# Patient Record
Sex: MALE | Race: WHITE | NOT HISPANIC OR LATINO | Employment: OTHER | URBAN - METROPOLITAN AREA
[De-identification: names, ages, dates, MRNs, and addresses within clinical notes are randomized per-mention and may not be internally consistent; named-entity substitution may affect disease eponyms.]

---

## 2017-08-22 ENCOUNTER — OFFICE VISIT (OUTPATIENT)
Dept: FAMILY MEDICINE | Facility: CLINIC | Age: 82
End: 2017-08-22
Payer: MEDICARE

## 2017-08-22 VITALS
WEIGHT: 154.31 LBS | BODY MASS INDEX: 22.85 KG/M2 | DIASTOLIC BLOOD PRESSURE: 96 MMHG | SYSTOLIC BLOOD PRESSURE: 120 MMHG | RESPIRATION RATE: 18 BRPM | HEART RATE: 72 BPM | HEIGHT: 69 IN

## 2017-08-22 DIAGNOSIS — I10 ESSENTIAL HYPERTENSION: ICD-10-CM

## 2017-08-22 DIAGNOSIS — R94.6 BORDERLINE ABNORMAL THYROID FUNCTION TEST: ICD-10-CM

## 2017-08-22 DIAGNOSIS — G20.A1 PARKINSON DISEASE: ICD-10-CM

## 2017-08-22 DIAGNOSIS — M79.601 RIGHT ARM PAIN: ICD-10-CM

## 2017-08-22 DIAGNOSIS — R29.898 WEAKNESS OF RIGHT HAND: Primary | ICD-10-CM

## 2017-08-22 DIAGNOSIS — Z95.2 S/P AVR (AORTIC VALVE REPLACEMENT): ICD-10-CM

## 2017-08-22 DIAGNOSIS — I48.91 ATRIAL FIBRILLATION, CONTROLLED: ICD-10-CM

## 2017-08-22 PROCEDURE — 99214 OFFICE O/P EST MOD 30 MIN: CPT | Mod: S$PBB | Performed by: FAMILY MEDICINE

## 2017-08-22 PROCEDURE — 99999 PR STA SHADOW: CPT | Mod: PBBFAC,,, | Performed by: FAMILY MEDICINE

## 2017-08-22 PROCEDURE — 99212 OFFICE O/P EST SF 10 MIN: CPT | Mod: PBBFAC | Performed by: FAMILY MEDICINE

## 2017-08-22 PROCEDURE — 99999 PR PBB SHADOW E&M-EST. PATIENT-LVL II: CPT | Mod: PBBFAC,,, | Performed by: FAMILY MEDICINE

## 2017-08-22 RX ORDER — LISINOPRIL 40 MG/1
40 TABLET ORAL DAILY
Status: ON HOLD | COMMUNITY
End: 2019-02-25 | Stop reason: HOSPADM

## 2017-08-22 RX ORDER — FUROSEMIDE 20 MG/1
20 TABLET ORAL DAILY
Status: ON HOLD | COMMUNITY
End: 2023-01-01

## 2017-08-22 RX ORDER — CARVEDILOL 12.5 MG/1
6.25 TABLET ORAL 2 TIMES DAILY WITH MEALS
COMMUNITY
End: 2023-01-01 | Stop reason: DRUGHIGH

## 2017-08-22 NOTE — PROGRESS NOTES
"Subjective:       Patient ID: Vipul Green is a 83 y.o. male.    Chief Complaint: Hand Pain (R hand )    Pt is a 83 y.o. male who presents for evaluation and management of   Encounter Diagnoses   Name Primary?    Weakness of right hand Yes    Right arm pain     Essential hypertension     S/P AVR (aortic valve replacement)     Atrial fibrillation, controlled     Parkinson disease    .I have not seen him in almost 2 years, but he is seeing Dr. Mra regularly   He is here today because of weakness and pain in right hand and forearm. "it feels like the nerve," he says.     Doing well on current meds. Denies any side effects. Prevention is up to date.  Review of Systems   Constitutional: Negative for activity change, appetite change, chills, diaphoresis, fatigue, fever and unexpected weight change.   HENT: Negative for congestion, dental problem, drooling, ear discharge, ear pain, facial swelling, mouth sores, nosebleeds, postnasal drip, rhinorrhea, sinus pressure, sneezing, sore throat, trouble swallowing and voice change.    Eyes: Negative for photophobia, pain, discharge, redness, itching and visual disturbance.   Respiratory: Negative for apnea, cough, chest tightness, shortness of breath and wheezing.    Cardiovascular: Negative for chest pain, palpitations and leg swelling.   Gastrointestinal: Negative for abdominal distention, abdominal pain, blood in stool, constipation, diarrhea, nausea and vomiting.   Genitourinary: Negative for difficulty urinating, dysuria, enuresis, flank pain, frequency, hematuria and urgency.   Musculoskeletal: Positive for gait problem. Negative for arthralgias, back pain, joint swelling, myalgias, neck pain and neck stiffness.        Ambulates with walker, left AFO for foot drop related to old CVA   Skin: Negative for color change, rash and wound.   Neurological: Positive for tremors and weakness. Negative for dizziness, seizures, syncope, facial asymmetry, speech difficulty, " light-headedness, numbness and headaches.   Hematological: Negative for adenopathy. Does not bruise/bleed easily.   Psychiatric/Behavioral: Negative for agitation, behavioral problems, confusion, decreased concentration, dysphoric mood, sleep disturbance and suicidal ideas. The patient is not nervous/anxious.        Objective:      Physical Exam   Constitutional: He is oriented to person, place, and time. He appears well-developed and well-nourished.   HENT:   Head: Normocephalic and atraumatic.   Right Ear: External ear normal.   Left Ear: External ear normal.   Nose: Nose normal.   Mouth/Throat: Oropharynx is clear and moist.   Eyes: Conjunctivae and EOM are normal. Pupils are equal, round, and reactive to light. Right eye exhibits no discharge. Left eye exhibits no discharge. No scleral icterus.   Neck: Normal range of motion. Neck supple. No JVD present. No tracheal deviation present. No thyromegaly present.   Cardiovascular: Normal rate, regular rhythm, normal heart sounds and intact distal pulses.    No murmur heard.  Pulmonary/Chest: Effort normal and breath sounds normal. No respiratory distress. He has no wheezes. He has no rales. He exhibits no tenderness.   Abdominal: Soft. Bowel sounds are normal. He exhibits no distension and no mass. There is no tenderness. There is no rebound and no guarding.   Musculoskeletal: Normal range of motion.   Inconclusive tinel's  Unable to test Phalen's due to old wrist injury      Lymphadenopathy:     He has no cervical adenopathy.   Neurological: He is alert and oriented to person, place, and time. He has normal reflexes. No cranial nerve deficit.   Ambulates with walker   Resting tremor of right hand    Skin: Skin is warm and dry.   Psychiatric: He has a normal mood and affect. His behavior is normal. Judgment and thought content normal.       Assessment:       1. Weakness of right hand    2. Right arm pain    3. Essential hypertension    4. S/P AVR (aortic valve  replacement)    5. Atrial fibrillation, controlled    6. Parkinson disease    7. Borderline abnormal thyroid function test        Plan:   Vipul was seen today for hand pain.    Diagnoses and all orders for this visit:    Weakness of right hand  -     Nerve conduction test; Future    Right arm pain  -     Nerve conduction test; Future    Essential hypertension    S/P AVR (aortic valve replacement)    Atrial fibrillation, controlled    Parkinson disease    Borderline abnormal thyroid function test  -     TSH; Future    continue same see orders     LAtest labs with [facility] reveiwed---3/2017. His Thyroid function was borderline. Will repeat   Decide f/u based on nerve conduction studies, but I will see in 6 months regardless.  Get flu shot in October     No Follow-up on file.

## 2017-08-24 ENCOUNTER — LAB VISIT (OUTPATIENT)
Dept: LAB | Facility: HOSPITAL | Age: 82
End: 2017-08-24
Attending: FAMILY MEDICINE
Payer: MEDICARE

## 2017-08-24 DIAGNOSIS — R94.6 BORDERLINE ABNORMAL THYROID FUNCTION TEST: ICD-10-CM

## 2017-08-24 LAB — TSH SERPL DL<=0.005 MIU/L-ACNC: 2.93 UIU/ML

## 2017-08-24 PROCEDURE — 36415 COLL VENOUS BLD VENIPUNCTURE: CPT

## 2017-08-24 PROCEDURE — 84443 ASSAY THYROID STIM HORMONE: CPT

## 2018-01-05 ENCOUNTER — OUTSIDE PLACE OF SERVICE (OUTPATIENT)
Dept: ADMINISTRATIVE | Facility: OTHER | Age: 83
End: 2018-01-05
Payer: MEDICARE

## 2018-01-05 PROCEDURE — 99305 1ST NF CARE MODERATE MDM 35: CPT | Mod: ,,, | Performed by: FAMILY MEDICINE

## 2018-02-07 ENCOUNTER — OUTSIDE PLACE OF SERVICE (OUTPATIENT)
Dept: ADMINISTRATIVE | Facility: OTHER | Age: 83
End: 2018-02-07
Payer: MEDICARE

## 2018-02-07 PROCEDURE — 99307 SBSQ NF CARE SF MDM 10: CPT | Mod: ,,, | Performed by: FAMILY MEDICINE

## 2018-03-02 ENCOUNTER — OUTSIDE PLACE OF SERVICE (OUTPATIENT)
Dept: ADMINISTRATIVE | Facility: OTHER | Age: 83
End: 2018-03-02
Payer: MEDICARE

## 2018-03-02 PROCEDURE — 99309 SBSQ NF CARE MODERATE MDM 30: CPT | Mod: ,,, | Performed by: FAMILY MEDICINE

## 2018-03-15 ENCOUNTER — OFFICE VISIT (OUTPATIENT)
Dept: CARDIOLOGY | Facility: CLINIC | Age: 83
End: 2018-03-15
Payer: MEDICARE

## 2018-03-15 VITALS
HEIGHT: 68 IN | HEART RATE: 70 BPM | DIASTOLIC BLOOD PRESSURE: 78 MMHG | WEIGHT: 154 LBS | SYSTOLIC BLOOD PRESSURE: 138 MMHG | BODY MASS INDEX: 23.34 KG/M2

## 2018-03-15 DIAGNOSIS — I50.20 SYSTOLIC CONGESTIVE HEART FAILURE, UNSPECIFIED CONGESTIVE HEART FAILURE CHRONICITY: ICD-10-CM

## 2018-03-15 DIAGNOSIS — I10 ESSENTIAL HYPERTENSION: ICD-10-CM

## 2018-03-15 DIAGNOSIS — Z86.79 HISTORY OF ATRIAL FIBRILLATION: ICD-10-CM

## 2018-03-15 DIAGNOSIS — G20.A1 PARKINSON DISEASE: ICD-10-CM

## 2018-03-15 DIAGNOSIS — E78.5 HYPERLIPIDEMIA, UNSPECIFIED HYPERLIPIDEMIA TYPE: ICD-10-CM

## 2018-03-15 DIAGNOSIS — I65.23 BILATERAL CAROTID ARTERY STENOSIS: ICD-10-CM

## 2018-03-15 DIAGNOSIS — Z95.0 PACEMAKER: Primary | ICD-10-CM

## 2018-03-15 DIAGNOSIS — I26.99 OTHER ACUTE PULMONARY EMBOLISM WITHOUT ACUTE COR PULMONALE: ICD-10-CM

## 2018-03-15 DIAGNOSIS — Z95.2 S/P AVR (AORTIC VALVE REPLACEMENT): ICD-10-CM

## 2018-03-15 DIAGNOSIS — I63.9 CEREBRAL INFARCTION, UNSPECIFIED MECHANISM: ICD-10-CM

## 2018-03-15 PROCEDURE — 99204 OFFICE O/P NEW MOD 45 MIN: CPT | Mod: S$GLB,,, | Performed by: INTERNAL MEDICINE

## 2018-03-15 PROCEDURE — 93000 ELECTROCARDIOGRAM COMPLETE: CPT | Mod: S$GLB,,, | Performed by: INTERNAL MEDICINE

## 2018-03-15 PROCEDURE — 3075F SYST BP GE 130 - 139MM HG: CPT | Mod: S$GLB,,, | Performed by: INTERNAL MEDICINE

## 2018-03-15 PROCEDURE — 99999 PR PBB SHADOW E&M-EST. PATIENT-LVL III: CPT | Mod: PBBFAC,,, | Performed by: INTERNAL MEDICINE

## 2018-03-15 PROCEDURE — 3078F DIAST BP <80 MM HG: CPT | Mod: S$GLB,,, | Performed by: INTERNAL MEDICINE

## 2018-03-16 ENCOUNTER — TELEPHONE (OUTPATIENT)
Dept: CARDIOLOGY | Facility: CLINIC | Age: 83
End: 2018-03-16

## 2018-03-16 NOTE — PROGRESS NOTES
Subjective:    Patient ID:  Vipul Green is a 84 y.o. male who presents for evaluation of Valvular Heart Disease      HPI  85 y/o male [facility] resident with hx of PPM (symptomatic michelle?), bioprosthetic AVR, HFpEF, carotid artery stenosis, HTN, HLD, PD who presents to [facility] care. Formerly under the care of Dr Mar. 2DE and carotid artery reports from 2017 available and reviewed. Denies CP, SOB, orthopnea, PND, syncope, palps, LE edema. Has bilateral leg weakness with hx of falls and mostly in wchr. Meds given by NH, does not smoke.     Review of Systems   Constitution: Negative for weakness and malaise/fatigue.   HENT: Negative for congestion.    Eyes: Negative for blurred vision.   Cardiovascular: Negative for chest pain, claudication, cyanosis, dyspnea on exertion, irregular heartbeat, leg swelling, near-syncope, orthopnea, palpitations, paroxysmal nocturnal dyspnea and syncope.   Respiratory: Negative for shortness of breath.    Endocrine: Negative for polyuria.   Hematologic/Lymphatic: Negative for bleeding problem.   Skin: Negative for itching and rash.   Musculoskeletal: Negative for joint swelling, muscle cramps and muscle weakness.   Gastrointestinal: Negative for abdominal pain, hematemesis, hematochezia, melena, nausea and vomiting.   Genitourinary: Negative for dysuria and hematuria.   Neurological: Negative for dizziness, focal weakness, headaches, light-headedness and loss of balance.   Psychiatric/Behavioral: Negative for depression. The patient is not nervous/anxious.         Objective:    Physical Exam   Constitutional: He is oriented to person, place, and time. He appears well-developed and well-nourished.   HENT:   Head: Normocephalic and atraumatic.   Neck: Neck supple. No JVD present.   Cardiovascular: Normal rate and regular rhythm.    Murmur heard.   Systolic murmur is present with a grade of 2/6   Pulses:       Carotid pulses are 2+ on the right side, and 2+ on the left side.       Radial  pulses are 2+ on the right side, and 2+ on the left side.        Femoral pulses are 2+ on the right side, and 2+ on the left side.       Posterior tibial pulses are 1+ on the right side, and 1+ on the left side.   Pulmonary/Chest: Effort normal and breath sounds normal.   Abdominal: Soft. Bowel sounds are normal.   Musculoskeletal: He exhibits no edema.   Neurological: He is alert and oriented to person, place, and time.   Skin: Skin is warm and dry.   Psychiatric: He has a normal mood and affect. His behavior is normal. Thought content normal.         Assessment:       1. Pacemaker    2. S/P AVR (aortic valve replacement)    3. Hyperlipidemia, unspecified hyperlipidemia type    4. History of atrial fibrillation    5. Essential hypertension    6. Systolic congestive heart failure, unspecified congestive heart failure chronicity    7. Bilateral carotid artery stenosis    8. Other acute pulmonary embolism without acute cor pulmonale    9. Cerebral infarction, unspecified mechanism    10. Parkinson disease      83 y/o male with hx and presentation as above. States he had his PPM checked recently and will obtain records, if not, will schedule with pacemaker clinic. Asymptomatic from a cardiac perspective.       Plan:       -Continue current medical management  -f/u in 1 year with 2DE before

## 2018-03-16 NOTE — TELEPHONE ENCOUNTER
----- Message from Ne Pham sent at 3/16/2018 11:42 AM CDT -----  Contact: Ne Krause's information concerning his pacemaker device is scanned into [employer]. It was faxed over from [facility] [place]

## 2018-05-03 ENCOUNTER — OUTSIDE PLACE OF SERVICE (OUTPATIENT)
Dept: ADMINISTRATIVE | Facility: OTHER | Age: 83
End: 2018-05-03
Payer: MEDICARE

## 2018-05-03 PROCEDURE — 99309 SBSQ NF CARE MODERATE MDM 30: CPT | Mod: ,,, | Performed by: FAMILY MEDICINE

## 2018-06-27 ENCOUNTER — OFFICE VISIT (OUTPATIENT)
Dept: CARDIOLOGY | Facility: CLINIC | Age: 83
End: 2018-06-27
Payer: MEDICARE

## 2018-06-27 DIAGNOSIS — Z95.0 PACEMAKER: Primary | ICD-10-CM

## 2018-06-27 PROCEDURE — 93288 INTERROG EVL PM/LDLS PM IP: CPT | Mod: 26,,, | Performed by: INTERNAL MEDICINE

## 2018-06-27 PROCEDURE — 1101F PT FALLS ASSESS-DOCD LE1/YR: CPT | Mod: S$GLB,,, | Performed by: INTERNAL MEDICINE

## 2018-07-06 ENCOUNTER — OUTSIDE PLACE OF SERVICE (OUTPATIENT)
Dept: FAMILY MEDICINE | Facility: CLINIC | Age: 83
End: 2018-07-06
Payer: MEDICARE

## 2018-07-06 PROCEDURE — 99308 SBSQ NF CARE LOW MDM 20: CPT | Mod: ,,, | Performed by: FAMILY MEDICINE

## 2018-09-06 ENCOUNTER — OUTSIDE PLACE OF SERVICE (OUTPATIENT)
Dept: FAMILY MEDICINE | Facility: CLINIC | Age: 83
End: 2018-09-06
Payer: MEDICARE

## 2018-09-06 PROCEDURE — 99308 SBSQ NF CARE LOW MDM 20: CPT | Mod: ,,, | Performed by: FAMILY MEDICINE

## 2018-11-01 ENCOUNTER — OUTSIDE PLACE OF SERVICE (OUTPATIENT)
Dept: FAMILY MEDICINE | Facility: CLINIC | Age: 83
End: 2018-11-01
Payer: MEDICARE

## 2018-11-01 PROCEDURE — 99307 SBSQ NF CARE SF MDM 10: CPT | Mod: ,,, | Performed by: FAMILY MEDICINE

## 2018-11-20 ENCOUNTER — PES CALL (OUTPATIENT)
Dept: ADMINISTRATIVE | Facility: CLINIC | Age: 83
End: 2018-11-20

## 2018-11-29 NOTE — PROGRESS NOTES
Primary MD: Richie Floyd  Primary Cardiologist: Ollie Retana   Implanting MD:    and serial number: [employer]; Preeta [id number], [id number]  Implant date: 5/10/2011     Reason for evaluation:routine  Indication for pacemaker: CHB     Measurements  Atrial sensing - N/A  Ventricular sensing - R wave: N/A CHB  Ventricular capture: RV Maintained: 0.5 V @ 0.4 ms   Ventricular lead impedance: RV: 868 ohms  Underlying rhythm: V paced       Diagnostic Data  Atrial paced: N/A Ventricular paced: 99.7 %  Mode switch: V paced   Sensor response: not available  Battery status: satisfactory Magnet rate: 99.4 bpm   Estimated battery longevity:  3.5 years     Final Parameters  Mode: VVIR Lower rate: 60 bpm Upper rate: 130 bpm  Ventricular - Amplitude: 2.0 V Pulse width: 0.4 ms Sensitivity: 4.0 mV         Changes made: none        Follow up: 3 months

## 2018-12-06 ENCOUNTER — TELEPHONE (OUTPATIENT)
Dept: CARDIOLOGY | Facility: CLINIC | Age: 83
End: 2018-12-06

## 2018-12-06 NOTE — TELEPHONE ENCOUNTER
----- Message from Azalia Lazaro sent at 12/6/2018 11:44 AM CST -----  Contact: [phone]/Rupa pt's daughter   Pt's daughter states she got a letter stating the nurse its due for a pacemaker check . Please advise

## 2018-12-06 NOTE — TELEPHONE ENCOUNTER
Returned pt daughter phone call     Pt daughter was advised about pt device check date and time    [place] letter also mailed out to pt

## 2019-01-14 ENCOUNTER — TELEPHONE (OUTPATIENT)
Dept: CARDIOLOGY | Facility: CLINIC | Age: 84
End: 2019-01-14

## 2019-01-14 NOTE — TELEPHONE ENCOUNTER
----- Message from Shara Olvera sent at 1/14/2019 12:34 PM CST -----  Contact: daughter Hortencia [phone]  Patient's daughter is requesting to speak with you regarding scheduling a pacemaker check. Please advise.

## 2019-01-14 NOTE — TELEPHONE ENCOUNTER
Returned pt daughter phone call    Pt daughter was advised that pt is scheduled for device check in [place]

## 2019-01-30 ENCOUNTER — CLINICAL SUPPORT (OUTPATIENT)
Dept: CARDIOLOGY | Facility: CLINIC | Age: 84
End: 2019-01-30
Payer: MEDICARE

## 2019-01-30 PROCEDURE — 93288 INTERROG EVL PM/LDLS PM IP: CPT | Mod: 26,,, | Performed by: INTERNAL MEDICINE

## 2019-01-30 PROCEDURE — 93288 PR INTERROG EVAL, IN PERSON,PACEMAKER: ICD-10-PCS | Mod: 26,,, | Performed by: INTERNAL MEDICINE

## 2019-02-06 ENCOUNTER — OUTSIDE PLACE OF SERVICE (OUTPATIENT)
Dept: FAMILY MEDICINE | Facility: CLINIC | Age: 84
End: 2019-02-06
Payer: MEDICARE

## 2019-02-06 PROCEDURE — 99307 SBSQ NF CARE SF MDM 10: CPT | Mod: ,,, | Performed by: FAMILY MEDICINE

## 2019-02-06 PROCEDURE — 99307 PR NURSING FAC CARE, SUBSEQ, IMPROVING: ICD-10-PCS | Mod: ,,, | Performed by: FAMILY MEDICINE

## 2019-02-21 ENCOUNTER — OUTSIDE PLACE OF SERVICE (OUTPATIENT)
Dept: FAMILY MEDICINE | Facility: CLINIC | Age: 84
End: 2019-02-21

## 2019-02-21 ENCOUNTER — HOSPITAL ENCOUNTER (INPATIENT)
Facility: HOSPITAL | Age: 84
LOS: 4 days | Discharge: NURSING, REHAB OR LONG-TERM CARE | DRG: 871 | End: 2019-02-25
Attending: EMERGENCY MEDICINE | Admitting: FAMILY MEDICINE
Payer: MEDICARE

## 2019-02-21 ENCOUNTER — OUTSIDE PLACE OF SERVICE (OUTPATIENT)
Dept: FAMILY MEDICINE | Facility: CLINIC | Age: 84
End: 2019-02-21
Payer: MEDICARE

## 2019-02-21 DIAGNOSIS — G20.A1 PARKINSON DISEASE: ICD-10-CM

## 2019-02-21 DIAGNOSIS — A41.9 SEPSIS: ICD-10-CM

## 2019-02-21 DIAGNOSIS — Z45.2 ENCOUNTER FOR CENTRAL LINE PLACEMENT: ICD-10-CM

## 2019-02-21 DIAGNOSIS — R65.21 SEPTIC SHOCK: Primary | ICD-10-CM

## 2019-02-21 DIAGNOSIS — I48.91 ATRIAL FIBRILLATION, CONTROLLED: ICD-10-CM

## 2019-02-21 DIAGNOSIS — I50.9 CONGESTIVE HEART FAILURE, UNSPECIFIED HF CHRONICITY, UNSPECIFIED HEART FAILURE TYPE: ICD-10-CM

## 2019-02-21 DIAGNOSIS — A41.9 SEPTIC SHOCK: Primary | ICD-10-CM

## 2019-02-21 DIAGNOSIS — N30.01 ACUTE CYSTITIS WITH HEMATURIA: ICD-10-CM

## 2019-02-21 PROCEDURE — 99308 PR NURSING FAC CARE, SUBSEQ, MINOR COMPLIC: ICD-10-PCS | Mod: ,,, | Performed by: FAMILY MEDICINE

## 2019-02-21 PROCEDURE — 87086 URINE CULTURE/COLONY COUNT: CPT

## 2019-02-21 PROCEDURE — 96365 THER/PROPH/DIAG IV INF INIT: CPT

## 2019-02-21 PROCEDURE — 99308 SBSQ NF CARE LOW MDM 20: CPT | Mod: ,,, | Performed by: FAMILY MEDICINE

## 2019-02-21 PROCEDURE — 83690 ASSAY OF LIPASE: CPT

## 2019-02-21 PROCEDURE — 87502 INFLUENZA DNA AMP PROBE: CPT

## 2019-02-21 PROCEDURE — 96361 HYDRATE IV INFUSION ADD-ON: CPT

## 2019-02-21 PROCEDURE — 82550 ASSAY OF CK (CPK): CPT

## 2019-02-21 PROCEDURE — 87088 URINE BACTERIA CULTURE: CPT

## 2019-02-21 PROCEDURE — 87077 CULTURE AEROBIC IDENTIFY: CPT

## 2019-02-21 PROCEDURE — 25000003 PHARM REV CODE 250: Performed by: EMERGENCY MEDICINE

## 2019-02-21 PROCEDURE — 96367 TX/PROPH/DG ADDL SEQ IV INF: CPT

## 2019-02-21 PROCEDURE — S0073 INJECTION, AZTREONAM, 500 MG: HCPCS | Performed by: EMERGENCY MEDICINE

## 2019-02-21 PROCEDURE — 12000002 HC ACUTE/MED SURGE SEMI-PRIVATE ROOM

## 2019-02-21 PROCEDURE — 84439 ASSAY OF FREE THYROXINE: CPT

## 2019-02-21 PROCEDURE — 87186 SC STD MICRODIL/AGAR DIL: CPT | Mod: 59

## 2019-02-21 PROCEDURE — 80053 COMPREHEN METABOLIC PANEL: CPT

## 2019-02-21 PROCEDURE — 96366 THER/PROPH/DIAG IV INF ADDON: CPT

## 2019-02-21 PROCEDURE — 81000 URINALYSIS NONAUTO W/SCOPE: CPT

## 2019-02-21 PROCEDURE — P9612 CATHETERIZE FOR URINE SPEC: HCPCS | Mod: 59

## 2019-02-21 PROCEDURE — 83605 ASSAY OF LACTIC ACID: CPT

## 2019-02-21 PROCEDURE — 87040 BLOOD CULTURE FOR BACTERIA: CPT

## 2019-02-21 PROCEDURE — 36556 INSERT NON-TUNNEL CV CATH: CPT | Mod: 59

## 2019-02-21 PROCEDURE — 84443 ASSAY THYROID STIM HORMONE: CPT

## 2019-02-21 PROCEDURE — 99291 CRITICAL CARE FIRST HOUR: CPT | Mod: 25

## 2019-02-21 PROCEDURE — 85025 COMPLETE CBC W/AUTO DIFF WBC: CPT

## 2019-02-21 RX ORDER — METRONIDAZOLE 500 MG/100ML
500 INJECTION, SOLUTION INTRAVENOUS
Status: COMPLETED | OUTPATIENT
Start: 2019-02-21 | End: 2019-02-22

## 2019-02-21 RX ORDER — KETOROLAC TROMETHAMINE 30 MG/ML
15 INJECTION, SOLUTION INTRAMUSCULAR; INTRAVENOUS
Status: DISCONTINUED | OUTPATIENT
Start: 2019-02-21 | End: 2019-02-21

## 2019-02-21 RX ADMIN — AZTREONAM 2000 MG: 2 INJECTION, POWDER, LYOPHILIZED, FOR SOLUTION INTRAMUSCULAR; INTRAVENOUS at 11:02

## 2019-02-21 RX ADMIN — SODIUM CHLORIDE 2067 ML: 0.9 INJECTION, SOLUTION INTRAVENOUS at 10:02

## 2019-02-22 PROBLEM — R65.21 SEPTIC SHOCK: Status: ACTIVE | Noted: 2019-02-22

## 2019-02-22 PROBLEM — A41.9 SEPSIS: Status: RESOLVED | Noted: 2019-02-22 | Resolved: 2019-02-22

## 2019-02-22 PROBLEM — A41.9 SEPTIC SHOCK: Status: ACTIVE | Noted: 2019-02-22

## 2019-02-22 PROBLEM — N39.0 UTI (URINARY TRACT INFECTION): Status: ACTIVE | Noted: 2019-02-22

## 2019-02-22 PROBLEM — A41.9 SEPSIS: Status: ACTIVE | Noted: 2019-02-22

## 2019-02-22 LAB
ALBUMIN SERPL BCP-MCNC: 2.7 G/DL
ALBUMIN SERPL BCP-MCNC: 3.2 G/DL
ALP SERPL-CCNC: 102 U/L
ALP SERPL-CCNC: 202 U/L
ALT SERPL W/O P-5'-P-CCNC: 12 U/L
ALT SERPL W/O P-5'-P-CCNC: 23 U/L
ANION GAP SERPL CALC-SCNC: 12 MMOL/L
ANION GAP SERPL CALC-SCNC: 13 MMOL/L
ANION GAP SERPL CALC-SCNC: 14 MMOL/L
APTT BLDCRRT: 34.1 SEC
AST SERPL-CCNC: 26 U/L
AST SERPL-CCNC: 43 U/L
BACTERIA #/AREA URNS HPF: ABNORMAL /HPF
BASOPHILS # BLD AUTO: 0 K/UL
BASOPHILS # BLD AUTO: ABNORMAL K/UL
BASOPHILS NFR BLD: 0 %
BASOPHILS NFR BLD: 0 %
BILIRUB SERPL-MCNC: 1.6 MG/DL
BILIRUB SERPL-MCNC: 1.8 MG/DL
BILIRUB UR QL STRIP: ABNORMAL
BNP SERPL-MCNC: 224 PG/ML
BUN SERPL-MCNC: 45 MG/DL
BUN SERPL-MCNC: 46 MG/DL
BUN SERPL-MCNC: 53 MG/DL
CALCIUM SERPL-MCNC: 8.1 MG/DL
CALCIUM SERPL-MCNC: 8.4 MG/DL
CALCIUM SERPL-MCNC: 8.9 MG/DL
CHLORIDE SERPL-SCNC: 102 MMOL/L
CHLORIDE SERPL-SCNC: 104 MMOL/L
CHLORIDE SERPL-SCNC: 104 MMOL/L
CK SERPL-CCNC: 63 U/L
CLARITY UR: CLEAR
CO2 SERPL-SCNC: 17 MMOL/L
CO2 SERPL-SCNC: 19 MMOL/L
CO2 SERPL-SCNC: 20 MMOL/L
COLOR UR: ABNORMAL
CREAT SERPL-MCNC: 2.9 MG/DL
CREAT SERPL-MCNC: 3.1 MG/DL
CREAT SERPL-MCNC: 3.2 MG/DL
CREAT UR-MCNC: 101.9 MG/DL
CREAT UR-MCNC: 101.9 MG/DL
DIFFERENTIAL METHOD: ABNORMAL
DIFFERENTIAL METHOD: ABNORMAL
EOSINOPHIL # BLD AUTO: 0 K/UL
EOSINOPHIL # BLD AUTO: ABNORMAL K/UL
EOSINOPHIL NFR BLD: 0 %
EOSINOPHIL NFR BLD: 1 %
ERYTHROCYTE [DISTWIDTH] IN BLOOD BY AUTOMATED COUNT: 13.8 %
ERYTHROCYTE [DISTWIDTH] IN BLOOD BY AUTOMATED COUNT: 14.1 %
EST. GFR  (AFRICAN AMERICAN): 19 ML/MIN/1.73 M^2
EST. GFR  (AFRICAN AMERICAN): 20 ML/MIN/1.73 M^2
EST. GFR  (AFRICAN AMERICAN): 22 ML/MIN/1.73 M^2
EST. GFR  (NON AFRICAN AMERICAN): 17 ML/MIN/1.73 M^2
EST. GFR  (NON AFRICAN AMERICAN): 17 ML/MIN/1.73 M^2
EST. GFR  (NON AFRICAN AMERICAN): 19 ML/MIN/1.73 M^2
GENTAMICIN SERPL-MCNC: 7.4 UG/ML
GIANT PLATELETS BLD QL SMEAR: PRESENT
GLUCOSE SERPL-MCNC: 136 MG/DL
GLUCOSE SERPL-MCNC: 159 MG/DL
GLUCOSE SERPL-MCNC: 85 MG/DL
GLUCOSE UR QL STRIP: ABNORMAL
HCT VFR BLD AUTO: 35.9 %
HCT VFR BLD AUTO: 37.5 %
HGB BLD-MCNC: 11.4 G/DL
HGB BLD-MCNC: 12 G/DL
HGB UR QL STRIP: ABNORMAL
HYALINE CASTS #/AREA URNS LPF: 0 /LPF
INFLUENZA A, MOLECULAR: NEGATIVE
INFLUENZA B, MOLECULAR: NEGATIVE
INR PPP: 1.2
KETONES UR QL STRIP: ABNORMAL
LACTATE SERPL-SCNC: 1.9 MMOL/L
LACTATE SERPL-SCNC: 4.5 MMOL/L
LACTATE SERPL-SCNC: 5.2 MMOL/L
LACTATE SERPL-SCNC: 6 MMOL/L
LEUKOCYTE ESTERASE UR QL STRIP: ABNORMAL
LIPASE SERPL-CCNC: 27 U/L
LYMPHOCYTES # BLD AUTO: 0.2 K/UL
LYMPHOCYTES # BLD AUTO: ABNORMAL K/UL
LYMPHOCYTES NFR BLD: 1 %
LYMPHOCYTES NFR BLD: 1.7 %
MAGNESIUM SERPL-MCNC: 1.5 MG/DL
MCH RBC QN AUTO: 30.4 PG
MCH RBC QN AUTO: 30.5 PG
MCHC RBC AUTO-ENTMCNC: 31.8 G/DL
MCHC RBC AUTO-ENTMCNC: 32 G/DL
MCV RBC AUTO: 95 FL
MCV RBC AUTO: 96 FL
MICROSCOPIC COMMENT: ABNORMAL
MONOCYTES # BLD AUTO: 0 K/UL
MONOCYTES # BLD AUTO: ABNORMAL K/UL
MONOCYTES NFR BLD: 0 %
MONOCYTES NFR BLD: 0.1 %
NEUTROPHILS # BLD AUTO: 12.2 K/UL
NEUTROPHILS NFR BLD: 75 %
NEUTROPHILS NFR BLD: 98.2 %
NEUTS BAND NFR BLD MANUAL: 23 %
NITRITE UR QL STRIP: POSITIVE
PH UR STRIP: 7 [PH] (ref 5–8)
PHOSPHATE SERPL-MCNC: 1.3 MG/DL
PLATELET # BLD AUTO: 181 K/UL
PLATELET # BLD AUTO: 187 K/UL
PLATELET BLD QL SMEAR: ABNORMAL
PMV BLD AUTO: 10.1 FL
PMV BLD AUTO: 10.1 FL
POCT GLUCOSE: 127 MG/DL (ref 70–110)
POCT GLUCOSE: 143 MG/DL (ref 70–110)
POTASSIUM SERPL-SCNC: 3.9 MMOL/L
POTASSIUM SERPL-SCNC: 4.8 MMOL/L
POTASSIUM SERPL-SCNC: 5 MMOL/L
PROCALCITONIN SERPL IA-MCNC: 154.24 NG/ML
PROT SERPL-MCNC: 6.1 G/DL
PROT SERPL-MCNC: 7.1 G/DL
PROT UR QL STRIP: ABNORMAL
PROT UR-MCNC: 138 MG/DL
PROT/CREAT UR: 1.35 MG/G{CREAT}
PROTHROMBIN TIME: 12.9 SEC
RBC # BLD AUTO: 3.75 M/UL
RBC # BLD AUTO: 3.94 M/UL
RBC #/AREA URNS HPF: 80 /HPF (ref 0–4)
SODIUM SERPL-SCNC: 134 MMOL/L
SODIUM SERPL-SCNC: 135 MMOL/L
SODIUM SERPL-SCNC: 136 MMOL/L
SODIUM UR-SCNC: 32 MMOL/L
SP GR UR STRIP: 1.01 (ref 1–1.03)
SPECIMEN SOURCE: NORMAL
SQUAMOUS #/AREA URNS HPF: 0 /HPF
T4 FREE SERPL-MCNC: 0.97 NG/DL
TOXIC GRANULES BLD QL SMEAR: PRESENT
TROPONIN I SERPL DL<=0.01 NG/ML-MCNC: 0.09 NG/ML
TROPONIN I SERPL DL<=0.01 NG/ML-MCNC: 0.22 NG/ML
TSH SERPL DL<=0.005 MIU/L-ACNC: 5.31 UIU/ML
URN SPEC COLLECT METH UR: ABNORMAL
UROBILINOGEN UR STRIP-ACNC: >=8 EU/DL
WBC # BLD AUTO: 12.52 K/UL
WBC # BLD AUTO: 32.47 K/UL
WBC #/AREA URNS HPF: >100 /HPF (ref 0–5)

## 2019-02-22 PROCEDURE — 20000000 HC ICU ROOM

## 2019-02-22 PROCEDURE — 80048 BASIC METABOLIC PNL TOTAL CA: CPT

## 2019-02-22 PROCEDURE — 80053 COMPREHEN METABOLIC PANEL: CPT

## 2019-02-22 PROCEDURE — 84145 PROCALCITONIN (PCT): CPT

## 2019-02-22 PROCEDURE — 27000221 HC OXYGEN, UP TO 24 HOURS

## 2019-02-22 PROCEDURE — 25000003 PHARM REV CODE 250: Performed by: EMERGENCY MEDICINE

## 2019-02-22 PROCEDURE — S0030 INJECTION, METRONIDAZOLE: HCPCS | Performed by: EMERGENCY MEDICINE

## 2019-02-22 PROCEDURE — 63600175 PHARM REV CODE 636 W HCPCS: Performed by: STUDENT IN AN ORGANIZED HEALTH CARE EDUCATION/TRAINING PROGRAM

## 2019-02-22 PROCEDURE — 85730 THROMBOPLASTIN TIME PARTIAL: CPT

## 2019-02-22 PROCEDURE — 84100 ASSAY OF PHOSPHORUS: CPT

## 2019-02-22 PROCEDURE — 80170 ASSAY OF GENTAMICIN: CPT

## 2019-02-22 PROCEDURE — 83735 ASSAY OF MAGNESIUM: CPT

## 2019-02-22 PROCEDURE — 25000003 PHARM REV CODE 250: Performed by: STUDENT IN AN ORGANIZED HEALTH CARE EDUCATION/TRAINING PROGRAM

## 2019-02-22 PROCEDURE — S0073 INJECTION, AZTREONAM, 500 MG: HCPCS | Performed by: STUDENT IN AN ORGANIZED HEALTH CARE EDUCATION/TRAINING PROGRAM

## 2019-02-22 PROCEDURE — 83605 ASSAY OF LACTIC ACID: CPT

## 2019-02-22 PROCEDURE — 85027 COMPLETE CBC AUTOMATED: CPT

## 2019-02-22 PROCEDURE — 85610 PROTHROMBIN TIME: CPT

## 2019-02-22 PROCEDURE — 82570 ASSAY OF URINE CREATININE: CPT

## 2019-02-22 PROCEDURE — 83880 ASSAY OF NATRIURETIC PEPTIDE: CPT

## 2019-02-22 PROCEDURE — 36415 COLL VENOUS BLD VENIPUNCTURE: CPT

## 2019-02-22 PROCEDURE — 83605 ASSAY OF LACTIC ACID: CPT | Mod: 91

## 2019-02-22 PROCEDURE — 94761 N-INVAS EAR/PLS OXIMETRY MLT: CPT

## 2019-02-22 PROCEDURE — 85007 BL SMEAR W/DIFF WBC COUNT: CPT

## 2019-02-22 PROCEDURE — 84484 ASSAY OF TROPONIN QUANT: CPT | Mod: 91

## 2019-02-22 PROCEDURE — 84300 ASSAY OF URINE SODIUM: CPT

## 2019-02-22 PROCEDURE — 84484 ASSAY OF TROPONIN QUANT: CPT

## 2019-02-22 RX ORDER — NOREPINEPHRINE BITARTRATE/D5W 16MG/250ML
0.05 PLASTIC BAG, INJECTION (ML) INTRAVENOUS CONTINUOUS
Status: DISCONTINUED | OUTPATIENT
Start: 2019-02-22 | End: 2019-02-24

## 2019-02-22 RX ORDER — SODIUM CHLORIDE 9 MG/ML
INJECTION, SOLUTION INTRAVENOUS CONTINUOUS
Status: DISCONTINUED | OUTPATIENT
Start: 2019-02-22 | End: 2019-02-25 | Stop reason: HOSPADM

## 2019-02-22 RX ORDER — MAGNESIUM SULFATE HEPTAHYDRATE 40 MG/ML
2 INJECTION, SOLUTION INTRAVENOUS ONCE
Status: COMPLETED | OUTPATIENT
Start: 2019-02-22 | End: 2019-02-22

## 2019-02-22 RX ORDER — VANCOMYCIN HCL IN 5 % DEXTROSE 1G/250ML
15 PLASTIC BAG, INJECTION (ML) INTRAVENOUS
Status: DISCONTINUED | OUTPATIENT
Start: 2019-02-22 | End: 2019-02-22 | Stop reason: DRUGHIGH

## 2019-02-22 RX ORDER — VANCOMYCIN HCL IN 5 % DEXTROSE 1G/250ML
15 PLASTIC BAG, INJECTION (ML) INTRAVENOUS
Status: DISCONTINUED | OUTPATIENT
Start: 2019-02-22 | End: 2019-02-22

## 2019-02-22 RX ORDER — NOREPINEPHRINE BITARTRATE/D5W 16MG/250ML
0.05 PLASTIC BAG, INJECTION (ML) INTRAVENOUS CONTINUOUS
Status: DISCONTINUED | OUTPATIENT
Start: 2019-02-22 | End: 2019-02-22

## 2019-02-22 RX ORDER — DIGOXIN 125 MCG
125 TABLET ORAL DAILY
Status: DISCONTINUED | OUTPATIENT
Start: 2019-02-22 | End: 2019-02-25 | Stop reason: HOSPADM

## 2019-02-22 RX ORDER — CARBIDOPA AND LEVODOPA 25; 100 MG/1; MG/1
1 TABLET ORAL 3 TIMES DAILY
Status: DISCONTINUED | OUTPATIENT
Start: 2019-02-22 | End: 2019-02-25 | Stop reason: HOSPADM

## 2019-02-22 RX ORDER — ACETAMINOPHEN 325 MG/1
650 TABLET ORAL EVERY 6 HOURS PRN
Status: DISCONTINUED | OUTPATIENT
Start: 2019-02-22 | End: 2019-02-25 | Stop reason: HOSPADM

## 2019-02-22 RX ORDER — MIDODRINE HYDROCHLORIDE 5 MG/1
5 TABLET ORAL 2 TIMES DAILY WITH MEALS
Status: DISCONTINUED | OUTPATIENT
Start: 2019-02-22 | End: 2019-02-25

## 2019-02-22 RX ORDER — ASPIRIN 81 MG/1
81 TABLET ORAL DAILY
Status: DISCONTINUED | OUTPATIENT
Start: 2019-02-22 | End: 2019-02-25 | Stop reason: HOSPADM

## 2019-02-22 RX ADMIN — DIGOXIN 125 MCG: 125 TABLET ORAL at 09:02

## 2019-02-22 RX ADMIN — MIDODRINE HYDROCHLORIDE 5 MG: 5 TABLET ORAL at 06:02

## 2019-02-22 RX ADMIN — SODIUM CHLORIDE: 0.9 INJECTION, SOLUTION INTRAVENOUS at 09:02

## 2019-02-22 RX ADMIN — GENTAMICIN SULFATE 206.8 MG: 40 INJECTION, SOLUTION INTRAMUSCULAR; INTRAVENOUS at 03:02

## 2019-02-22 RX ADMIN — Medication 0.3 MCG/KG/MIN: at 08:02

## 2019-02-22 RX ADMIN — APIXABAN 2.5 MG: 2.5 TABLET, FILM COATED ORAL at 08:02

## 2019-02-22 RX ADMIN — METRONIDAZOLE 500 MG: 500 INJECTION, SOLUTION INTRAVENOUS at 12:02

## 2019-02-22 RX ADMIN — ACETAMINOPHEN 650 MG: 325 TABLET ORAL at 06:02

## 2019-02-22 RX ADMIN — MAGNESIUM SULFATE IN WATER 2 G: 40 INJECTION, SOLUTION INTRAVENOUS at 09:02

## 2019-02-22 RX ADMIN — ASPIRIN 81 MG: 81 TABLET, COATED ORAL at 09:02

## 2019-02-22 RX ADMIN — Medication 1 MCG/KG/MIN: at 07:02

## 2019-02-22 RX ADMIN — Medication 0.2 MCG/KG/MIN: at 01:02

## 2019-02-22 RX ADMIN — CARBIDOPA AND LEVODOPA 1 TABLET: 25; 100 TABLET ORAL at 09:02

## 2019-02-22 RX ADMIN — APIXABAN 2.5 MG: 2.5 TABLET, FILM COATED ORAL at 09:02

## 2019-02-22 RX ADMIN — AZTREONAM 1000 MG: 1 INJECTION, POWDER, LYOPHILIZED, FOR SOLUTION INTRAMUSCULAR; INTRAVENOUS at 08:02

## 2019-02-22 RX ADMIN — AZTREONAM 1000 MG: 1 INJECTION, POWDER, LYOPHILIZED, FOR SOLUTION INTRAMUSCULAR; INTRAVENOUS at 01:02

## 2019-02-22 RX ADMIN — CARBIDOPA AND LEVODOPA 1 TABLET: 25; 100 TABLET ORAL at 08:02

## 2019-02-22 NOTE — CHAPLAIN
[facility] urgently requesting Anointing sacrament for patient.  I placed a call to [facility] and Fr Sky responded.  I visited with the family until Fr Sky came and I participated in Anointing with patient and family.  Following that visit I brought patient a blue and a white rosary. (I did not have Red)  Patient lives at the [facility] in [place], and attends [employer], and many  friends there. He was functions pretty well prior to his infection.  Family is hopeful for recovery, and very supportive to patient.

## 2019-02-22 NOTE — ED NOTES
Admit team at bedside with family. Patient repositioned. Comfort and safety measures maintained. Will continue to monitor closely.

## 2019-02-22 NOTE — ASSESSMENT & PLAN NOTE
Per daughter patient has been treated for UTI for the past week   SIRS 2/4 on admission   Febrile 102.4   Tachypnea 29   Lactic acid 6  No leukocytosis   Likely source is urine   UA 3+ leukocytes, positive nitrites and many bacteria   Sepsis protocol initiated   30 mg/kg NS 2,067 total ml   Start Vanc and gentamicin  Blood cultures and urine cultures pending   Influenza negative   Patient hypotensive despite aggressive fluid resuscitation 75/48  Admit to ICU

## 2019-02-22 NOTE — HPI
Vipul Green is a 85 y.o. male with history of Parkinson disease, Pacemaker in place, Afib, HLD, CHF, s/p AVR who presents to the [facility] ED via  EMS for for evaluation of fever and chills. The daughters were also concerned that he might be a little altered.  The patient has been treated for a UTI with an unknown antibiotic for about a week. [facility] the [facility] to continue the abx. The daughter states today the patient called her and stated he did not feel well. He complained of fever, chills, shortness of breath and chest discomfort. The patient resides at the [facility]. The patient received a flu vaccine this year. Denies any travels or sick contacts.

## 2019-02-22 NOTE — ED NOTES
Patient repositioned. Patient gives a thumbs up after asking if now comfortable. Family at bedside. Will continue to monitor closely.

## 2019-02-22 NOTE — ED NOTES
Comfort and safety measures maintained. Family continues to be at bedside. Will continue to monitor closely.

## 2019-02-22 NOTE — ED NOTES
Patient repositioned in bed and placed on left side. Family continues to be at bedside. Patient appears to be resting at this time. Will continue to monitor closely.

## 2019-02-22 NOTE — ED NOTES
MD notified of patients blood pressure. Consent for central line at bedside along with central line cart and ultrasound. Dr Santiago speaking with family.

## 2019-02-22 NOTE — ASSESSMENT & PLAN NOTE
Patient with history of HTN   Hold BP medications in the setting of septic shock and hypotension   Will continue to monitor

## 2019-02-22 NOTE — H&P
[facility] Medicine  History & Physical    Patient Name: Vipul Green  MRN: [id number]  Admission Date: 2/21/2019  Attending Physician: Severyn Yaroshevsky, MD   Primary Care Provider: Anthony D Haase Iii, MD         Patient information was obtained from relative(s) and ER records.     Subjective:     Principal Problem:Septic shock    Chief Complaint:   Chief Complaint   Patient presents with    Altered Mental Status     To ER with c/o AMS and SOB per EMS from [facility] starting today.    Shortness of Breath        HPI: Vipul Green is a 85 y.o. male with history of Parkinson disease, Pacemaker in place, Afib, HLD, CHF, s/p AVR who presents to the [facility] ED via  EMS for for evaluation of fever and chills. The daughters were also concerned that he might be a little altered.  The patient has been treated for a UTI with an unknown antibiotic for about a week. Per the [facility] and was [facility] to continue the abx. The daughter states today the patient called her and stated he did not feel well. He complained of fever, chills, shortness of breath and chest discomfort. The patient resides at the [facility]. The patient received a flu vaccine this year. Denies any travels or sick contacts.     Past Medical History:   Diagnosis Date    Asbestosis     Coronary artery disease     Hyperlipidemia     Stroke        Past Surgical History:   Procedure Laterality Date    CARDIAC CATHETERIZATION      CARDIAC VALVE SURGERY      CORONARY ARTERY BYPASS GRAFT      INSERT / REPLACE / REMOVE PACEMAKER         Review of patient's allergies indicates:   Allergen Reactions    Amiodarone analogues     Keflex [cephalexin]        No current facility-administered medications on file prior to encounter.      Current Outpatient Medications on File Prior to Encounter   Medication Sig    apixaban (ELIQUIS) 2.5 mg Tab Take 1 tablet (2.5 mg  total) by mouth 2 (two) times daily.    aspirin (ECOTRIN) 81 MG EC tablet Take 81 mg by mouth once daily.    Ca-D3-mag#11-zinc-cupr-man-mike (CALCIUM 600+D3 PLUS) 600 mg calcium- 800 unit-50 mg Tab Take by mouth once daily.    carbidopa-levodopa  mg (SINEMET)  mg per tablet Take 1 tablet by mouth 3 (three) times daily.    carvedilol (COREG) 12.5 MG tablet Take 12.5 mg by mouth 2 (two) times daily with meals.    digoxin (LANOXIN) 125 mcg tablet Take 125 mcg by mouth once daily.      docusate sodium (COLACE) 100 MG capsule Take 100 mg by mouth once daily.    furosemide (LASIX) 40 MG tablet Take 40 mg by mouth 2 (two) times daily.    lisinopril (PRINIVIL,ZESTRIL) 40 MG tablet Take 40 mg by mouth once daily.    losartan (COZAAR) 50 MG tablet Take 1 tablet (50 mg total) by mouth once daily.    pantoprazole (PROTONIX) 40 MG tablet Take 40 mg by mouth once daily.    tamsulosin (FLOMAX) 0.4 mg Cp24 Take 0.4 mg by mouth once daily.     Family History     None        Tobacco Use    Smoking status: Never Smoker   Substance and Sexual Activity    Alcohol use: Not on file    Drug use: Not on file    Sexual activity: Not on file     Review of Systems   Constitutional: Positive for activity change, chills, diaphoresis, fatigue and fever.   HENT: Negative for congestion and ear pain.    Eyes: Negative for pain.   Respiratory: Positive for shortness of breath. Negative for apnea, chest tightness and wheezing.    Cardiovascular: Positive for chest pain. Negative for palpitations.   Gastrointestinal: Positive for nausea. Negative for abdominal distention, abdominal pain, constipation, diarrhea and rectal pain.   Endocrine: Negative for polydipsia.   Genitourinary: Negative for flank pain.   Musculoskeletal: Negative for arthralgias, back pain, neck pain and neck stiffness.   Skin: Negative for rash.   Neurological: Negative for dizziness, tremors, facial asymmetry, speech difficulty and light-headedness.    Psychiatric/Behavioral: Negative for agitation.     Objective:     Vital Signs (Most Recent):  Temp: (!) 101.7 °F (38.7 °C) (02/22/19 0127)  Pulse: 60 (02/22/19 0202)  Resp: (!) 29 (02/22/19 0157)  BP: (!) 97/56 (02/22/19 0202)  SpO2: 99 % (02/22/19 0202) Vital Signs (24h Range):  Temp:  [101.7 °F (38.7 °C)-102.4 °F (39.1 °C)] 101.7 °F (38.7 °C)  Pulse:  [60-77] 60  Resp:  [18-42] 29  SpO2:  [81 %-99 %] 99 %  BP: ()/(44-63) 97/56     Weight: 68.9 kg (152 lb)  Body mass index is 22.45 kg/m².    Physical Exam   Constitutional: He is oriented to person, place, and time. He appears well-developed and well-nourished. He is cooperative. He appears ill. He appears distressed. Nasal cannula in place.   HENT:   Head: Normocephalic and atraumatic.   Right Ear: External ear normal.   Left Ear: External ear normal.   Eyes: Conjunctivae and EOM are normal. Pupils are equal, round, and reactive to light.   Neck: Normal range of motion. Neck supple.       Cardiovascular: Normal rate, regular rhythm and intact distal pulses. Exam reveals no gallop and no friction rub.   No murmur heard.  Pulmonary/Chest: No stridor. No respiratory distress. He has decreased breath sounds.       Abdominal: Soft. Bowel sounds are normal. He exhibits no distension. There is no tenderness. There is no guarding.   Musculoskeletal: Normal range of motion. He exhibits no edema.   Neurological: He is alert and oriented to person, place, and time.   Skin: Skin is warm and dry.   Psychiatric: He has a normal mood and affect.   Nursing note and vitals reviewed.        CRANIAL NERVES     CN III, IV, VI   Pupils are equal, round, and reactive to light.  Extraocular motions are normal.        Significant Labs:   CBC:   Recent Labs   Lab 02/21/19  2319   WBC 12.52   HGB 12.0*   HCT 37.5*        CMP:   Recent Labs   Lab 02/21/19  1798      K 5.0      CO2 20*   GLU 85   BUN 45*   CREATININE 2.9*   CALCIUM 8.9   PROT 7.1   ALBUMIN 3.2*    BILITOT 1.8*   ALKPHOS 202*   AST 26   ALT 12   ANIONGAP 14   EGFRNONAA 19*     Cardiac Markers: No results for input(s): CKMB, MYOGLOBIN, BNP, TROPISTAT in the last 48 hours.  Coagulation: No results for input(s): PT, INR, APTT in the last 48 hours.  Lactic Acid:   Recent Labs   Lab 02/21/19  2319   LACTATE 6.0*     POCT Glucose: No results for input(s): POCTGLUCOSE in the last 48 hours.  TSH:   Recent Labs   Lab 02/21/19  2319   TSH 5.313*     Urine Studies:   Recent Labs   Lab 02/21/19  2353   COLORU [place]*   APPEARANCEUA Clear   PHUR 7.0   SPECGRAV 1.010   PROTEINUA 3+*   GLUCUA 1+*   KETONESU 1+*   BILIRUBINUA 2+*   OCCULTUA 3+*   NITRITE Positive*   UROBILINOGEN >=8.0*   LEUKOCYTESUR 3+*   RBCUA 80*   WBCUA >100*   BACTERIA Many*   SQUAMEPITHEL 0   HYALINECASTS 0     All pertinent labs within the past 24 hours have been reviewed.    Significant Imaging:   X-Ray Chest 1 View   Final Result      Interval placement of right internal jugular approach central venous catheter with tip projecting over the distal SVC.  No evidence of pneumothorax..         Electronically signed by: Cl Betts MD   Date:    02/22/2019   Time:    01:41      X-Ray Chest AP Portable   Final Result      No acute intrathoracic abnormality.  Cardiomegaly.  Asbestosis exposure.         Electronically signed by: Amparo Vuong   Date:    02/21/2019   Time:    23:33           I have reviewed and interpreted all pertinent imaging results/findings within the past 24 hours.    Assessment/Plan:     * Septic shock    Per daughter patient has been treated for UTI for the past week   SIRS 2/4 on admission   Febrile 102.4   Tachypnea 29   Lactic acid 6  No leukocytosis   Likely source is urine   UA 3+ leukocytes, positive nitrites and many bacteria   Sepsis protocol initiated   30 mg/kg NS 2,067 total ml   Start Vanc and gentamicin  Blood cultures and urine cultures pending   Influenza negative   Patient hypotensive despite aggressive fluid  resuscitation 75/48  Admit to ICU           Parkinson disease    Resume home Sinemet        Congestive heart failure     Pt with complaint of SOB  CXR not concerning for active exacerbation   No edema seen on exam   BNP pending   Strict I&O   Daily weight   Will hold  Home lasix in the setting of hypotension secondary to septic shock   Resume home digoxin      HTN (hypertension)    Patient with history of HTN   Hold BP medications in the setting of septic shock and hypotension   Will continue to monitor        Atrial fibrillation, controlled    On Eliquis   Resume home Eliquis            VTE Risk Mitigation (From admission, onward)        Ordered     apixaban tablet 2.5 mg  2 times daily      02/22/19 0310             D'Amico C Johnson, MD  Department of Hospital Medicine   [facility]

## 2019-02-22 NOTE — PLAN OF CARE
Principal Problem:Septic shock     Chief Complaint:        Chief Complaint   Patient presents with    Altered Mental Status       To ER with c/o AMS and SOB per EMS from [facility] starting today.    Shortness of Breath       Pt is prison resident at [facility]    TN performed assessment bedside in ICU wit pt and family, Daughters Marlena Rhodes,  Blanka Pablo, Sons: Natanael Green, Blanka Pablo, Sana Green    Pt is WC bound. Plan is to D/C back to [facility] on discharge, pt and family are agreeable, OK to use agency transportation on discharge    Future Appointments   Date Time Provider Department Center   3/28/2019  9:00 AM Ollie Retana MD [facility] CARDIO [place]       Pt denies any needs or concerns at this time. Discharge planning brochure and business card provided. CM numbers written on white board. Pt encouraged to call with any questions or needs. CM will continue to follow patient throughout the transitions of care, and assist with any discharge needs.       02/22/19 1405   Discharge Assessment   Assessment Type Discharge Planning Assessment   Confirmed/corrected address and phone number on facesheet? Yes   Assessment information obtained from? Patient   Expected Length of Stay (days) 3   Communicated expected length of stay with patient/caregiver yes   Prior to hospitilization cognitive status: Alert/Oriented   Prior to hospitalization functional status: Wheelchair Bound   Current cognitive status: Alert/Oriented   Current Functional Status: Wheelchair Bound   Facility Arrived From: [facility]   Lives With facility resident   Able to Return to Prior Arrangements yes   Is patient able to care for self after discharge? No   Who are your caregiver(s) and their phone number(s)? Daughter: Marlena Cotter [phone], Son: Natanael Green [phone]   Patient's perception of discharge disposition prison care facility   Readmission Within the Last 30 Days no previous admission in last  30 days   Patient currently being followed by outpatient case management? No   Patient currently receives any other outside agency services? No   Equipment Currently Used at Home wheelchair   Do you have any problems affording any of your prescribed medications? No   Is the patient taking medications as prescribed? yes   Does the patient have transportation home? Yes   Transportation Anticipated agency   Dialysis Name and Scheduled days N/A   Does the patient receive services at the [facility]? No   Discharge Plan A Return to nursing home   Discharge Plan B Skilled Nursing Facility   DME Needed Upon Discharge  none   Patient/Family in Agreement with Plan yes     Grace Muro RN Transitional Navigator  [phone]

## 2019-02-22 NOTE — ASSESSMENT & PLAN NOTE
Pt with complaint of SOB  CXR not concerning for active exacerbation   No edema seen on exam   BNP pending   Strict I&O   Daily weight   Will hold  Home lasix in the setting of hypotension secondary to septic shock   Resume home digoxin

## 2019-02-22 NOTE — ED NOTES
[facility], CP. Resp 40 and unlabored. Pt has #20 SL to left wrist. Pt placed on CM, Continuous pulse ox and O22L NC.

## 2019-02-22 NOTE — ED NOTES
Boarder assessment completed. Answers were limited. Patient sleeping at this time. Family at bedside answered most questions. Will continue to monitor closely.

## 2019-02-22 NOTE — PROGRESS NOTES
"Vancomycin Dosing and Monitoring Pharmacy Protocol    Vipul Green is a 85 y.o. male    Height: 5' 9" (1.753 m)   Wt Readings from Last 3 Encounters:   02/21/19 68.9 kg (152 lb)   03/15/18 69.9 kg (154 lb)   08/22/17 70 kg (154 lb 5.2 oz)       Temp Readings from Last 3 Encounters:   02/22/19 (!) 101.7 °F (38.7 °C) (Rectal)   11/14/15 97.8 °F (36.6 °C) (Oral)   09/27/15 97.6 °F (36.4 °C) (Oral)      Lab Results   Component Value Date/Time    WBC 12.52 02/21/2019 11:19 PM    WBC 8.24 02/26/2016 09:20 AM    WBC 7.30 11/14/2015 05:14 PM      Lab Results   Component Value Date/Time    CREATININE 2.9 (H) 02/21/2019 11:19 PM    CREATININE 1.1 01/11/2016 09:51 AM    CREATININE 1.3 11/14/2015 05:14 PM      Lab Results   Component Value Date/Time    LACTATE 6.0 (HH) 02/21/2019 11:19 PM       Serum creatinine: 2.9 mg/dL (H) 02/21/19 2319  Estimated creatinine clearance: 18.1 mL/min (A)    Antibiotics (From admission, onward)      Start     Stop Route Frequency Ordered    02/22/19 0315  gentamicin (GARAMYCIN) 206.8 mg in sodium chloride 0.9% 100 mL IVPB      -- IV Once 02/22/19 0201    02/21/19 2315  vancomycin (VANCOCIN) 1,500 mg in dextrose 5 % 250 mL IVPB  (ED Adult Sepsis Treatment)      02/22 1114 IV ED 1 Time 02/21/19 2310          Antifungals (From admission, onward)      None            Microbiology Results (last 7 days)       Procedure Component Value Units Date/Time    Culture, Respiratory [[id number]]     Order Status:  No result Specimen:  Respiratory from Sputum, Expectorated     Sputum gram stain [[id number]]     Order Status:  No result Specimen:  Body Fluid from Mouth     Influenza A & B by Molecular [[id number]] Collected:  02/21/19 2250    Order Status:  Completed Specimen:  Nasopharyngeal Swab Updated:  02/22/19 0042     Influenza A, Molecular Negative     Influenza B, Molecular Negative     Flu A & B Source Nasal swab    Urine culture [[id number]] Collected:  02/21/19 6535    Order Status:  No result " Specimen:  Urine Updated:  19 0007    Blood culture x two cultures. Draw prior to antibiotics. [[id number]] Collected:  19    Order Status:  Sent Specimen:  Blood from Peripheral, Antecubital, Left Updated:  19    Blood culture x two cultures. Draw prior to antibiotics. [[id number]] Collected:  19    Order Status:  Sent Specimen:  Blood from Peripheral, Antecubital, Right Updated:  19            Indication/Target trough:   Urinary Tract Infection (target trough level 10-15 mcg/ml)     Hemodialysis:   N/A    Dosing Weight:   Wt Readings from Last 1 Encounters:   19 68.9 kg (152 lb)       Last Vancomycin dose: 1500 mg   Date/Time given: 19 at 0126          Vancomycin level:  No results for input(s): VANCOMYCIN-TROUGH in the last 72 hours.  No results for input(s): VANCOMYCIN, RANDOM in the last 72 hours.    Per Protocol Initial/Adjustments Dosin. Initial/Adjustment Dose: Vancomycin adjusted from 1000 mg q12h to pulse dosing based on the results of random vancomycin levels.   2. Vancomycin Random Level will be drawn on 19 at 0400 date/time     Pharmacy will continue to follow.    Please contact if you have any further questions. Thank you.    Shashi Verde, PharmD  [phone]

## 2019-02-22 NOTE — SUBJECTIVE & OBJECTIVE
Past Medical History:   Diagnosis Date    Asbestosis     Coronary artery disease     Hyperlipidemia     Stroke        Past Surgical History:   Procedure Laterality Date    CARDIAC CATHETERIZATION      CARDIAC VALVE SURGERY      CORONARY ARTERY BYPASS GRAFT      INSERT / REPLACE / REMOVE PACEMAKER         Review of patient's allergies indicates:   Allergen Reactions    Amiodarone analogues     Keflex [cephalexin]        No current facility-administered medications on file prior to encounter.      Current Outpatient Medications on File Prior to Encounter   Medication Sig    apixaban (ELIQUIS) 2.5 mg Tab Take 1 tablet (2.5 mg total) by mouth 2 (two) times daily.    aspirin (ECOTRIN) 81 MG EC tablet Take 81 mg by mouth once daily.    Ca-D3-mag#11-zinc-cupr-man-bor (CALCIUM 600+D3 PLUS) 600 mg calcium- 800 unit-50 mg Tab Take by mouth once daily.    carbidopa-levodopa  mg (SINEMET)  mg per tablet Take 1 tablet by mouth 3 (three) times daily.    carvedilol (COREG) 12.5 MG tablet Take 12.5 mg by mouth 2 (two) times daily with meals.    digoxin (LANOXIN) 125 mcg tablet Take 125 mcg by mouth once daily.      docusate sodium (COLACE) 100 MG capsule Take 100 mg by mouth once daily.    furosemide (LASIX) 40 MG tablet Take 40 mg by mouth 2 (two) times daily.    lisinopril (PRINIVIL,ZESTRIL) 40 MG tablet Take 40 mg by mouth once daily.    losartan (COZAAR) 50 MG tablet Take 1 tablet (50 mg total) by mouth once daily.    pantoprazole (PROTONIX) 40 MG tablet Take 40 mg by mouth once daily.    tamsulosin (FLOMAX) 0.4 mg Cp24 Take 0.4 mg by mouth once daily.     Family History     None        Tobacco Use    Smoking status: Never Smoker   Substance and Sexual Activity    Alcohol use: Not on file    Drug use: Not on file    Sexual activity: Not on file     Review of Systems   Constitutional: Positive for activity change, chills, diaphoresis, fatigue and fever.   HENT: Negative for congestion and  ear pain.    Eyes: Negative for pain.   Respiratory: Positive for shortness of breath. Negative for apnea, chest tightness and wheezing.    Cardiovascular: Positive for chest pain. Negative for palpitations.   Gastrointestinal: Positive for nausea. Negative for abdominal distention, abdominal pain, constipation, diarrhea and rectal pain.   Endocrine: Negative for polydipsia.   Genitourinary: Negative for flank pain.   Musculoskeletal: Negative for arthralgias, back pain, neck pain and neck stiffness.   Skin: Negative for rash.   Neurological: Negative for dizziness, tremors, facial asymmetry, speech difficulty and light-headedness.   Psychiatric/Behavioral: Negative for agitation.     Objective:     Vital Signs (Most Recent):  Temp: (!) 101.7 °F (38.7 °C) (02/22/19 0127)  Pulse: 60 (02/22/19 0202)  Resp: (!) 29 (02/22/19 0157)  BP: (!) 97/56 (02/22/19 0202)  SpO2: 99 % (02/22/19 0202) Vital Signs (24h Range):  Temp:  [101.7 °F (38.7 °C)-102.4 °F (39.1 °C)] 101.7 °F (38.7 °C)  Pulse:  [60-77] 60  Resp:  [18-42] 29  SpO2:  [81 %-99 %] 99 %  BP: ()/(44-63) 97/56     Weight: 68.9 kg (152 lb)  Body mass index is 22.45 kg/m².    Physical Exam   Constitutional: He is oriented to person, place, and time. He appears well-developed and well-nourished. He is cooperative. He appears ill. He appears distressed. Nasal cannula in place.   HENT:   Head: Normocephalic and atraumatic.   Right Ear: External ear normal.   Left Ear: External ear normal.   Eyes: Conjunctivae and EOM are normal. Pupils are equal, round, and reactive to light.   Neck: Normal range of motion. Neck supple.       Cardiovascular: Normal rate, regular rhythm and intact distal pulses. Exam reveals no gallop and no friction rub.   No murmur heard.  Pulmonary/Chest: No stridor. No respiratory distress. He has decreased breath sounds.       Abdominal: Soft. Bowel sounds are normal. He exhibits no distension. There is no tenderness. There is no guarding.    Musculoskeletal: Normal range of motion. He exhibits no edema.   Neurological: He is alert and oriented to person, place, and time.   Skin: Skin is warm and dry.   Psychiatric: He has a normal mood and affect.   Nursing note and vitals reviewed.        CRANIAL NERVES     CN III, IV, VI   Pupils are equal, round, and reactive to light.  Extraocular motions are normal.        Significant Labs:   CBC:   Recent Labs   Lab 02/21/19  2319   WBC 12.52   HGB 12.0*   HCT 37.5*        CMP:   Recent Labs   Lab 02/21/19 2319      K 5.0      CO2 20*   GLU 85   BUN 45*   CREATININE 2.9*   CALCIUM 8.9   PROT 7.1   ALBUMIN 3.2*   BILITOT 1.8*   ALKPHOS 202*   AST 26   ALT 12   ANIONGAP 14   EGFRNONAA 19*     Cardiac Markers: No results for input(s): CKMB, MYOGLOBIN, BNP, TROPISTAT in the last 48 hours.  Coagulation: No results for input(s): PT, INR, APTT in the last 48 hours.  Lactic Acid:   Recent Labs   Lab 02/21/19 2319   LACTATE 6.0*     POCT Glucose: No results for input(s): POCTGLUCOSE in the last 48 hours.  TSH:   Recent Labs   Lab 02/21/19 2319   TSH 5.313*     Urine Studies:   Recent Labs   Lab 02/21/19  2353   COLORU [place]*   APPEARANCEUA Clear   PHUR 7.0   SPECGRAV 1.010   PROTEINUA 3+*   GLUCUA 1+*   KETONESU 1+*   BILIRUBINUA 2+*   OCCULTUA 3+*   NITRITE Positive*   UROBILINOGEN >=8.0*   LEUKOCYTESUR 3+*   RBCUA 80*   WBCUA >100*   BACTERIA Many*   SQUAMEPITHEL 0   HYALINECASTS 0     All pertinent labs within the past 24 hours have been reviewed.    Significant Imaging:   X-Ray Chest 1 View   Final Result      Interval placement of right internal jugular approach central venous catheter with tip projecting over the distal SVC.  No evidence of pneumothorax..         Electronically signed by: Cl Betts MD   Date:    02/22/2019   Time:    01:41      X-Ray Chest AP Portable   Final Result      No acute intrathoracic abnormality.  Cardiomegaly.  Asbestosis exposure.         Electronically  signed by: Amparo Vuong   Date:    02/21/2019   Time:    23:33           I have reviewed and interpreted all pertinent imaging results/findings within the past 24 hours.

## 2019-02-22 NOTE — PROGRESS NOTES
"Vancomycin Dosing and Monitoring Pharmacy Protocol    Vipul Green is a 85 y.o. male    Height: 5' 9" (1.753 m)   Wt Readings from Last 3 Encounters:   02/22/19 68.1 kg (150 lb 2.1 oz)   03/15/18 69.9 kg (154 lb)   08/22/17 70 kg (154 lb 5.2 oz)       Temp Readings from Last 3 Encounters:   02/22/19 97.7 °F (36.5 °C) (Oral)   11/14/15 97.8 °F (36.6 °C) (Oral)   09/27/15 97.6 °F (36.4 °C) (Oral)      Lab Results   Component Value Date/Time    WBC 32.47 (H) 02/22/2019 03:10 AM    WBC 12.52 02/21/2019 11:19 PM    WBC 8.24 02/26/2016 09:20 AM      Lab Results   Component Value Date/Time    CREATININE 3.1 (H) 02/22/2019 03:10 AM    CREATININE 2.9 (H) 02/21/2019 11:19 PM    CREATININE 1.1 01/11/2016 09:51 AM      Lab Results   Component Value Date/Time    LACTATE 4.5 (HH) 02/22/2019 07:41 AM    LACTATE 5.2 (HH) 02/22/2019 03:10 AM    LACTATE 6.0 (HH) 02/21/2019 11:19 PM       Serum creatinine: 3.1 mg/dL (H) 02/22/19 0310  Estimated creatinine clearance: 16.8 mL/min (A)    Antibiotics (From admission, onward)      Start     Stop Route Frequency Ordered    02/22/19 1115  piperacillin-tazobactam 4.5 g in dextrose 5 % 100 mL IVPB (ready to mix system)      -- IV Every 12 hours (non-standard times) 02/22/19 1003    02/22/19 1115  vancomycin 750 mg in dextrose 5 % 250 mL IVPB (ready to mix system)  (Vancomycin IVPB with levels panel)      -- IV Every 48 hours (non-standard times) 02/22/19 1015          Antifungals (From admission, onward)      None            Microbiology Results (last 7 days)       Procedure Component Value Units Date/Time    Blood culture x two cultures. Draw prior to antibiotics. [[id number]] Collected:  02/21/19 1992    Order Status:  Sent Specimen:  Blood from Peripheral, Antecubital, Right Updated:  02/22/19 0259    Blood culture x two cultures. Draw prior to antibiotics. [[id number]] Collected:  02/21/19 6057    Order Status:  Sent Specimen:  Blood from Peripheral, Antecubital, Left Updated:  02/22/19 " 0259    Culture, Respiratory [[id number]]     Order Status:  No result Specimen:  Respiratory from Sputum, Expectorated     Sputum gram stain [[id number]]     Order Status:  No result Specimen:  Body Fluid from Mouth     Influenza A & B by Molecular [[id number]] Collected:  19 2250    Order Status:  Completed Specimen:  Nasopharyngeal Swab Updated:  19 0042     Influenza A, Molecular Negative     Influenza B, Molecular Negative     Flu A & B Source Nasal swab    Urine culture [[id number]] Collected:  19 2353    Order Status:  No result Specimen:  Urine Updated:  19 0007            Indication/Target trough:   Sepsis (Target trough: 15-20mcg/ml)    Hemodialysis:   unknown    Dosing Weight:   Wt Readings from Last 1 Encounters:   19 68.1 kg (150 lb 2.1 oz)       Last Vancomycin dose: 1500 mg   Date/Time given: 19 at 0200          Vancomycin level:  No results for input(s): VANCOMYCIN-TROUGH in the last 72 hours.  No results for input(s): VANCOMYCIN, RANDOM in the last 72 hours.    Per Protocol Initial/Adjustments Dosin. Initial/Adjustment Dose: Loading dose = 1500 mg x1, followed by Maintenance dose of 75 mg q48hr to be given at 19 at 0200 date/time  2. Vancomycin Random Level will be drawn on 19 at 0400 date/time     Pharmacy will continue to follow.    Please contact if you have any further questions. Thank you.    Sharri Jordan, PharmD  [phone]

## 2019-02-22 NOTE — ED PROVIDER NOTES
Encounter Date: 2/21/2019    SCRIBE #1 NOTE: I, Svetlana Yanez, am scribing for, and in the presence of,  Dr. Santiago. I have scribed the entire note.       History     Chief Complaint   Patient presents with    Altered Mental Status     To ER with c/o AMS and SOB per EMS from [facility] starting today.    Shortness of Breath     Time seen by provider: 10:49 PM    This is a 85 y.o. male who presents to the ED via EMS from a nursing home due to AMS. According to the nursing home the patient was retching, SOB, and chest pain. Pt reports coughing x 1 week, nausea, vomiting, and abdominal pain.      The history is provided by the EMS personnel, the nursing home and the patient. The history is limited by the condition of the patient.     Review of patient's allergies indicates:   Allergen Reactions    Amiodarone analogues     Keflex [cephalexin]      Past Medical History:   Diagnosis Date    Asbestosis     Coronary artery disease     Hyperlipidemia     Stroke      Past Surgical History:   Procedure Laterality Date    CARDIAC CATHETERIZATION      CARDIAC VALVE SURGERY      CORONARY ARTERY BYPASS GRAFT      INSERT / REPLACE / REMOVE PACEMAKER       No family history on file.  Social History     Tobacco Use    Smoking status: Never Smoker   Substance Use Topics    Alcohol use: Not on file    Drug use: Not on file     Review of Systems   Constitutional: Negative for chills and fever.   HENT: Negative for facial swelling and trouble swallowing.    Eyes: Negative for redness.   Respiratory: Negative for shortness of breath.    Cardiovascular: Negative for chest pain.   Gastrointestinal: Positive for abdominal pain, nausea and vomiting. Negative for diarrhea.   Genitourinary: Negative for dysuria and hematuria.   Musculoskeletal: Negative for gait problem.   Skin: Negative for rash.   Neurological: Negative for facial asymmetry and speech difficulty.       Physical Exam     Initial Vitals   BP Pulse Resp  Temp SpO2   02/21/19 2234 02/21/19 2234 02/21/19 2234 02/21/19 2236 02/21/19 2234   (!) 81/52 77 19 (!) 102.4 °F (39.1 °C) 96 %      MAP       --                Physical Exam    Nursing note and vitals reviewed.  Constitutional: He appears well-developed and well-nourished. He is not diaphoretic. No distress.   HENT:   Head: Normocephalic and atraumatic.   Mouth/Throat: Oropharynx is clear and moist.   Eyes: Conjunctivae and EOM are normal.   Neck: Normal range of motion. Neck supple.   Cardiovascular: Normal rate, regular rhythm and normal heart sounds.   Pulmonary/Chest: No respiratory distress.   Diminished coarse breath sounds bilaterally   Abdominal: Soft. There is no tenderness.   Musculoskeletal: Normal range of motion. He exhibits no edema or tenderness.   Neurological: He is alert and oriented to person, place, and time. He has normal strength.   Skin: Skin is warm and dry.         ED Course   Critical Care  Date/Time: 2/22/2019 1:05 AM  Performed by: Jr Santiago MD  Authorized by: Jr Santiago MD   Direct patient critical care time: 5 minutes  Additional history critical care time: 5 minutes  Ordering / reviewing critical care time: 5 minutes  Documentation critical care time: 5 minutes  Consulting other physicians critical care time: 5 minutes  Consult with family critical care time: 5 minutes  Other critical care time: 10 minutes  Total critical care time (exclusive of procedural time) : 40 minutes  Critical care time was exclusive of teaching time and separately billable procedures and treating other patients.  Critical care was necessary to treat or prevent imminent or life-threatening deterioration of the following conditions: sepsis and shock.  Critical care was time spent personally by me on the following activities: discussions with consultants, development of treatment plan with patient or surrogate, interpretation of cardiac output measurements, evaluation of patient's response to  "treatment, examination of patient, obtaining history from patient or surrogate, ordering and performing treatments and interventions, ordering and review of laboratory studies, pulse oximetry, ordering and review of radiographic studies, re-evaluation of patient's condition, review of old charts and vascular access procedures.  Subsequent provider of critical care: I assumed direction of critical care for this patient from another provider of my specialty.    Central Line  Date/Time: 2/22/2019 1:07 AM  Performed by: Jr Santiago MD  Consent Done: Yes  Time out: Immediately prior to procedure a "time out" was called to verify the correct patient, procedure, equipment, support staff and site/side marked as required.  Indications: med administration  Anesthesia: local infiltration    Anesthesia:  Local Anesthetic: lidocaine 1% with epinephrine  Anesthetic total: 3 mL  Preparation: skin prepped with ChloraPrep  Skin prep agent dried: skin prep agent completely dried prior to procedure  Sterile barriers: all five maximum sterile barriers used - cap, mask, sterile gown, sterile gloves, and large sterile sheet  Hand hygiene: hand hygiene performed prior to central venous catheter insertion  Location details: right internal jugular  Catheter type: triple lumen  Ultrasound guidance: yes  Vessel Caliber: medium, patent, compressibility normal  Vascular Doppler: not done  Needle advanced into vessel with real time Ultrasound guidance.  Guidewire confirmed in vessel.  Sterile sheath used.  Number of attempts: 1  Assessment: placement verified by x-ray  Complications: none  Post-procedure: line sutured,  chlorhexidine patch,  sterile dressing applied and blood return through all ports  Complications: No        Labs Reviewed   CBC W/ AUTO DIFFERENTIAL - Abnormal; Notable for the following components:       Result Value    RBC 3.94 (*)     Hemoglobin 12.0 (*)     Hematocrit 37.5 (*)     Gran # (ANC) 12.2 (*)     Lymph # 0.2 (*) "     Mono # 0.0 (*)     Gran% 98.2 (*)     Lymph% 1.7 (*)     Mono% 0.1 (*)     All other components within normal limits   COMPREHENSIVE METABOLIC PANEL - Abnormal; Notable for the following components:    CO2 20 (*)     BUN, Bld 45 (*)     Creatinine 2.9 (*)     Albumin 3.2 (*)     Total Bilirubin 1.8 (*)     Alkaline Phosphatase 202 (*)     eGFR if  22 (*)     eGFR if non  19 (*)     All other components within normal limits   LACTIC ACID, PLASMA - Abnormal; Notable for the following components:    Lactate (Lactic Acid) 6.0 (*)     All other components within normal limits    Narrative:     LA critical result(s) called and verbal readback obtained from   Jennifer Grover RN, 02/22/2019 00:13   URINALYSIS, REFLEX TO URINE CULTURE - Abnormal; Notable for the following components:    Color, UA Orange (*)     Protein, UA 3+ (*)     Glucose, UA 1+ (*)     Ketones, UA 1+ (*)     Bilirubin (UA) 2+ (*)     Occult Blood UA 3+ (*)     Nitrite, UA Positive (*)     Urobilinogen, UA >=8.0 (*)     Leukocytes, UA 3+ (*)     All other components within normal limits    Narrative:     Preferred Collection Type->Urine, Clean Catch   URINALYSIS MICROSCOPIC - Abnormal; Notable for the following components:    RBC, UA 80 (*)     WBC, UA >100 (*)     Bacteria, UA Many (*)     All other components within normal limits    Narrative:     Preferred Collection Type->Urine, Clean Catch   TSH - Abnormal; Notable for the following components:    TSH 5.313 (*)     All other components within normal limits   INFLUENZA A & B BY MOLECULAR   CULTURE, BLOOD   CULTURE, BLOOD   CULTURE, URINE   CULTURE, RESPIRATORY   GRAM STAIN   LIPASE   LIPASE   TSH   CK   CK   T4, FREE   LACTIC ACID, PLASMA   COMPREHENSIVE METABOLIC PANEL   LACTIC ACID, PLASMA   MAGNESIUM   PHOSPHORUS   TROPONIN I   CBC W/ AUTO DIFFERENTIAL   PROTIME-INR   APTT     EKG Readings: (Independently Interpreted)   Heart Rate: 71.   Ventricular paced  rhythm  No Sgarbossa criteria         X-Rays:   Independently Interpreted Readings:   Other Readings:  Reviewed by myself, read by radiology.     Imaging Results          X-Ray Chest 1 View (Final result)  Result time 02/22/19 01:41:40    Final result by Cl Betts MD (02/22/19 01:41:40)                 Impression:      Interval placement of right internal jugular approach central venous catheter with tip projecting over the distal SVC.  No evidence of pneumothorax..      Electronically signed by: Cl Betts MD  Date:    02/22/2019  Time:    01:41             Narrative:    EXAMINATION:  XR CHEST 1 VIEW    CLINICAL HISTORY:  Encounter for adjustment and management of vascular access device    TECHNIQUE:  Single frontal view of the chest was performed.    COMPARISON:  Chest radiograph 02/21/2019, 11/14/2015    FINDINGS:  There has been interval placement of a right internal jugular approach central venous catheter with tip projecting over the distal SVC.  There is a left chest wall cardiac pacing device with transvenous pacing leads.  There is postoperative change of prior median sternotomy.  Cardiomediastinal silhouette is unchanged.  No new confluent airspace consolidation compared to prior exam or pneumothorax.  Bilateral calcified pleural plaques again noted.                               X-Ray Chest AP Portable (Final result)  Result time 02/21/19 23:33:00    Final result by Amparo Vuong MD (02/21/19 23:33:00)                 Impression:      No acute intrathoracic abnormality.  Cardiomegaly.  Asbestosis exposure.      Electronically signed by: Amparo Vuong  Date:    02/21/2019  Time:    23:33             Narrative:    EXAMINATION:  XR CHEST AP PORTABLE    CLINICAL HISTORY:  Sepsis;    TECHNIQUE:  Single frontal view of the chest was performed.    COMPARISON:  None    FINDINGS:  Median sternotomy changes are present.  There is a left subclavian pacer.  The cardiac silhouette is enlarged.   There are calcified pleural plaques, which may suggest asbestosis exposure.  There is no pleural effusion or pneumothorax detected.  There is no focal consolidation.  An aortic stent is present.  The hilum appears to be stable.                                Medical Decision Making:   ED Management:  Pt in septic shock on arrival likely 2/2 to UTI. Given 30 mg/kg IVF and abx but pt remained hypotensive. Started on NE gtt. Admitted to ICU.                    ED Course as of Feb 22 0229   Thu Feb 21, 2019   2356 83 y/o male [facility] resident with hx of PPM (symptomatic michelle?), bioprosthetic AVR, HFpEF, carotid artery stenosis, HTN, HLD, PD who pw AMS and SOB.   [BD]   Fri Feb 22, 2019   0000 Patient hypotensive the 80s over 50s  [BD]      ED Course User Index  [BD] Jr Santiago MD     Clinical Impression:     1. Septic shock    2. Sepsis    3. Encounter for central line placement            Disposition:   Disposition: Admitted  Condition: Fair       Scribe attestation    Attending Attestation:     Physician Attestation for Scribe:    I, Dr. Jr Santiago, personally performed the services described in this documentation.   All medical record entries made by the scribe were at my direction and in my presence.   I have reviewed the chart and agree that the record is accurate and complete.   Jr Santiago MD  8:01 PM 02/22/2019                      Jr Santiago MD  02/22/19 2012

## 2019-02-22 NOTE — ED NOTES
Patient incontinent of stool. Perianal care done and placed patient in clean diaper. Repositioned patient.

## 2019-02-22 NOTE — PLAN OF CARE
Patient is resident of [facility].  sent info to this facility via [facility].     02/22/19 3421   Post-Acute Status   Post-Acute Authorization Placement   Post-Acute Placement Status Referrals Sent

## 2019-02-22 NOTE — PROGRESS NOTES
"PGY-3 Progress Note [facility]    Follow up for:   Chief Complaint   Patient presents with    Altered Mental Status     To ER with c/o AMS and SOB per EMS from [facility] starting today.    Shortness of Breath       Hospital Stay Day 0    Subjective:  Patient currently in ICU on levophed.      Scheduled Meds:   apixaban  2.5 mg Oral BID    aspirin  81 mg Oral Daily    carbidopa-levodopa  mg  1 tablet Oral TID    digoxin  125 mcg Oral Daily    magnesium sulfate IVPB  2 g Intravenous Once     Continuous Infusions:   sodium chloride 0.9% 50 mL/hr at 19 0912    norepinephrine bitartrate-D5W 1 mcg/kg/min (19 07)     PRN Meds:    Review of patient's allergies indicates:   Allergen Reactions    Amiodarone analogues     Keflex [cephalexin]        Objectives:     Vital Signs (Most Recent)  Temp: 97.7 °F (36.5 °C) (19 0748)  Pulse: 60 (19 0800)  Resp: (!) 48 (19 08)  BP: (!) 100/57 (19 0800)  SpO2: 100 % (19 08)    Vitals(Most Recent)      BP  Min: 68/45  Max: 121/59  Temp  Av.2 °F (37.9 °C)  Min: 97.7 °F (36.5 °C)  Max: 102.4 °F (39.1 °C)  Pulse  Av.4  Min: 60  Max: 77  Resp  Av.3  Min: 16  Max: 60  SpO2  Av.2 %  Min: 81 %  Max: 100 %  Height  Av' 9" (175.3 cm)  Min: 5' 9" (175.3 cm)  Max: 5' 9" (175.3 cm)  Weight  Av.5 kg (151 lb 1.1 oz)  Min: 68.1 kg (150 lb 2.1 oz)  Max: 68.9 kg (152 lb)             Vitals([id number])  Temp:  [97.7 °F (36.5 °C)-102.4 °F (39.1 °C)]   Pulse:  [60-77]   Resp:  [16-60]   BP: ()/(44-63)   SpO2:  [81 %-100 %]     I & O([id number])    Intake/Output Summary (Last 24 hours) at 2019 0958  Last data filed at 2019 0918  Gross per 24 hour   Intake 100 ml   Output 75 ml   Net 25 ml     Urinalysis  Recent Labs   Lab 19  2353   COLORU Ward*   SPECGRAV 1.010   PHUR 7.0   PROTEINUA 3+*   BACTERIA Many*   NITRITE Positive*   LEUKOCYTESUR 3+*   UROBILINOGEN >=8.0*   HYALINECASTS 0 "       Constitutional: He is oriented to person, place, and time. He appears well-developed and well-nourished. He is cooperative. He appears ill. He appears distressed. Nasal cannula in place.   HENT:   Head: Normocephalic and atraumatic.   Right Ear: External ear normal.   Left Ear: External ear normal.   Eyes: Conjunctivae and EOM are normal. Pupils are equal, round, and reactive to light.   Neck: Normal range of motion. Neck supple.       Cardiovascular: Normal rate, regular rhythm and intact distal pulses. Exam reveals no gallop and no friction rub.   No murmur heard.  Pulmonary/Chest: No stridor. No respiratory distress. He has decreased breath sounds.       Abdominal: Soft. Bowel sounds are normal. He exhibits no distension. There is no tenderness. There is no guarding.   Musculoskeletal: Normal range of motion. He exhibits no edema.   Neurological: He is alert and oriented to person, place, and time.   Skin: Skin is warm and dry.   Psychiatric: He has a normal mood and affect.   Nursing note and vitals reviewed.           CRANIAL NERVES      CN III, IV, VI   Pupils are equal, round, and reactive to light.  Extraocular motions are normal.            LABS  CBC  Recent Labs   Lab 02/21/19 2319 02/22/19 0310   WBC 12.52 32.47*   RBC 3.94* 3.75*   HGB 12.0* 11.4*   HCT 37.5* 35.9*    181   MCV 95 96   MCH 30.5 30.4   MCHC 32.0 31.8*     BMP  Recent Labs   Lab 02/21/19 2319 02/22/19 0310    134*   K 5.0 3.9   CO2 20* 17*    104   BUN 45* 46*   CREATININE 2.9* 3.1*   GLU 85 159*       POCT-Glucose  POCT Glucose   Date Value Ref Range Status   02/22/2019 127 (H) 70 - 110 mg/dL Final       Recent Labs   Lab 02/21/19 2319 02/22/19 0310   CALCIUM 8.9 8.1*   MG  --  1.5*   PHOS  --  1.3*     LFT  Recent Labs   Lab 02/21/19 2319 02/22/19 0310   PROT 7.1 6.1   ALBUMIN 3.2* 2.7*   BILITOT 1.8* 1.6*   AST 26 43*   ALKPHOS 202* 102   ALT 12 23         COAGS  Recent Labs   Lab 02/22/19  0310   INR  1.2   APTT 34.1*     CE  Recent Labs   Lab 02/22/19  0310   TROPONINI 0.086*     ABGs  No results for input(s): PH, PCO2, PO2, HCO3, POCSATURATED, BE in the last 24 hours.  BNP  Recent Labs   Lab 02/22/19  0310   *     UA  Recent Labs   Lab 02/21/19  2353   COLORU [place]*   SPECGRAV 1.010   PHUR 7.0   PROTEINUA 3+*   BACTERIA Many*     LAST HbA1c  No results found for: HGBA1C        Imaging  Imaging Results          X-Ray Chest 1 View (Final result)  Result time 02/22/19 01:41:40    Final result by Cl Betts MD (02/22/19 01:41:40)                 Impression:      Interval placement of right internal jugular approach central venous catheter with tip projecting over the distal SVC.  No evidence of pneumothorax..      Electronically signed by: Cl Betts MD  Date:    02/22/2019  Time:    01:41             Narrative:    EXAMINATION:  XR CHEST 1 VIEW    CLINICAL HISTORY:  Encounter for adjustment and management of vascular access device    TECHNIQUE:  Single frontal view of the chest was performed.    COMPARISON:  Chest radiograph 02/21/2019, 11/14/2015    FINDINGS:  There has been interval placement of a right internal jugular approach central venous catheter with tip projecting over the distal SVC.  There is a left chest wall cardiac pacing device with transvenous pacing leads.  There is postoperative change of prior median sternotomy.  Cardiomediastinal silhouette is unchanged.  No new confluent airspace consolidation compared to prior exam or pneumothorax.  Bilateral calcified pleural plaques again noted.                               X-Ray Chest AP Portable (Final result)  Result time 02/21/19 23:33:00    Final result by Amparo Vuong MD (02/21/19 23:33:00)                 Impression:      No acute intrathoracic abnormality.  Cardiomegaly.  Asbestosis exposure.      Electronically signed by: Amparo Vuong  Date:    02/21/2019  Time:    23:33             Narrative:    EXAMINATION:  XR  CHEST AP PORTABLE    CLINICAL HISTORY:  Sepsis;    TECHNIQUE:  Single frontal view of the chest was performed.    COMPARISON:  None    FINDINGS:  Median sternotomy changes are present.  There is a left subclavian pacer.  The cardiac silhouette is enlarged.  There are calcified pleural plaques, which may suggest asbestosis exposure.  There is no pleural effusion or pneumothorax detected.  There is no focal consolidation.  An aortic stent is present.  The hilum appears to be stable.                                Micro:  Microbiology Results (last 7 days)     Procedure Component Value Units Date/Time    Blood culture x two cultures. Draw prior to antibiotics. [[id number]] Collected:  02/21/19 2319    Order Status:  Sent Specimen:  Blood from Peripheral, Antecubital, Right Updated:  02/22/19 0259    Blood culture x two cultures. Draw prior to antibiotics. [[id number]] Collected:  02/21/19 2320    Order Status:  Sent Specimen:  Blood from Peripheral, Antecubital, Left Updated:  02/22/19 0259    Culture, Respiratory [[id number]]     Order Status:  No result Specimen:  Respiratory from Sputum, Expectorated     Sputum gram stain [[id number]]     Order Status:  No result Specimen:  Body Fluid from Mouth     Influenza A & B by Molecular [[id number]] Collected:  02/21/19 2250    Order Status:  Completed Specimen:  Nasopharyngeal Swab Updated:  02/22/19 0042     Influenza A, Molecular Negative     Influenza B, Molecular Negative     Flu A & B Source Nasal swab    Urine culture [[id number]] Collected:  02/21/19 2353    Order Status:  No result Specimen:  Urine Updated:  02/22/19 0007           Assessment/Plan:   Patient is a 85 y.o. male who has a past medical history of Asbestosis, Coronary artery disease, Hyperlipidemia, and Stroke. presents today with   Active Hospital Problems    Diagnosis  POA    *Septic shock [A41.9, R65.21]  Yes    UTI (urinary tract infection) [N39.0]  Unknown    Parkinson disease [G20]  Yes    Congestive  heart failure [I50.9]  Yes    HTN (hypertension) [I10]  Yes    Atrial fibrillation, controlled [I48.91]  Yes      Resolved Hospital Problems   No resolved problems to display.       Septic shock  Per daughter patient has been treated for UTI for the past week   SIRS 2/4 on admission   Febrile 102.4   Tachypnea 29   Lactic acid 6  No leukocytosis   Likely source is urine   UA 3+ leukocytes, positive nitrites and many bacteria   Sepsis protocol initiated   30 mg/kg NS 2,067 total ml   Start Vanc and gentamicin  Blood cultures and urine cultures pending   Influenza negative   Patient hypotensive despite aggressive fluid resuscitation 75/48  Admit to ICU     Parkinson disease  Resume home Sinemet       Congestive heart failure  Pt with complaint of SOB  CXR not concerning for active exacerbation   No edema seen on exam   BNP pending   Strict I&O   Daily weight   Will hold  Home lasix in the setting of hypotension secondary to septic shock   Resume home digoxin      HTN (hypertension)  Patient with history of HTN   Hold BP medications in the setting of septic shock and hypotension   Will continue to monitor         Atrial fibrillation, controlled  On Eliquis   Resume home Eliquis     Current Diet Order   Procedures    Diet NPO Except for: Medication     Order Specific Question:   Except for     Answer:   Medication     VTE Risk Mitigation (From admission, onward)        Ordered     apixaban tablet 2.5 mg  2 times daily      02/22/19 0310          PT/OT:    Code: DNR  Dispo: awaiting clinical improvement    Case discussed with staff    Shashi Lei Jr., MD  [facility] HO-3  02/22/2019 8:27 AM

## 2019-02-23 LAB
ALBUMIN SERPL BCP-MCNC: 2.7 G/DL
ALP SERPL-CCNC: 94 U/L
ALT SERPL W/O P-5'-P-CCNC: 8 U/L
ANION GAP SERPL CALC-SCNC: 9 MMOL/L
ANISOCYTOSIS BLD QL SMEAR: SLIGHT
AST SERPL-CCNC: 40 U/L
BASOPHILS # BLD AUTO: ABNORMAL K/UL
BASOPHILS NFR BLD: 0 %
BILIRUB SERPL-MCNC: 0.9 MG/DL
BUN SERPL-MCNC: 51 MG/DL
CALCIUM SERPL-MCNC: 8.3 MG/DL
CHLORIDE SERPL-SCNC: 107 MMOL/L
CO2 SERPL-SCNC: 21 MMOL/L
CREAT SERPL-MCNC: 2.4 MG/DL
DIFFERENTIAL METHOD: ABNORMAL
EOSINOPHIL # BLD AUTO: ABNORMAL K/UL
EOSINOPHIL NFR BLD: 1 %
ERYTHROCYTE [DISTWIDTH] IN BLOOD BY AUTOMATED COUNT: 14.3 %
EST. GFR  (AFRICAN AMERICAN): 27 ML/MIN/1.73 M^2
EST. GFR  (NON AFRICAN AMERICAN): 24 ML/MIN/1.73 M^2
GLUCOSE SERPL-MCNC: 107 MG/DL
HCT VFR BLD AUTO: 34 %
HGB BLD-MCNC: 10.8 G/DL
HYPOCHROMIA BLD QL SMEAR: ABNORMAL
LYMPHOCYTES # BLD AUTO: ABNORMAL K/UL
LYMPHOCYTES NFR BLD: 9 %
MAGNESIUM SERPL-MCNC: 2.4 MG/DL
MCH RBC QN AUTO: 29.9 PG
MCHC RBC AUTO-ENTMCNC: 31.8 G/DL
MCV RBC AUTO: 94 FL
MONOCYTES # BLD AUTO: ABNORMAL K/UL
MONOCYTES NFR BLD: 3 %
NEUTROPHILS NFR BLD: 83 %
NEUTS BAND NFR BLD MANUAL: 4 %
PHOSPHATE SERPL-MCNC: 2.3 MG/DL
PLATELET # BLD AUTO: 150 K/UL
PLATELET BLD QL SMEAR: ABNORMAL
PMV BLD AUTO: 10 FL
POTASSIUM SERPL-SCNC: 4.2 MMOL/L
PROT SERPL-MCNC: 6.5 G/DL
RBC # BLD AUTO: 3.61 M/UL
SODIUM SERPL-SCNC: 137 MMOL/L
TROPONIN I SERPL DL<=0.01 NG/ML-MCNC: 0.12 NG/ML
VANCOMYCIN SERPL-MCNC: 13.8 UG/ML
WBC # BLD AUTO: 22.57 K/UL

## 2019-02-23 PROCEDURE — 85027 COMPLETE CBC AUTOMATED: CPT

## 2019-02-23 PROCEDURE — 20000000 HC ICU ROOM

## 2019-02-23 PROCEDURE — S0073 INJECTION, AZTREONAM, 500 MG: HCPCS | Performed by: STUDENT IN AN ORGANIZED HEALTH CARE EDUCATION/TRAINING PROGRAM

## 2019-02-23 PROCEDURE — 27000221 HC OXYGEN, UP TO 24 HOURS

## 2019-02-23 PROCEDURE — 80053 COMPREHEN METABOLIC PANEL: CPT

## 2019-02-23 PROCEDURE — 84484 ASSAY OF TROPONIN QUANT: CPT

## 2019-02-23 PROCEDURE — 63600175 PHARM REV CODE 636 W HCPCS: Performed by: FAMILY MEDICINE

## 2019-02-23 PROCEDURE — 85007 BL SMEAR W/DIFF WBC COUNT: CPT

## 2019-02-23 PROCEDURE — 83735 ASSAY OF MAGNESIUM: CPT

## 2019-02-23 PROCEDURE — 25000003 PHARM REV CODE 250: Performed by: STUDENT IN AN ORGANIZED HEALTH CARE EDUCATION/TRAINING PROGRAM

## 2019-02-23 PROCEDURE — 94761 N-INVAS EAR/PLS OXIMETRY MLT: CPT

## 2019-02-23 PROCEDURE — 80202 ASSAY OF VANCOMYCIN: CPT

## 2019-02-23 PROCEDURE — 84100 ASSAY OF PHOSPHORUS: CPT

## 2019-02-23 RX ADMIN — CARBIDOPA AND LEVODOPA 1 TABLET: 25; 100 TABLET ORAL at 09:02

## 2019-02-23 RX ADMIN — AZTREONAM 1000 MG: 1 INJECTION, POWDER, LYOPHILIZED, FOR SOLUTION INTRAMUSCULAR; INTRAVENOUS at 09:02

## 2019-02-23 RX ADMIN — APIXABAN 2.5 MG: 2.5 TABLET, FILM COATED ORAL at 09:02

## 2019-02-23 RX ADMIN — VANCOMYCIN HYDROCHLORIDE 750 MG: 750 INJECTION, POWDER, LYOPHILIZED, FOR SOLUTION INTRAVENOUS at 09:02

## 2019-02-23 RX ADMIN — MIDODRINE HYDROCHLORIDE 5 MG: 5 TABLET ORAL at 08:02

## 2019-02-23 RX ADMIN — DIGOXIN 125 MCG: 125 TABLET ORAL at 09:02

## 2019-02-23 RX ADMIN — MIDODRINE HYDROCHLORIDE 5 MG: 5 TABLET ORAL at 04:02

## 2019-02-23 RX ADMIN — CARBIDOPA AND LEVODOPA 1 TABLET: 25; 100 TABLET ORAL at 03:02

## 2019-02-23 RX ADMIN — ASPIRIN 81 MG: 81 TABLET, COATED ORAL at 09:02

## 2019-02-23 RX ADMIN — AZTREONAM 1000 MG: 1 INJECTION, POWDER, LYOPHILIZED, FOR SOLUTION INTRAMUSCULAR; INTRAVENOUS at 04:02

## 2019-02-23 RX ADMIN — AZTREONAM 1000 MG: 1 INJECTION, POWDER, LYOPHILIZED, FOR SOLUTION INTRAMUSCULAR; INTRAVENOUS at 12:02

## 2019-02-23 NOTE — PROGRESS NOTES
[facility] Hospital Medicine  Progress Note    Patient Name: Vipul Green  MRN: [id number]  Patient Class: IP- Inpatient   Admission Date: 2/21/2019  Length of Stay: 1 days  Attending Physician: Riccardo Wells MD  Primary Care Provider: Anthony D Haase Iii, MD      Subjective:     Principal Problem:Septic shock    HPI:  Vipul Green is a 85 y.o. male with history of Parkinson disease, Pacemaker in place, Afib, HLD, CHF, s/p AVR who presents to the [facility] ED via  EMS for for evaluation of fever and chills. The daughters were also concerned that he might be a little altered.  The patient has been treated for a UTI with an unknown antibiotic for about a week. Per the [facility] to continue the abx. The daughter states today the patient called her and stated he did not feel well. He complained of fever, chills, shortness of breath and chest discomfort. The patient resides at the [facility]. The patient received a flu vaccine this year. Denies any travels or sick contacts.     Hospital Course:  2/23: Patient seen and examined this am. Reports feeling much better than when he was admitted. BP stabilized overnight and weaning off of levophed. Will likel de-escalate vanc and remain on aztreonam as blood and urine growing out gram - rods.    Review of Systems   Constitutional: Positive for activity change, chills, diaphoresis, fatigue and fever.   HENT: Negative for congestion and ear pain.    Eyes: Negative for pain.   Respiratory: Positive for shortness of breath. Negative for apnea, chest tightness and wheezing.    Cardiovascular: Positive for chest pain. Negative for palpitations.   Gastrointestinal: Positive for nausea. Negative for abdominal distention, abdominal pain, constipation, diarrhea and rectal pain.   Endocrine: Negative for polydipsia.   Genitourinary: Negative for flank pain.   Musculoskeletal: Negative for  arthralgias, back pain, neck pain and neck stiffness.   Skin: Negative for rash.   Neurological: Negative for dizziness, tremors, facial asymmetry, speech difficulty and light-headedness.   Psychiatric/Behavioral: Negative for agitation.     Objective:     Vital Signs (Most Recent):  Temp: 97.8 °F (36.6 °C) (02/23/19 0315)  Pulse: 60 (02/23/19 0630)  Resp: (!) 52 (02/23/19 0630)  BP: (!) 108/59 (02/23/19 0630)  SpO2: 100 % (02/23/19 0630) Vital Signs (24h Range):  Temp:  [97.7 °F (36.5 °C)-98.8 °F (37.1 °C)] 97.8 °F (36.6 °C)  Pulse:  [] 60  Resp:  [27-89] 52  SpO2:  [99 %-100 %] 100 %  BP: ()/(44-73) 108/59     Weight: 68.1 kg (150 lb 2.1 oz)  Body mass index is 22.17 kg/m².    Physical Exam   Constitutional: He is oriented to person, place, and time. He appears well-developed and well-nourished. He is cooperative. He does not appear ill. No distress. Nasal cannula in place.   HENT:   Head: Normocephalic and atraumatic.   Right Ear: External ear normal.   Left Ear: External ear normal.   Eyes: Conjunctivae are normal.   Neck: Normal range of motion. Neck supple.       Cardiovascular: Normal rate, regular rhythm and intact distal pulses. Exam reveals no gallop and no friction rub.   No murmur heard.  Pulmonary/Chest: No stridor. No respiratory distress. He has decreased breath sounds.       Abdominal: Soft. Bowel sounds are normal. He exhibits no distension. There is no tenderness. There is no guarding.   Musculoskeletal: Normal range of motion. He exhibits no edema.   Neurological: He is alert and oriented to person, place, and time.   Skin: Skin is warm and dry.   Psychiatric: He has a normal mood and affect.   Nursing note and vitals reviewed.          Significant Labs:   CBC:   Recent Labs   Lab 02/21/19  2319 02/22/19 0310   WBC 12.52 32.47*   HGB 12.0* 11.4*   HCT 37.5* 35.9*    181     CMP:   Recent Labs   Lab 02/21/19  2319 02/22/19  0310 02/22/19  1040 02/23/19  0453    134* 135*  137   K 5.0 3.9 4.8 4.2    104 104 107   CO2 20* 17* 19* 21*   GLU 85 159* 136* 107   BUN 45* 46* 53* 51*   CREATININE 2.9* 3.1* 3.2* 2.4*   CALCIUM 8.9 8.1* 8.4* 8.3*   PROT 7.1 6.1  --  6.5   ALBUMIN 3.2* 2.7*  --  2.7*   BILITOT 1.8* 1.6*  --  0.9   ALKPHOS 202* 102  --  94   AST 26 43*  --  40   ALT 12 23  --  8*   ANIONGAP 14 13 12 9   EGFRNONAA 19* 17* 17* 24*     Cardiac Markers:   Recent Labs   Lab 02/22/19  0310   *     Coagulation:   Recent Labs   Lab 02/22/19  0310   INR 1.2   APTT 34.1*     Lactic Acid:   Recent Labs   Lab 02/22/19  0310 02/22/19  0741 02/22/19  1930   LACTATE 5.2* 4.5* 1.9     POCT Glucose:   Recent Labs   Lab 02/22/19  0712 02/22/19  1604   POCTGLUCOSE 127* 143*     TSH:   Recent Labs   Lab 02/21/19  2319   TSH 5.313*     Urine Studies:   Recent Labs   Lab 02/21/19  2353   COLORU [place]*   APPEARANCEUA Clear   PHUR 7.0   SPECGRAV 1.010   PROTEINUA 3+*   GLUCUA 1+*   KETONESU 1+*   BILIRUBINUA 2+*   OCCULTUA 3+*   NITRITE Positive*   UROBILINOGEN >=8.0*   LEUKOCYTESUR 3+*   RBCUA 80*   WBCUA >100*   BACTERIA Many*   SQUAMEPITHEL 0   HYALINECASTS 0     All pertinent labs within the past 24 hours have been reviewed.    Significant Imaging:   US Retroperitoneal Complete (Kidney and   Final Result      Findings suggesting medical renal disease, possibly acute on chronic on the right.      Although the urinary bladder is decompressed around a Pina catheter, there is wall thickening, correlation with urinalysis as warranted.         Electronically signed by: Ata Wallace MD   Date:    02/22/2019   Time:    13:43      X-Ray Chest 1 View   Final Result      Interval placement of right internal jugular approach central venous catheter with tip projecting over the distal SVC.  No evidence of pneumothorax..         Electronically signed by: Cl Betts MD   Date:    02/22/2019   Time:    01:41      X-Ray Chest AP Portable   Final Result      No acute intrathoracic abnormality.   Cardiomegaly.  Asbestosis exposure.         Electronically signed by: Amparo Vuong   Date:    02/21/2019   Time:    23:33         I have reviewed and interpreted all pertinent imaging results/findings within the past 24 hours.    Assessment/Plan:      * Septic shock    Per daughter patient has been treated for UTI for the past week   SIRS 2/4 on admission   Febrile 102.4   Tachypnea 29   Initially hypotensive and responded well to fluids  Lactic acid 6 on admission- has now normalized  No leukocytosis   Source is urine  UA 3+ leukocytes, positive nitrites and many bacteria   Sepsis protocol initiated in ED  30 mg/kg NS 2,067 total ml   Blood cultures and urine cultures with gram - rods  Started on vanc and aztreonam  Influenza negative   Started on levophed- has been slowly weaning off             UTI (urinary tract infection)    Growing gram - rods  Will continue aztreonam       Parkinson disease    Resume home Sinemet        Congestive heart failure    CXR not concerning for active exacerbation   No edema seen on exam   BNP mildy elevated at 224  Strict I&O   Daily weights  Will hold home lasix in the setting of hypotension secondary to septic shock   Continue home digoxin      HTN (hypertension)    Patient with history of HTN   Hold BP medications in the setting of septic shock and hypotension        Atrial fibrillation, controlled    On home Eliquis           VTE Risk Mitigation (From admission, onward)        Ordered     apixaban tablet 2.5 mg  2 times daily      02/22/19 0310          Critical care time spent on the evaluation and treatment of severe organ dysfunction, review of pertinent labs and imaging studies, discussions with consulting providers and discussions with patient/family: 30 minutes.    Jed Grigsby MD  Department of Hospital Medicine   [facility]

## 2019-02-23 NOTE — ASSESSMENT & PLAN NOTE
Patient with history of HTN   Hold BP medications in the setting of septic shock and hypotension

## 2019-02-23 NOTE — NURSING
Daughter called to check on her father, she was updated about the situation: pt oscillating between alertness and episode of drowsiness, VSS ,still on levophed, good urinary output, noticed having some trouble swallowing ( pt taking a long time swallowing anything put in his mouth)

## 2019-02-23 NOTE — PLAN OF CARE
Problem: Adult Inpatient Plan of Care  Goal: Plan of Care Review  Outcome: Ongoing (interventions implemented as appropriate)   02/23/19 0562   Plan of Care Review   Plan of Care Reviewed With patient   Progress improving   Pt Alert during most of the shift,with some episodes of drowsiness at the beginning of the shift, he is on 2 L NC, his VSS on levophed which is being titrated down. Pt denies any pain or discomfort, no fever. He has spencer ( good urinary output, though urine color is orange),  no BM during the shift. Pt IVs intact ( TLC IJ CDI). Hygiene was provided, all safety precautions in place.

## 2019-02-23 NOTE — ASSESSMENT & PLAN NOTE
Per daughter patient has been treated for UTI for the past week   SIRS 2/4 on admission   Febrile 102.4   Tachypnea 29   Initially hypotensive and responded well to fluids  Lactic acid 6 on admission- has now normalized  No leukocytosis   Source is urine  UA 3+ leukocytes, positive nitrites and many bacteria   Sepsis protocol initiated in ED  30 mg/kg NS 2,067 total ml   Blood cultures and urine cultures with gram - rods  Started on vanc and aztreonam  Influenza negative   Started on levophed- has been slowly weaning off

## 2019-02-23 NOTE — SUBJECTIVE & OBJECTIVE
Review of Systems   Constitutional: Positive for activity change, chills, diaphoresis, fatigue and fever.   HENT: Negative for congestion and ear pain.    Eyes: Negative for pain.   Respiratory: Positive for shortness of breath. Negative for apnea, chest tightness and wheezing.    Cardiovascular: Positive for chest pain. Negative for palpitations.   Gastrointestinal: Positive for nausea. Negative for abdominal distention, abdominal pain, constipation, diarrhea and rectal pain.   Endocrine: Negative for polydipsia.   Genitourinary: Negative for flank pain.   Musculoskeletal: Negative for arthralgias, back pain, neck pain and neck stiffness.   Skin: Negative for rash.   Neurological: Negative for dizziness, tremors, facial asymmetry, speech difficulty and light-headedness.   Psychiatric/Behavioral: Negative for agitation.     Objective:     Vital Signs (Most Recent):  Temp: 97.8 °F (36.6 °C) (02/23/19 0315)  Pulse: 60 (02/23/19 0630)  Resp: (!) 52 (02/23/19 0630)  BP: (!) 108/59 (02/23/19 0630)  SpO2: 100 % (02/23/19 0630) Vital Signs (24h Range):  Temp:  [97.7 °F (36.5 °C)-98.8 °F (37.1 °C)] 97.8 °F (36.6 °C)  Pulse:  [] 60  Resp:  [27-89] 52  SpO2:  [99 %-100 %] 100 %  BP: ()/(44-73) 108/59     Weight: 68.1 kg (150 lb 2.1 oz)  Body mass index is 22.17 kg/m².    Physical Exam   Constitutional: He is oriented to person, place, and time. He appears well-developed and well-nourished. He is cooperative. He does not appear ill. No distress. Nasal cannula in place.   HENT:   Head: Normocephalic and atraumatic.   Right Ear: External ear normal.   Left Ear: External ear normal.   Eyes: Conjunctivae are normal.   Neck: Normal range of motion. Neck supple.       Cardiovascular: Normal rate, regular rhythm and intact distal pulses. Exam reveals no gallop and no friction rub.   No murmur heard.  Pulmonary/Chest: No stridor. No respiratory distress. He has decreased breath sounds.       Abdominal: Soft. Bowel sounds  are normal. He exhibits no distension. There is no tenderness. There is no guarding.   Musculoskeletal: Normal range of motion. He exhibits no edema.   Neurological: He is alert and oriented to person, place, and time.   Skin: Skin is warm and dry.   Psychiatric: He has a normal mood and affect.   Nursing note and vitals reviewed.          Significant Labs:   CBC:   Recent Labs   Lab 02/21/19 2319 02/22/19  0310   WBC 12.52 32.47*   HGB 12.0* 11.4*   HCT 37.5* 35.9*    181     CMP:   Recent Labs   Lab 02/21/19  2319 02/22/19  0310 02/22/19  1040 02/23/19  0453    134* 135* 137   K 5.0 3.9 4.8 4.2    104 104 107   CO2 20* 17* 19* 21*   GLU 85 159* 136* 107   BUN 45* 46* 53* 51*   CREATININE 2.9* 3.1* 3.2* 2.4*   CALCIUM 8.9 8.1* 8.4* 8.3*   PROT 7.1 6.1  --  6.5   ALBUMIN 3.2* 2.7*  --  2.7*   BILITOT 1.8* 1.6*  --  0.9   ALKPHOS 202* 102  --  94   AST 26 43*  --  40   ALT 12 23  --  8*   ANIONGAP 14 13 12 9   EGFRNONAA 19* 17* 17* 24*     Cardiac Markers:   Recent Labs   Lab 02/22/19  0310   *     Coagulation:   Recent Labs   Lab 02/22/19  0310   INR 1.2   APTT 34.1*     Lactic Acid:   Recent Labs   Lab 02/22/19  0310 02/22/19  0741 02/22/19  1930   LACTATE 5.2* 4.5* 1.9     POCT Glucose:   Recent Labs   Lab 02/22/19  0712 02/22/19  1604   POCTGLUCOSE 127* 143*     TSH:   Recent Labs   Lab 02/21/19 2319   TSH 5.313*     Urine Studies:   Recent Labs   Lab 02/21/19  2353   COLORU [place]*   APPEARANCEUA Clear   PHUR 7.0   SPECGRAV 1.010   PROTEINUA 3+*   GLUCUA 1+*   KETONESU 1+*   BILIRUBINUA 2+*   OCCULTUA 3+*   NITRITE Positive*   UROBILINOGEN >=8.0*   LEUKOCYTESUR 3+*   RBCUA 80*   WBCUA >100*   BACTERIA Many*   SQUAMEPITHEL 0   HYALINECASTS 0     All pertinent labs within the past 24 hours have been reviewed.    Significant Imaging:   US Retroperitoneal Complete (Kidney and   Final Result      Findings suggesting medical renal disease, possibly acute on chronic on the right.       Although the urinary bladder is decompressed around a Pina catheter, there is wall thickening, correlation with urinalysis as warranted.         Electronically signed by: Ata Wallace MD   Date:    02/22/2019   Time:    13:43      X-Ray Chest 1 View   Final Result      Interval placement of right internal jugular approach central venous catheter with tip projecting over the distal SVC.  No evidence of pneumothorax..         Electronically signed by: Cl Betts MD   Date:    02/22/2019   Time:    01:41      X-Ray Chest AP Portable   Final Result      No acute intrathoracic abnormality.  Cardiomegaly.  Asbestosis exposure.         Electronically signed by: Amparo Vuong   Date:    02/21/2019   Time:    23:33         I have reviewed and interpreted all pertinent imaging results/findings within the past 24 hours.

## 2019-02-23 NOTE — ASSESSMENT & PLAN NOTE
CXR not concerning for active exacerbation   No edema seen on exam   BNP mildy elevated at 224  Strict I&O   Daily weights  Will hold home lasix in the setting of hypotension secondary to septic shock   Continue home digoxin

## 2019-02-24 LAB
ALBUMIN SERPL BCP-MCNC: 2.5 G/DL
ALLENS TEST: ABNORMAL
ALP SERPL-CCNC: 98 U/L
ALT SERPL W/O P-5'-P-CCNC: 6 U/L
ANION GAP SERPL CALC-SCNC: 4 MMOL/L
AST SERPL-CCNC: 31 U/L
BACTERIA BLD CULT: NORMAL
BACTERIA UR CULT: NORMAL
BASOPHILS # BLD AUTO: 0.03 K/UL
BASOPHILS NFR BLD: 0.2 %
BILIRUB SERPL-MCNC: 0.7 MG/DL
BUN SERPL-MCNC: 47 MG/DL
CALCIUM SERPL-MCNC: 8.2 MG/DL
CHLORIDE SERPL-SCNC: 106 MMOL/L
CO2 SERPL-SCNC: 24 MMOL/L
CREAT SERPL-MCNC: 1.6 MG/DL
DELSYS: ABNORMAL
DIFFERENTIAL METHOD: ABNORMAL
EOSINOPHIL # BLD AUTO: 0.1 K/UL
EOSINOPHIL NFR BLD: 0.9 %
ERYTHROCYTE [DISTWIDTH] IN BLOOD BY AUTOMATED COUNT: 14.2 %
EST. GFR  (AFRICAN AMERICAN): 45 ML/MIN/1.73 M^2
EST. GFR  (NON AFRICAN AMERICAN): 39 ML/MIN/1.73 M^2
GLUCOSE SERPL-MCNC: 95 MG/DL
HCO3 UR-SCNC: 19.5 MMOL/L (ref 24–28)
HCT VFR BLD AUTO: 33.5 %
HGB BLD-MCNC: 10.6 G/DL
LYMPHOCYTES # BLD AUTO: 2.2 K/UL
LYMPHOCYTES NFR BLD: 14.7 %
MAGNESIUM SERPL-MCNC: 2.3 MG/DL
MCH RBC QN AUTO: 29.6 PG
MCHC RBC AUTO-ENTMCNC: 31.6 G/DL
MCV RBC AUTO: 94 FL
MONOCYTES # BLD AUTO: 1.1 K/UL
MONOCYTES NFR BLD: 7.1 %
NEUTROPHILS # BLD AUTO: 11.6 K/UL
NEUTROPHILS NFR BLD: 76.6 %
PCO2 BLDA: 29.3 MMHG (ref 35–45)
PH SMN: 7.43 [PH] (ref 7.35–7.45)
PHOSPHATE SERPL-MCNC: 2.3 MG/DL
PLATELET # BLD AUTO: 144 K/UL
PMV BLD AUTO: 10.6 FL
PO2 BLDA: 72 MMHG (ref 80–100)
POC BE: -5 MMOL/L
POC SATURATED O2: 95 % (ref 95–100)
POC TCO2: 20 MMOL/L (ref 23–27)
POTASSIUM SERPL-SCNC: 4.3 MMOL/L
PROT SERPL-MCNC: 6.3 G/DL
RBC # BLD AUTO: 3.58 M/UL
SAMPLE: ABNORMAL
SITE: ABNORMAL
SODIUM SERPL-SCNC: 134 MMOL/L
WBC # BLD AUTO: 15.13 K/UL

## 2019-02-24 PROCEDURE — 97535 SELF CARE MNGMENT TRAINING: CPT

## 2019-02-24 PROCEDURE — 85025 COMPLETE CBC W/AUTO DIFF WBC: CPT

## 2019-02-24 PROCEDURE — 25000003 PHARM REV CODE 250: Performed by: STUDENT IN AN ORGANIZED HEALTH CARE EDUCATION/TRAINING PROGRAM

## 2019-02-24 PROCEDURE — S0073 INJECTION, AZTREONAM, 500 MG: HCPCS | Performed by: STUDENT IN AN ORGANIZED HEALTH CARE EDUCATION/TRAINING PROGRAM

## 2019-02-24 PROCEDURE — 36600 WITHDRAWAL OF ARTERIAL BLOOD: CPT

## 2019-02-24 PROCEDURE — 83735 ASSAY OF MAGNESIUM: CPT

## 2019-02-24 PROCEDURE — 97166 OT EVAL MOD COMPLEX 45 MIN: CPT

## 2019-02-24 PROCEDURE — 99900035 HC TECH TIME PER 15 MIN (STAT)

## 2019-02-24 PROCEDURE — 97162 PT EVAL MOD COMPLEX 30 MIN: CPT

## 2019-02-24 PROCEDURE — 82803 BLOOD GASES ANY COMBINATION: CPT

## 2019-02-24 PROCEDURE — 11000001 HC ACUTE MED/SURG PRIVATE ROOM

## 2019-02-24 PROCEDURE — 94761 N-INVAS EAR/PLS OXIMETRY MLT: CPT

## 2019-02-24 PROCEDURE — 84100 ASSAY OF PHOSPHORUS: CPT

## 2019-02-24 PROCEDURE — 80053 COMPREHEN METABOLIC PANEL: CPT

## 2019-02-24 RX ADMIN — CARBIDOPA AND LEVODOPA 1 TABLET: 25; 100 TABLET ORAL at 08:02

## 2019-02-24 RX ADMIN — SODIUM CHLORIDE: 0.9 INJECTION, SOLUTION INTRAVENOUS at 04:02

## 2019-02-24 RX ADMIN — APIXABAN 2.5 MG: 2.5 TABLET, FILM COATED ORAL at 11:02

## 2019-02-24 RX ADMIN — APIXABAN 2.5 MG: 2.5 TABLET, FILM COATED ORAL at 08:02

## 2019-02-24 RX ADMIN — ASPIRIN 81 MG: 81 TABLET, COATED ORAL at 08:02

## 2019-02-24 RX ADMIN — CARBIDOPA AND LEVODOPA 1 TABLET: 25; 100 TABLET ORAL at 04:02

## 2019-02-24 RX ADMIN — DIGOXIN 125 MCG: 125 TABLET ORAL at 08:02

## 2019-02-24 RX ADMIN — SODIUM CHLORIDE: 0.9 INJECTION, SOLUTION INTRAVENOUS at 05:02

## 2019-02-24 RX ADMIN — CARBIDOPA AND LEVODOPA 1 TABLET: 25; 100 TABLET ORAL at 11:02

## 2019-02-24 RX ADMIN — AZTREONAM 1000 MG: 1 INJECTION, POWDER, LYOPHILIZED, FOR SOLUTION INTRAMUSCULAR; INTRAVENOUS at 04:02

## 2019-02-24 RX ADMIN — AZTREONAM 1000 MG: 1 INJECTION, POWDER, LYOPHILIZED, FOR SOLUTION INTRAMUSCULAR; INTRAVENOUS at 05:02

## 2019-02-24 NOTE — PLAN OF CARE
Problem: Physical Therapy Goal  Goal: Physical Therapy Goal  Goals to be met by:Discharge    Patient will increase functional independence with mobility by performin. Supine to sit with MInimal Assistance  2. Sit to stand transfer with Minimal Assistance  3. Bed to chair transfer with Minimal Assistance using Rolling Walker  4. Gait  x 50 feet with Minimal Assistance using Rolling Walker.     Outcome: Ongoing (interventions implemented as appropriate)  PT eval completed; goals established as appropriate to improve functional mobility. DC Rec: SNF

## 2019-02-24 NOTE — NURSING
ADAM Nickerson, reports they saw patient and finished with patient. Patient sitting up eating lunch. Will continue to monitor patient

## 2019-02-24 NOTE — PLAN OF CARE
Problem: Adult Inpatient Plan of Care  Goal: Plan of Care Review  Outcome: Ongoing (interventions implemented as appropriate)  Pt's resting. No c/o pain throughout the night. Removed indwelling catheter and replaced with condom catheter. VSS. Able to remain off Levo for shift. Bed in lowest position and locked. Call light within reach. Will continue to monitor.

## 2019-02-24 NOTE — PLAN OF CARE
02/24/19 1027   Type of Frequent Check   Type Patient Rounds;Other (see comments)  (VN Rounds)   Safety/Activity   Patient Rounds bed in low position;visualized patient   Safety Promotion/Fall Prevention instructed to call staff for mobility;side rails raised x 2   Activity Management activity adjusted per tolerance   Positioning   Body Position supine   Head of Bed (HOB) HOB at 60-90 degrees   Assessments (Pre/Post)   Level of Consciousness (AVPU) alert

## 2019-02-24 NOTE — NURSING TRANSFER
Nursing Transfer Note      2/24/2019     Transfer To: ROOM 522    Transfer via bed    Transfer with cardiac monitoring    Transported by Misa Pleitez RN    Medicines sent: No    Chart send with patient: Yes    Notified: son at bedside    Patient reassessed at: 2/23/19  1032    Upon arrival to floor: patient oriented to room, call bell in reach and bed in lowest position

## 2019-02-24 NOTE — PT/OT/SLP EVAL
Physical Therapy Evaluation    Patient Name:  Vipul Green   MRN:  [id number]    Recommendations:     Discharge Recommendations:  nursing facility, skilled   Discharge Equipment Recommendations: none   Barriers to discharge: impaired funcitonal mobility    Assessment:     Vipul Green is a 85 y.o. male admitted with a medical diagnosis of Septic shock.  He presents with the following impairments/functional limitations:  weakness, impaired balance, impaired self care skills, decreased coordination, decreased safety awareness, decreased ROM, impaired endurance, impaired functional mobilty, pain, impaired coordination, decreased lower extremity function, impaired cognition, gait instability, impaired sensation, impaired cardiopulmonary response to activity. Pt requires max A for basic functional bed mobility; sit <> stand transfer and gait x5ft c/ RW.    Rehab Prognosis: Good; patient would benefit from acute skilled PT services to address these deficits and reach maximum level of function.    Recent Surgery: * No surgery found *      Plan:     During this hospitalization, patient to be seen 5 x/week to address the identified rehab impairments via gait training, therapeutic activities, therapeutic exercises, neuromuscular re-education and progress toward the following goals:    · Plan of Care Expires:  03/24/19    Subjective     Chief Complaint: pain in back  Patient/Family Comments/goals: agreed to PT/OT  Pain/Comfort:  · Pain Rating 1: 4/10  · Location 1: back  · Pain Addressed 1: Cessation of Activity, Reposition, Nurse notified, Distraction  · Pain Rating Post-Intervention 1: 4/10    Patients cultural, spiritual, Gnosticist conflicts given the current situation: no    Living Environment:  Lives in [facility].  Prior to admission, patients level of function needed assist with ambulation for ?short distances, assist with dressing and functional ADLS. Equipment used at home: wheelchair, walker, rolling.   DME owned (not currently used): none.  Upon discharge, patient will have assistance from nursing home staff.    Objective:     Communicated with ROMINA Hopkins prior to session.  Patient found all lines intact, call button in reach, bed alarm on, spencer catheter, SCD, pressure relief boots, telemetry  upon PT entry to room.    General Precautions: Standard, fall   Orthopedic Precautions:N/A   Braces: N/A     Exams:  · Cognitive Exam:  Patient is oriented to Person, Place, Time and Situation  · Gross Motor Coordination:  shon UE resting tremors  · Postural Exam:  Patient presented with the following abnormalities:    · -       Rounded shoulders  · -       Forward head  · -       Affected scapula elevated  · -       Posterior pelvic tilt  · -       Abnormal trunk flexion  · -       Kyphosis  · RLE ROM: WFL  · RLE Strength: WFL  · LLE ROM: WFL  · LLE Strength: WFL    Functional Mobility:  · Bed Mobility:     · Rolling Left:  minimum assistance  · Rolling Right: minimum assistance  · Supine to Sit: maximal assistance  · Sit to Supine: maximal assistance  · Transfers:     · Sit to Stand:  maximal assistance with rolling walker and postural control as described above  · Gait: 5ft via side steps at edge of bed. Demo small inconsistent step length requiring min A on RW and VC to facilitate safety c/ gait.  · Balance: Sitting EOB; fair c/ demo for forward body lean and mod A to correct    Standing c/ RW: poor+       Therapeutic Activities and Exercises:   PT Eval completed; POC established to address deficits to improve functional mobility as tolerated to reduce caregiver burden of care.    AM-PAC 6 CLICK MOBILITY  Total Score:12     Patient left supine with all lines intact, call button in reach, bed alarm on, bed alarm off and RN notified.    GOALS:   Multidisciplinary Problems     Physical Therapy Goals        Problem: Physical Therapy Goal    Goal Priority Disciplines Outcome Goal Variances Interventions   Physical Therapy Goal      PT, PT/OT      Description:  Goals to be met by:Discharge    Patient will increase functional independence with mobility by performin. Supine to sit with MInimal Assistance  2. Sit to stand transfer with Minimal Assistance  3. Bed to chair transfer with Minimal Assistance using Rolling Walker  4. Gait  x 50 feet with Minimal Assistance using Rolling Walker.                       History:     Past Medical History:   Diagnosis Date    Asbestosis     Coronary artery disease     Hyperlipidemia     Stroke        Past Surgical History:   Procedure Laterality Date    CARDIAC CATHETERIZATION      CARDIAC VALVE SURGERY      CORONARY ARTERY BYPASS GRAFT      INSERT / REPLACE / REMOVE PACEMAKER         Time Tracking:     PT Received On: 19  PT Start Time: 1209     PT Stop Time: 1237  PT Total Time (min): 28 min     Billable Minutes: Evaluation 10      Joe Gregory, PT  2019

## 2019-02-24 NOTE — PROGRESS NOTES
PGY-3 Progress Note [facility]    Follow up for:   Chief Complaint   Patient presents with    Altered Mental Status     To ER with c/o AMS and SOB per EMS from [facility] home starting today.    Shortness of Breath       Hospital Stay Day 2    Subjective: AF VSS, good uop, +bm/+flatus, tolerating diet without n/v, ambulating.   Denies any CP, SOB, N/V/D, headaches, fever or chills.     Scheduled Meds:   apixaban  2.5 mg Oral BID    aspirin  81 mg Oral Daily    aztreonam  1,000 mg Intravenous Q8H    carbidopa-levodopa  mg  1 tablet Oral TID    digoxin  125 mcg Oral Daily    midodrine  5 mg Oral BID WM     Continuous Infusions:   sodium chloride 0.9% 50 mL/hr at 19 0510    norepinephrine bitartrate-D5W Stopped (19 0815)     PRN Meds:acetaminophen    Review of patient's allergies indicates:   Allergen Reactions    Amiodarone analogues     Keflex [cephalexin]        Objectives:     Vital Signs (Most Recent)  Temp: 98.8 °F (37.1 °C) (19)  Pulse: 60 (19)  Resp: (!) 31 (19)  BP: 136/72 (19)  SpO2: 97 % (19)    Vitals(Most Recent)      BP  Min: 85/45  Max: 141/75  Temp  Av.5 °F (36.9 °C)  Min: 97.6 °F (36.4 °C)  Max: 99 °F (37.2 °C)  Pulse  Av.8  Min: 59  Max: 245  Resp  Av.2  Min: 17  Max: 73  SpO2  Av.3 %  Min: 68 %  Max: 100 %  Weight  Av kg (165 lb 5.5 oz)  Min: 75 kg (165 lb 5.5 oz)  Max: 75 kg (165 lb 5.5 oz)             Vitals([id number])  Temp:  [97.6 °F (36.4 °C)-99 °F (37.2 °C)]   Pulse:  []   Resp:  [17-73]   BP: ()/(45-78)   SpO2:  [68 %-100 %]     I & O(Wzqi95g)    Intake/Output Summary (Last 24 hours) at 2019 0839  Last data filed at 2019 0700  Gross per 24 hour   Intake 2490.45 ml   Output 1995 ml   Net 495.45 ml     Urinalysis  No results for input(s): COLORU, CLARITYU, SPECGRAV, PHUR, PROTEINUA, GLUCOSEU, BILIRUBINCON, BLOODU, WBCU, RBCU, BACTERIA, MUCUS, NITRITE, LEUKOCYTESUR,  UROBILINOGEN, HYALINECASTS in the last 24 hours.    General: AAOx3. NAD.  HEENT: NCAT. PERRLA. EOMI.   Neck: Supple. No JVD. No LAD.    CV: RRR. NL S1/S2. No M/R/G.   Chest: NL effort. CTAB. No R/R/W.   Abd: +BS x 4. Soft. ND/NT. No rebound or guarding.   Ext: No C/C/E. Peripheral pulses intact. NL ROM.   Skin: Intact. No rash. No lesions.   Neuro: CN II-XII intact. No focal deficit. Strength 5/5 throughout. Sensation intact.   Psych: Good judgement and reason. No A/V hallucinations. NL affect. No abnormal behaviors noted    LABS  CBC  Recent Labs   Lab 02/22/19 0310 02/23/19 0839 02/24/19 0457   WBC 32.47* 22.57* 15.13*   RBC 3.75* 3.61* 3.58*   HGB 11.4* 10.8* 10.6*   HCT 35.9* 34.0* 33.5*    150 144*   MCV 96 94 94   MCH 30.4 29.9 29.6   MCHC 31.8* 31.8* 31.6*     BMP  Recent Labs   Lab 02/22/19  1040 02/23/19 0453 02/24/19 0457   * 137 134*   K 4.8 4.2 4.3   CO2 19* 21* 24    107 106   BUN 53* 51* 47*   CREATININE 3.2* 2.4* 1.6*   * 107 95       POCT-Glucose  POCT Glucose   Date Value Ref Range Status   02/22/2019 143 (H) 70 - 110 mg/dL Final   02/22/2019 127 (H) 70 - 110 mg/dL Final       Recent Labs   Lab 02/22/19  0310 02/22/19  1040 02/23/19 0453 02/23/19 0839 02/24/19 0457   CALCIUM 8.1* 8.4* 8.3*  --  8.2*   MG 1.5*  --   --  2.4 2.3   PHOS 1.3*  --   --  2.3* 2.3*     LFT  Recent Labs   Lab 02/22/19 0310 02/23/19 0453 02/24/19  0457   PROT 6.1 6.5 6.3   ALBUMIN 2.7* 2.7* 2.5*   BILITOT 1.6* 0.9 0.7   AST 43* 40 31   ALKPHOS 102 94 98   ALT 23 8* 6*         COAGS  Recent Labs   Lab 02/22/19 0310   INR 1.2   APTT 34.1*     CE  Recent Labs   Lab 02/22/19  0310 02/22/19  1930 02/23/19  0850   TROPONINI 0.086* 0.221* 0.120*     ABGs  No results for input(s): PH, PCO2, PO2, HCO3, POCSATURATED, BE in the last 24 hours.  BNP  Recent Labs   Lab 02/22/19 0310   *     UA  No results for input(s): COLORU, CLARITYU, SPECGRAV, PHUR, PROTEINUA, GLUCOSEU, BLOODU, WBCU, RBCU,  BACTERIA, MUCUS in the last 24 hours.    Invalid input(s):  BILIRUBINCON  LAST HbA1c  No results found for: HGBA1C        Imaging  Imaging Results          X-Ray Chest 1 View (Final result)  Result time 02/22/19 01:41:40    Final result by Cl Betts MD (02/22/19 01:41:40)                 Impression:      Interval placement of right internal jugular approach central venous catheter with tip projecting over the distal SVC.  No evidence of pneumothorax..      Electronically signed by: Cl Betts MD  Date:    02/22/2019  Time:    01:41             Narrative:    EXAMINATION:  XR CHEST 1 VIEW    CLINICAL HISTORY:  Encounter for adjustment and management of vascular access device    TECHNIQUE:  Single frontal view of the chest was performed.    COMPARISON:  Chest radiograph 02/21/2019, 11/14/2015    FINDINGS:  There has been interval placement of a right internal jugular approach central venous catheter with tip projecting over the distal SVC.  There is a left chest wall cardiac pacing device with transvenous pacing leads.  There is postoperative change of prior median sternotomy.  Cardiomediastinal silhouette is unchanged.  No new confluent airspace consolidation compared to prior exam or pneumothorax.  Bilateral calcified pleural plaques again noted.                               X-Ray Chest AP Portable (Final result)  Result time 02/21/19 23:33:00    Final result by Amparo Vuong MD (02/21/19 23:33:00)                 Impression:      No acute intrathoracic abnormality.  Cardiomegaly.  Asbestosis exposure.      Electronically signed by: Amparo Vuong  Date:    02/21/2019  Time:    23:33             Narrative:    EXAMINATION:  XR CHEST AP PORTABLE    CLINICAL HISTORY:  Sepsis;    TECHNIQUE:  Single frontal view of the chest was performed.    COMPARISON:  None    FINDINGS:  Median sternotomy changes are present.  There is a left subclavian pacer.  The cardiac silhouette is enlarged.  There are  calcified pleural plaques, which may suggest asbestosis exposure.  There is no pleural effusion or pneumothorax detected.  There is no focal consolidation.  An aortic stent is present.  The hilum appears to be stable.                                Micro:  Microbiology Results (last 7 days)     Procedure Component Value Units Date/Time    Blood culture x two cultures. Draw prior to antibiotics. [[id number]]  (Susceptibility) Collected:  02/21/19 2320    Order Status:  Completed Specimen:  Blood from Peripheral, Antecubital, Left Updated:  02/24/19 0806     Blood Culture, Routine Gram stain judith bottle: Gram negative rods      Blood Culture, Routine Results called to and read back by:Brenda Rivero RN 02/22/2019  14:21     Blood Culture, Routine Gram stain aer bottle: Gram negative rods      Blood Culture, Routine Positive results previously called 02/22/2019  16:59     Blood Culture, Routine --     PROTEUS MIRABILIS  Susceptibility pending       Comment: Previous comment was modified by VERONICA at 08:06 on 02/24/2019  Susceptibility pending  Susceptibility pending         Narrative:       Aerobic and anaerobic    Urine culture [[id number]] Collected:  02/21/19 2353    Order Status:  Completed Specimen:  Urine Updated:  02/23/19 1343     Urine Culture, Routine --     PROTEUS MIRABILIS  >100,000 cfu/ml  Susceptibility pending      Narrative:       Preferred Collection Type->Urine, Clean Catch    Blood culture x two cultures. Draw prior to antibiotics. [[id number]] Collected:  02/21/19 2319    Order Status:  Completed Specimen:  Blood from Peripheral, Antecubital, Right Updated:  02/23/19 1245     Blood Culture, Routine Gram stain aer bottle: Gram negative rods      Blood Culture, Routine Positive results previously called 02/22/2019  17:01     Blood Culture, Routine --     PROTEUS MIRABILIS  For susceptibility see order #[id number]      Narrative:       Aerobic and anaerobic    Culture, Respiratory [[id number]]     Order  Status:  No result Specimen:  Respiratory from Sputum, Expectorated     Sputum gram stain [[id number]]     Order Status:  No result Specimen:  Body Fluid from Mouth     Influenza A & B by Molecular [[id number]] Collected:  02/21/19 2250    Order Status:  Completed Specimen:  Nasopharyngeal Swab Updated:  02/22/19 0042     Influenza A, Molecular Negative     Influenza B, Molecular Negative     Flu A & B Source Nasal swab               Assessment/Plan:   Patient is a 85 y.o. male who has a past medical history of Asbestosis, Coronary artery disease, Hyperlipidemia, and Stroke. presents today with   Active Hospital Problems    Diagnosis  POA    *Septic shock [A41.9, R65.21]  Yes    UTI (urinary tract infection) [N39.0]  Unknown    Parkinson disease [G20]  Yes    Congestive heart failure [I50.9]  Yes    HTN (hypertension) [I10]  Yes    Atrial fibrillation, controlled [I48.91]  Yes      Resolved Hospital Problems   No resolved problems to display.     Urosepsis  Per daughter patient has been treated for UTI for the past week   Patient off levophed since yesterday.  Afebrile overnight.  WBC trending down.22=>15  Blood cultures and urine cultures positive for Proteus, pan-sensitive except for cipro.    Currently on aztreonam, will likely deescalate to Augmentin      Parkinson disease  Resume home Sinemet      Congestive heart failure  CXR not concerning for active exacerbation   No edema seen on exam   BNP mildy elevated at 224  Strict I&O   Daily weights  Lasix held in setting of septic shock.   Continue home digoxin      HTN (hypertension)  Patient with history of HTN   BP meds held in setting of septic shock  Will restart today.  Coreg and Losartan     Atrial fibrillation, controlled  On home Eliquis      Current Diet Order   Procedures    Diet Adult Regular (IDDSI Level 7)     VTE Risk Mitigation (From admission, onward)        Ordered     apixaban tablet 2.5 mg  2 times daily      02/22/19 0310          PT/OT:  ordered and following  Code: DNR  Dispo: will step down to floor, PT/OT recs  Residence:  Patient is resident of [facility].     Case discussed with staff    Shashi Lei Jr., MD  U  HO-3  02/24/2019 8:39 AM

## 2019-02-25 VITALS
HEART RATE: 62 BPM | RESPIRATION RATE: 18 BRPM | DIASTOLIC BLOOD PRESSURE: 71 MMHG | BODY MASS INDEX: 24.59 KG/M2 | HEIGHT: 69 IN | TEMPERATURE: 98 F | WEIGHT: 166 LBS | SYSTOLIC BLOOD PRESSURE: 147 MMHG | OXYGEN SATURATION: 94 %

## 2019-02-25 PROCEDURE — 25000003 PHARM REV CODE 250: Performed by: STUDENT IN AN ORGANIZED HEALTH CARE EDUCATION/TRAINING PROGRAM

## 2019-02-25 PROCEDURE — S0073 INJECTION, AZTREONAM, 500 MG: HCPCS | Performed by: STUDENT IN AN ORGANIZED HEALTH CARE EDUCATION/TRAINING PROGRAM

## 2019-02-25 PROCEDURE — 25000003 PHARM REV CODE 250: Performed by: FAMILY MEDICINE

## 2019-02-25 PROCEDURE — 94761 N-INVAS EAR/PLS OXIMETRY MLT: CPT

## 2019-02-25 PROCEDURE — 97116 GAIT TRAINING THERAPY: CPT

## 2019-02-25 PROCEDURE — 97110 THERAPEUTIC EXERCISES: CPT

## 2019-02-25 RX ORDER — AMOXICILLIN AND CLAVULANATE POTASSIUM 875; 125 MG/1; MG/1
1 TABLET, FILM COATED ORAL 2 TIMES DAILY
Qty: 12 TABLET | Refills: 0 | Status: SHIPPED | OUTPATIENT
Start: 2019-02-25 | End: 2019-02-25 | Stop reason: SDUPTHER

## 2019-02-25 RX ORDER — LOSARTAN POTASSIUM 50 MG/1
50 TABLET ORAL DAILY
Status: DISCONTINUED | OUTPATIENT
Start: 2019-02-25 | End: 2019-02-25 | Stop reason: HOSPADM

## 2019-02-25 RX ORDER — CARVEDILOL 12.5 MG/1
12.5 TABLET ORAL 2 TIMES DAILY WITH MEALS
Status: DISCONTINUED | OUTPATIENT
Start: 2019-02-25 | End: 2019-02-25 | Stop reason: HOSPADM

## 2019-02-25 RX ORDER — AMOXICILLIN AND CLAVULANATE POTASSIUM 875; 125 MG/1; MG/1
1 TABLET, FILM COATED ORAL 2 TIMES DAILY
Qty: 12 TABLET | Refills: 0 | Status: SHIPPED | OUTPATIENT
Start: 2019-02-25 | End: 2019-03-03

## 2019-02-25 RX ADMIN — DIGOXIN 125 MCG: 125 TABLET ORAL at 08:02

## 2019-02-25 RX ADMIN — MIDODRINE HYDROCHLORIDE 5 MG: 5 TABLET ORAL at 08:02

## 2019-02-25 RX ADMIN — CARBIDOPA AND LEVODOPA 1 TABLET: 25; 100 TABLET ORAL at 08:02

## 2019-02-25 RX ADMIN — AZTREONAM 1000 MG: 1 INJECTION, POWDER, LYOPHILIZED, FOR SOLUTION INTRAMUSCULAR; INTRAVENOUS at 08:02

## 2019-02-25 RX ADMIN — LOSARTAN POTASSIUM 50 MG: 50 TABLET, FILM COATED ORAL at 12:02

## 2019-02-25 RX ADMIN — CARBIDOPA AND LEVODOPA 1 TABLET: 25; 100 TABLET ORAL at 02:02

## 2019-02-25 RX ADMIN — APIXABAN 2.5 MG: 2.5 TABLET, FILM COATED ORAL at 08:02

## 2019-02-25 RX ADMIN — ACETAMINOPHEN 650 MG: 325 TABLET ORAL at 09:02

## 2019-02-25 RX ADMIN — AZTREONAM 1000 MG: 1 INJECTION, POWDER, LYOPHILIZED, FOR SOLUTION INTRAMUSCULAR; INTRAVENOUS at 01:02

## 2019-02-25 RX ADMIN — ASPIRIN 81 MG: 81 TABLET, COATED ORAL at 08:02

## 2019-02-25 NOTE — PLAN OF CARE
[facility]    FACILITY TRANSFER ORDERS      Patient Name: Vipul Green  YOB: 1933    PCP: Anthony D Haase Iii, MD   PCP Address: [address] / [place] [address]  PCP Phone Number: [phone]  PCP Fax: [phone]    Encounter Date: 02/25/2019    Admit to: [facility]  Vital Signs:  Routine    Diagnoses:   Active Hospital Problems    Diagnosis  POA    *Septic shock [A41.9, R65.21]  Yes    UTI (urinary tract infection) [N39.0]  Unknown    Parkinson disease [G20]  Yes    Congestive heart failure [I50.9]  Yes    HTN (hypertension) [I10]  Yes    Atrial fibrillation, controlled [I48.91]  Yes      Resolved Hospital Problems   No resolved problems to display.       Allergies:  Review of patient's allergies indicates:   Allergen Reactions    Amiodarone analogues     Keflex [cephalexin]        Diet: regular diet    Activities: Activity as tolerated    Nursing: routine     Labs: per facilty    CONSULTS:    Physical Therapy to evaluate and treat.  and Occupational Therapy to evaluate and treat.    MISCELLANEOUS CARE:  na    WOUND CARE ORDERS  None    Medications: Review discharge medications with patient and family and provide education.      Current Discharge Medication List      START taking these medications    Details   amoxicillin-clavulanate 875-125mg (AUGMENTIN) 875-125 mg per tablet Take 1 tablet by mouth 2 (two) times daily. for 6 days  Qty: 12 tablet, Refills: 0    Comments: Discussed with patient and family. He does not have a PCN allergy and has taken this in the past without issue.         CONTINUE these medications which have NOT CHANGED    Details   apixaban (ELIQUIS) 2.5 mg Tab Take 1 tablet (2.5 mg total) by mouth 2 (two) times daily.  Qty: 60 tablet, Refills: 0      aspirin (ECOTRIN) 81 MG EC tablet Take 81 mg by mouth once daily.      Ca-D3-mag#11-zinc-cupr-man-bor (CALCIUM 600+D3 PLUS) 600 mg calcium- 800 unit-50 mg Tab Take by mouth once daily.       carbidopa-levodopa  mg (SINEMET)  mg per tablet Take 1 tablet by mouth 3 (three) times daily.  Qty: 90 tablet, Refills: 5    Associated Diagnoses: Parkinson disease      carvedilol (COREG) 12.5 MG tablet Take 12.5 mg by mouth 2 (two) times daily with meals.      digoxin (LANOXIN) 125 mcg tablet Take 125 mcg by mouth once daily.        docusate sodium (COLACE) 100 MG capsule Take 100 mg by mouth once daily.      furosemide (LASIX) 40 MG tablet Take 40 mg by mouth 2 (two) times daily.      losartan (COZAAR) 50 MG tablet Take 1 tablet (50 mg total) by mouth once daily.  Qty: 30 tablet, Refills: 5      pantoprazole (PROTONIX) 40 MG tablet Take 40 mg by mouth once daily.      tamsulosin (FLOMAX) 0.4 mg Cp24 Take 0.4 mg by mouth once daily.         STOP taking these medications       lisinopril (PRINIVIL,ZESTRIL) 40 MG tablet Comments:   Reason for Stopping:                    ______________________________  Korina Moulton,   02/25/2019

## 2019-02-25 NOTE — PT/OT/SLP PROGRESS
Physical Therapy Treatment    Patient Name:  Vipul Green   MRN:  [id number]    Recommendations:     Discharge Recommendations:  nursing facility, skilled   Discharge Equipment Recommendations: (TBD)   Barriers to discharge: decreased mobility,strength and endurance    Assessment:     Vipul Green is a 85 y.o. male admitted with a medical diagnosis of Septic shock.  He presents with the following impairments/functional limitations:  weakness, impaired endurance, impaired functional mobilty, gait instability, impaired balance, decreased lower extremity function, pain, decreased ROM, impaired coordination,pt with good participation and requires Mod/Max A with all mobility,pt will benefit from from continuing PT services upon discharge.    Rehab Prognosis: Good; patient would benefit from acute skilled PT services to address these deficits and reach maximum level of function.    Recent Surgery: * No surgery found *      Plan:     During this hospitalization, patient to be seen 5 x/week to address the identified rehab impairments via gait training, therapeutic activities, therapeutic exercises and progress toward the following goals:    · Plan of Care Expires:  03/24/19    Subjective     Chief Complaint: n/a  Patient/Family Comments/goals: pt's L side is weaker than R  Pain/Comfort:  · Pain Rating 1: (no rating)  · Location - Orientation 1: posterior  · Location 1: knee  · Pain Addressed 1: Reposition, Distraction      Objective:     Communicated with nsg prior to session.  Patient found all lines intact, call button in reach, bed alarm on, nsg notified and daughter present bed alarm, pressure relief boots, telemetry, peripheral IV  upon PT entry to room.     General Precautions: Standard, fall   Orthopedic Precautions:N/A   Braces: N/A     Functional Mobility:  · Bed Mobility:     · Supine to Sit: maximal assistance  · Sit to Supine: maximal assistance  · Transfers:     · Sit to Stand:  moderate assistance with  rolling walker  · Gait: amb ~3-4 sidesteps to HOB and ~4' up/back with RW and Mod A  · Balance: poor standing balance      AM-PAC 6 CLICK MOBILITY  Turning over in bed (including adjusting bedclothes, sheets and blankets)?: 3  Sitting down on and standing up from a chair with arms (e.g., wheelchair, bedside commode, etc.): 2  Moving from lying on back to sitting on the side of the bed?: 2  Moving to and from a bed to a chair (including a wheelchair)?: 2  Need to walk in hospital room?: 2  Climbing 3-5 steps with a railing?: 1  Basic Mobility Total Score: 12       Therapeutic Activities and Exercises: le supine ex's X 10-12 reps inc: ap,qs,hs,abd/add,slr       Patient left supine with all lines intact, call button in reach, bed alarm on, nsg notified and daughter present..    GOALS: see general POC  Multidisciplinary Problems     Physical Therapy Goals        Problem: Physical Therapy Goal    Goal Priority Disciplines Outcome Goal Variances Interventions   Physical Therapy Goal     PT, PT/OT Ongoing (interventions implemented as appropriate)     Description:  Goals to be met by:Discharge    Patient will increase functional independence with mobility by performin. Supine to sit with MInimal Assistance  2. Sit to stand transfer with Minimal Assistance  3. Bed to chair transfer with Minimal Assistance using Rolling Walker  4. Gait  x 50 feet with Minimal Assistance using Rolling Walker.                       Time Tracking:     PT Received On: 19  PT Start Time: 953     PT Stop Time: 1021  PT Total Time (min): 28 min     Billable Minutes: Gait Training 16 and Therapeutic Exercise 12    Treatment Type: Treatment  PT/PTA: PTA     PTA Visit Number: 1     Andrew León, PTA  2019

## 2019-02-25 NOTE — PROGRESS NOTES
PGY-3 Progress Note [facility]    Follow up for:   Chief Complaint   Patient presents with    Altered Mental Status     To ER with c/o AMS and SOB per EMS from [facility] starting today.    Shortness of Breath       Hospital Stay Day 3    Subjective: AF VSS, good uop, +bm/+flatus, tolerating diet without n/v, ambulating with PT. Patient endorses a headache. Denies any CP, SOB, N/V/D, headaches, fever or chills.     Scheduled Meds:   apixaban  2.5 mg Oral BID    aspirin  81 mg Oral Daily    aztreonam  1,000 mg Intravenous Q8H    carbidopa-levodopa  mg  1 tablet Oral TID    digoxin  125 mcg Oral Daily    midodrine  5 mg Oral BID WM     Continuous Infusions:   sodium chloride 0.9% 50 mL/hr at 19 1646     PRN Meds:acetaminophen    Review of patient's allergies indicates:   Allergen Reactions    Amiodarone analogues     Keflex [cephalexin]        Objectives:     Vital Signs (Most Recent)  Temp: 98.1 °F (36.7 °C) (19 08)  Pulse: 60 (19)  Resp: 18 (19 08)  BP: (!) 156/79 (19 0834)  SpO2: (!) 94 % (19 0757)    Vitals(Most Recent)      BP  Min: 119/59  Max: 158/76  Temp  Av.8 °F (36.6 °C)  Min: 97.3 °F (36.3 °C)  Max: 98.2 °F (36.8 °C)  Pulse  Av.2  Min: 59  Max: 67  Resp  Av.7  Min: 16  Max: 20  SpO2  Av.3 %  Min: 94 %  Max: 98 %  Weight  Av.3 kg (166 lb 0.1 oz)  Min: 75.3 kg (166 lb 0.1 oz)  Max: 75.3 kg (166 lb 0.1 oz)             Vitals([id number])  Temp:  [97.3 °F (36.3 °C)-98.2 °F (36.8 °C)]   Pulse:  [59-67]   Resp:  [16-20]   BP: (119-158)/(59-87)   SpO2:  [94 %-98 %]     I & O([id number])    Intake/Output Summary (Last 24 hours) at 2019 1004  Last data filed at 2019 0900  Gross per 24 hour   Intake 1590 ml   Output 1776 ml   Net -186 ml     General: AAOx3. NAD.  HEENT: NCAT. PERRLA. EOMI.   Neck: Supple. No JVD. No LAD.    CV: RRR. NL S1/S2. No M/R/G.   Chest: NL effort. CTAB. No R/R/W.   Abd: +BS x 4. Soft. ND/NT. No  rebound or guarding.   Ext: No C/C/E. Peripheral pulses intact. NL ROM.   Skin: Intact. No rash. No lesions.   Neuro: CN II-XII intact. No focal deficit. Strength 5/5 throughout. Sensation intact.   Psych: Good judgement and reason. No A/V hallucinations. NL affect. No abnormal behaviors noted    LABS  CBC  Recent Labs   Lab 02/22/19  0310 02/23/19  0839 02/24/19  0457   WBC 32.47* 22.57* 15.13*   RBC 3.75* 3.61* 3.58*   HGB 11.4* 10.8* 10.6*   HCT 35.9* 34.0* 33.5*    150 144*   MCV 96 94 94   MCH 30.4 29.9 29.6   MCHC 31.8* 31.8* 31.6*     BMP  Recent Labs   Lab 02/22/19  1040 02/23/19  0453 02/24/19  0457   * 137 134*   K 4.8 4.2 4.3   CO2 19* 21* 24    107 106   BUN 53* 51* 47*   CREATININE 3.2* 2.4* 1.6*   * 107 95       POCT-Glucose  POCT Glucose   Date Value Ref Range Status   02/22/2019 143 (H) 70 - 110 mg/dL Final       Recent Labs   Lab 02/22/19  0310 02/22/19  1040 02/23/19  0453 02/23/19  0839 02/24/19  0457   CALCIUM 8.1* 8.4* 8.3*  --  8.2*   MG 1.5*  --   --  2.4 2.3   PHOS 1.3*  --   --  2.3* 2.3*     LFT  Recent Labs   Lab 02/22/19  0310 02/23/19  0453 02/24/19  0457   PROT 6.1 6.5 6.3   ALBUMIN 2.7* 2.7* 2.5*   BILITOT 1.6* 0.9 0.7   AST 43* 40 31   ALKPHOS 102 94 98   ALT 23 8* 6*         COAGS  Recent Labs   Lab 02/22/19  0310   INR 1.2   APTT 34.1*     CE  Recent Labs   Lab 02/22/19  0310 02/22/19  1930 02/23/19  0850   TROPONINI 0.086* 0.221* 0.120*     ABGs  Recent Labs   Lab 02/24/19 2053   PH 7.432   PCO2 29.3*   PO2 72*   HCO3 19.5*   POCSATURATED 95   BE -5     BNP  Recent Labs   Lab 02/22/19  0310   *       Imaging  Imaging Results          X-Ray Chest 1 View (Final result)  Result time 02/22/19 01:41:40    Final result by Cl Betts MD (02/22/19 01:41:40)                 Impression:      Interval placement of right internal jugular approach central venous catheter with tip projecting over the distal SVC.  No evidence of  pneumothorax..      Electronically signed by: Cl Betts MD  Date:    02/22/2019  Time:    01:41             Narrative:    EXAMINATION:  XR CHEST 1 VIEW    CLINICAL HISTORY:  Encounter for adjustment and management of vascular access device    TECHNIQUE:  Single frontal view of the chest was performed.    COMPARISON:  Chest radiograph 02/21/2019, 11/14/2015    FINDINGS:  There has been interval placement of a right internal jugular approach central venous catheter with tip projecting over the distal SVC.  There is a left chest wall cardiac pacing device with transvenous pacing leads.  There is postoperative change of prior median sternotomy.  Cardiomediastinal silhouette is unchanged.  No new confluent airspace consolidation compared to prior exam or pneumothorax.  Bilateral calcified pleural plaques again noted.                               X-Ray Chest AP Portable (Final result)  Result time 02/21/19 23:33:00    Final result by Amparo Vuong MD (02/21/19 23:33:00)                 Impression:      No acute intrathoracic abnormality.  Cardiomegaly.  Asbestosis exposure.      Electronically signed by: Amparo Vuong  Date:    02/21/2019  Time:    23:33             Narrative:    EXAMINATION:  XR CHEST AP PORTABLE    CLINICAL HISTORY:  Sepsis;    TECHNIQUE:  Single frontal view of the chest was performed.    COMPARISON:  None    FINDINGS:  Median sternotomy changes are present.  There is a left subclavian pacer.  The cardiac silhouette is enlarged.  There are calcified pleural plaques, which may suggest asbestosis exposure.  There is no pleural effusion or pneumothorax detected.  There is no focal consolidation.  An aortic stent is present.  The hilum appears to be stable.                                Micro:  Microbiology Results (last 7 days)     Procedure Component Value Units Date/Time    Blood culture [[id number]]     Order Status:  No result Specimen:  Blood     Blood culture x two cultures. Draw  prior to antibiotics. [[id number]] Collected:  02/21/19 2319    Order Status:  Completed Specimen:  Blood from Peripheral, Antecubital, Right Updated:  02/24/19 1130     Blood Culture, Routine Gram stain aer bottle: Gram negative rods      Blood Culture, Routine Positive results previously called 02/22/2019  17:01     Blood Culture, Routine --     PROTEUS MIRABILIS  For susceptibility see order #[id number]      Narrative:       Aerobic and anaerobic    Blood culture x two cultures. Draw prior to antibiotics. [[id number]]  (Susceptibility) Collected:  02/21/19 2320    Order Status:  Completed Specimen:  Blood from Peripheral, Antecubital, Left Updated:  02/24/19 1130     Blood Culture, Routine Gram stain judith bottle: Gram negative rods      Blood Culture, Routine Results called to and read back by:Brenda Rivero RN 02/22/2019  14:21     Blood Culture, Routine Gram stain aer bottle: Gram negative rods      Blood Culture, Routine Positive results previously called 02/22/2019  16:59     Blood Culture, Routine PROTEUS MIRABILIS    Narrative:       Aerobic and anaerobic    Urine culture [[id number]]  (Susceptibility) Collected:  02/21/19 2353    Order Status:  Completed Specimen:  Urine Updated:  02/24/19 1121     Urine Culture, Routine --     PROTEUS MIRABILIS  >100,000 cfu/ml      Narrative:       Preferred Collection Type->Urine, Clean Catch    Culture, Respiratory [[id number]]     Order Status:  No result Specimen:  Respiratory from Sputum, Expectorated     Sputum gram stain [[id number]]     Order Status:  No result Specimen:  Body Fluid from Mouth     Influenza A & B by Molecular [[id number]] Collected:  02/21/19 2250    Order Status:  Completed Specimen:  Nasopharyngeal Swab Updated:  02/22/19 0042     Influenza A, Molecular Negative     Influenza B, Molecular Negative     Flu A & B Source Nasal swab               Assessment/Plan:   Patient is a 85 y.o. male who has a past medical history of Asbestosis, Coronary artery  disease, Hyperlipidemia, and Stroke. presents today with   Active Hospital Problems    Diagnosis  POA    *Septic shock [A41.9, R65.21]  Yes    UTI (urinary tract infection) [N39.0]  Unknown    Parkinson disease [G20]  Yes    Congestive heart failure [I50.9]  Yes    HTN (hypertension) [I10]  Yes    Atrial fibrillation, controlled [I48.91]  Yes      Resolved Hospital Problems   No resolved problems to display.     Urosepsis  Per daughter patient has been treated for UTI for the past week   Patient off levophed since.  Remains afebrile. WBC trending down.22=>15  Blood cultures and urine cultures positive for Proteus, pan-sensitive except for cipro.    Currently on aztreonam, will deescalate to Augmentin on discharge     Parkinson disease  Resume home Sinemet      Congestive heart failure  CXR not concerning for active exacerbation   No edema seen on exam   BNP mildy elevated at 224  Strict I&O   Daily weights  Lasix held in setting of septic shock.   Continue home digoxin      HTN (hypertension)  Patient with history of HTN   BP meds held in setting of septic shock  Will restart Coreg and Losartan     Atrial fibrillation, controlled  On home Eliquis      Current Diet Order   Procedures    Diet Adult Regular (IDDSI Level 7)     VTE Risk Mitigation (From admission, onward)        Ordered     apixaban tablet 2.5 mg  2 times daily      02/22/19 0310          PT/OT: ordered and following  Code: DNR  Dispo: PT/OT recs  Residence: Patient is resident of [facility]. Will D/C to SNF with PO augmentin.      Case discussed with staff    Shashi Lei Jr., MD  [facility] HO-3  02/25/2019 8:39 AM

## 2019-02-25 NOTE — PLAN OF CARE
VN cued into pt's room for introduction. VN informed pt and family that VN would be working along side bedside nurse and PCT throughout shift. Level of present pain assessed. At present no distress noted.Thoroughly discussed today's plan of care with patient and family. Discussed with patient High fall risk protocol and interventions that have been initiated and cont be in place for safety. Patient verbalized clear understanding and cooperation using teach back method. Bed alarm presently activated and in use. Will cont to be available to patient and intervene prn.

## 2019-02-25 NOTE — PLAN OF CARE
AVS and educational attachments shared with patient and daughter via [employer]. Discussed thoroughly. Patient and daughter verbalized clear understanding using teach back method. Notified bedside nurse of completion of education. At present no distress noted. Patient to be discharged via w/c van service and family with all of patient's belonings. Will cont to be available to patient and family and intervene prn.

## 2019-02-25 NOTE — PLAN OF CARE
Problem: Physical Therapy Goal  Goal: Physical Therapy Goal  Goals to be met by:Discharge    Patient will increase functional independence with mobility by performin. Supine to sit with MInimal Assistance  2. Sit to stand transfer with Minimal Assistance  3. Bed to chair transfer with Minimal Assistance using Rolling Walker  4. Gait  x 50 feet with Minimal Assistance using Rolling Walker.      Outcome: Ongoing (interventions implemented as appropriate)  Goals ongoing

## 2019-02-25 NOTE — DISCHARGE INSTRUCTIONS
Amoxicillin; Clavulanic Acid tablets (English) View Edit Remove  Sepsis, Understanding (English) View Edit Remove

## 2019-02-25 NOTE — PLAN OF CARE
Report called to Nurse Simon at [facility]. Opportunity given for questions. All questions answered. Notified nurse of estimated transport time.

## 2019-02-25 NOTE — PT/OT/SLP EVAL
Occupational Therapy   Evaluation/treatment    Name: Vipul Green  MRN: [id number]  Admitting Diagnosis:  Septic shock     The primary encounter diagnosis was Septic shock. Diagnoses of Sepsis, Encounter for central line placement, Acute cystitis with hematuria, Atrial fibrillation, controlled, and Congestive heart failure, unspecified HF chronicity, unspecified heart failure type were also pertinent to this visit.      Recommendations:     Discharge Recommendations: nursing facility, skilled  Discharge Equipment Recommendations:  (TBD)  Barriers to discharge:  None    Assessment:     Vipul Green is a 85 y.o. male with a medical diagnosis of Septic shock.  He presents with debility and requires extensive assist with ADLS and fx mobility. EH would benefit from SNF post acute .  Continue with OT POC.. Performance deficits affecting function: weakness, impaired endurance, impaired self care skills, impaired functional mobilty, impaired balance, gait instability, decreased coordination, decreased upper extremity function, decreased lower extremity function, decreased safety awareness, pain, impaired coordination, impaired cardiopulmonary response to activity.      Rehab Prognosis: Good; patient would benefit from acute skilled OT services to address these deficits and reach maximum level of function.       Plan:     Patient to be seen 5 x/week to address the above listed problems via self-care/home management, therapeutic exercises, therapeutic activities  · Plan of Care Expires: 03/24/19  · Plan of Care Reviewed with: patient    Subjective     Chief Complaint: none  Patient/Family Comments/goals: none    Occupational Profile:  Living Environment: resident at [facility]  Previous level of function: pt.reportredly ambulates with RW with assist; uses wc mod I; assisted minimally with dressing and bathing; toileting Jae from wc level; performed transfers Jae.  Roles and Routines: fairly active attends activities  in facility  Equipment Used at Home:  walker, rolling, wheelchair  Assistance upon Discharge: staff at NH    Pain/Comfort:  · Pain Rating 1: 4/10  · Location - Side 1: Bilateral  · Location - Orientation 1: generalized  · Location 1: back  · Pain Addressed 1: Reposition, Distraction, Pre-medicate for activity, Cessation of Activity  · Pain Rating Post-Intervention 1: 4/10    Patients cultural, spiritual, Restoration conflicts given the current situation: no    Objective:     Communicated with: nurseprior to session.  Patient found HOB elevated with bed alarm, SCD, telemetry, pressure relief boots upon OT entry to room.    General Precautions: Standard, fall   Orthopedic Precautions:N/A   Braces: N/A     Occupational Performance:    Bed Mobility:    · Patient completed Rolling/Turning to Left with  minimum assistance and with side rail  · Patient completed Scooting/Bridging with maximal assistance and with side rail  · Patient completed Supine to Sit with maximal assistance and 1 persons  · Patient completed Sit to Supine with maximal assistance    Functional Mobility/Transfers:  · Patient completed Sit <> Stand Transfer with maximal assistance  with  rolling walker   · Functional Mobility: side stepped with max assist with RW toward HOB; flexed forward posture;max vc to stand erect but only could correct minimally 2/2 severely kyphotic.     Activities of Daily Living:  · Feeding:  setup HOB elevated and positioning for optimal UE reaching fo food items HOB elevated  · Grooming: supervision washed face with HOB elevated  · Lower Body Dressing: total assistance socks in bed  · Toileting: total assistance johanna    Cognitive/Visual Perceptual:  Cognitive/Psychosocial Skills:     -       Oriented to: Person, Place, Time and Situation   -       Follows Commands/attention:Follows one-step commands  -       Communication: clear/fluent  -       Memory: Poor immediate recall  -       Safety awareness/insight to disability:  impaired   -       Mood/Affect/Coping skills/emotional control: Appropriate to situation  Visual/Perceptual:      -Intact .    Physical Exam:  Balance:    -       sitting; fair plus  dynamic; fair  standing: poor   Postural examination/scapula alignment:    -       Rounded shoulders  -       Forward head  -       Posterior pelvic tilt  -       Abnormal trunk flexion  -       Kyphosis  Dominant hand:    -       rihgt  Upper Extremity Range of Motion:     -       Right Upper Extremity: WFL  -       Left Upper Extremity: WFL  Upper Extremity Strength:    -       Right Upper Extremity: Deficits: 4-/5 grossly  -       Left Upper Extremity: Deficits: grossly 4-/5   Strength:    -       Right Upper Extremity: WFL  -       Left Upper Extremity: WFL  Fine Motor Coordination:    -       Impaired  Left hand, manipulation of objects mild and Right hand, manipulation of objects mild-mild resting tremors from parkinsons disease    AMPA 6 Click ADL:  AMPA Total Score: 14    Treatment & Education:  Role of OT and POC  Feeding setup in bed for optimal reach for food items with setup  Grooming sup wasghd face; sit<>stand max assist with RW in prep for OB BSC transfers.     Education:    Patient left HOB elevated with all lines intact, call button in reach, bed alarm on and nurse notified    GOALS:   Multidisciplinary Problems     Occupational Therapy Goals        Problem: Occupational Therapy Goal    Goal Priority Disciplines Outcome Interventions   Occupational Therapy Goal     OT, PT/OT Ongoing (interventions implemented as appropriate)    Description:  Goals to be met by: 3/24/2019    Patient will increase functional independence with ADLs by performing:    UE Dressing with Supervision.  LE Dressing with Contact Guard Assistance.  Grooming while standing with Stand-by Assistance.  Rolling to Bilateral with Modified [place].   Supine to sit with Modified [place].  Step transfer with Stand-by Assistance  Toilet  transfer to bedside commode with Stand-by Assistance.  Increased functional strength to WFL for UE.  Upper extremity exercise program 10 reps per handout, with supervision.                      History:     Past Medical History:   Diagnosis Date    Asbestosis     Coronary artery disease     Hyperlipidemia     Stroke        Past Surgical History:   Procedure Laterality Date    CARDIAC CATHETERIZATION      CARDIAC VALVE SURGERY      CORONARY ARTERY BYPASS GRAFT      INSERT / REPLACE / REMOVE PACEMAKER         Time Tracking:     OT Date of Treatment: 02/24/19  OT Start Time: 1212  OT Stop Time: 1237  OT Total Time (min): 25 min COTX with pt    Billable Minutes:Evaluation 10  Self Care/Home Management 8  Total Time 18    Liya Stock OT  2/24/2019

## 2019-02-25 NOTE — PLAN OF CARE
Problem: Adult Inpatient Plan of Care  Goal: Plan of Care Review  Outcome: Ongoing (interventions implemented as appropriate)  Received pt on RA; no distress noted

## 2019-02-25 NOTE — PLAN OF CARE
Problem: Occupational Therapy Goal  Goal: Occupational Therapy Goal  Goals to be met by: 3/24/2019    Patient will increase functional independence with ADLs by performing:    UE Dressing with Supervision.  LE Dressing with Contact Guard Assistance.  Grooming while standing with Stand-by Assistance.  Rolling to Bilateral with Modified [place].   Supine to sit with Modified [place].  Step transfer with Stand-by Assistance  Toilet transfer to bedside commode with Stand-by Assistance.  Increased functional strength to WFL for UE.  Upper extremity exercise program 10 reps per handout, with supervision.    Outcome: Ongoing (interventions implemented as appropriate)  OT initial eval completed and treatment initiated.  Pt would benefit from SNF post acute.  Continue with OT POC.

## 2019-02-25 NOTE — PLAN OF CARE
02/25/19 1321   Post-Acute Status   Post-Acute Authorization Placement   Post-Acute Placement Status Patient Evaluation by Facility     Transfer facility orders faxed to [facility] via [employer] to review for readmission today.

## 2019-02-25 NOTE — PLAN OF CARE
TN met with patient and informed him on going back to [facility] today. Pt daughter Hortencia in room feeding patient lunch and is aware of plan as well. Pt was informed that wheelchair van will be arranged to bring him back. He states verbal understanding. Pt has no DME or HH needs. Pt has no further questions regarding discharge plan. He states that he is ready to go back and see his friends.     Future Appointments   Date Time Provider Department Center   3/28/2019  9:00 AM Ollie Retana MD [facility] CARDIO [facility]        02/25/19 1302   Discharge Reassessment   Assessment Type Discharge Planning Reassessment   Provided patient/caregiver education on the expected discharge date and the discharge plan Yes   Do you have any problems affording any of your prescribed medications? No   Discharge Plan A Return to nursing home   DME Needed Upon Discharge  none   Patient choice form signed by patient/caregiver N/A   [facility]tic[facility]ated [facility]charge [facility]   Can the patient answer the patient profile reliably? Yes, cognitively intact   How does the patient rate their overall health at the present time? Good   Describe the patient's ability to walk at the present time. Does not walk or unable to take any steps at all   How often would a person be available to care for the patient? Whenever needed   Number of comorbid conditions (as recorded on the chart) Four

## 2019-02-25 NOTE — PT/OT/SLP PROGRESS
Occupational Therapy  Visit Attempt    Patient Name:  Vipul Green   MRN:  [id number]    Patient not seen today secondary to getting bed bath with PCT. Patient is set to D/[facility]. Will follow-up.    ANDREW Ott  2/25/2019

## 2019-02-25 NOTE — PLAN OF CARE
02/25/19 1434   Post-Acute Status   Post-Acute Authorization Placement   Post-Acute Placement Status Authorization Obtained      made phone contact with [facility] , Tamie Johnston to check status of admission.   reported patient can readmit to room 516 and nurse can call report to unit 2 at [phone].  Tamie reported she will send the [employer] van to pick patient up.    Bedside nurse, Edle informed patient is ready for discharge/readmit to [facility].  She can call report to number located on front of ambulance packet,  The [facility] van will pick patient up within the hour.

## 2019-02-25 NOTE — PLAN OF CARE
02/25/19 1231   Post-Acute Status   Post-Acute Authorization Placement   Post-Acute Placement Status Additional Clinical Requested      attempted to make phone contact with [facility]  to inform her patient is ready for readmission today.   was not available, left voice.  Clinicals faxed to  to review for readmission today.

## 2019-02-25 NOTE — PROGRESS NOTES
made phone contact with Sanjay at the patient flow center and cancelled wheelchair van scheduled by transition navigator, Deedee Washington.  [facility] will provide transportation.

## 2019-02-25 NOTE — PLAN OF CARE
Problem: Adult Inpatient Plan of Care  Goal: Plan of Care Review  Outcome: Ongoing (interventions implemented as appropriate)  Pt in NAD. AAOx4. VSS. No c/o pain. Pt wears diaper. IV fluids and IV antibiotics. Safety maintained. Called MD to ask about vanc trough order since vanc was discontinued x2 days ago. MD states vanc trough is not needed and he will cancel order. Will continue to monitor.

## 2019-02-25 NOTE — DISCHARGE SUMMARY
[facility]  Discharge Summary      Admit Date: 2/21/2019    Discharge Date and Time:  02/25/2019    Attending Physician: Riccardo Wells MD     Reason for Admission: Septic shock secondary to urosepsis    Procedures Performed: None    Hospital Course   Vipul Green is a 85 y.o. male with history of Parkinson disease, Pacemaker in place, Afib, HLD, CHF, s/p AVR who was admitted due to septic shock secondary to urosepsis. In the ER patient was febrile, tachypneic, and hypotensive. Lactic acid was 6, no leukocytosis. Blood pressure did not respond to fluids and he was started on pressors. Sepsis protocol followed.    Patient was admitted and sent to the ICU. He was started on Vancomycin and Gentamicin. UA 3+ leukocytes, positive nitrates, and many bacteria. Blood and urine cultures grew gram negative rods. Antibiotics de-escalated to Vancomycin and Aztreonam. Lactic acid normalized. Patient was weaned off levophed, blood pressure stabilized.      Final blood and urine cultures grew proteus mirabilis pan sensitive to everything except Ciprofloxacin. Antibiotics de-escalated to Aztreonam. Symptomatically patient felt much improved. He was discharged back to his skilled nursing facility on Augmentin.      Consults: PT and OT    Significant Diagnostic Studies:   Labs:   CMP   Recent Labs   Lab 02/24/19  0457   *   K 4.3      CO2 24   GLU 95   BUN 47*   CREATININE 1.6*   CALCIUM 8.2*   PROT 6.3   ALBUMIN 2.5*   BILITOT 0.7   ALKPHOS 98   AST 31   ALT 6*   ANIONGAP 4*   ESTGFRAFRICA 45*   EGFRNONAA 39*     CBC   Recent Labs   Lab 02/24/19  0457   WBC 15.13*   HGB 10.6*   HCT 33.5*   *     Microbiology:   Blood Culture   Lab Results   Component Value Date    LABBLOO Gram stain judith bottle: Gram negative rods  02/21/2019    LABBLOO  02/21/2019     Results called to and read back by:Brenda Rivero RN 02/22/2019  14:21    LABBLOO Gram stain aer bottle: Gram negative rods  02/21/2019     LABBLOO  02/21/2019     Positive results previously called 02/22/2019  16:59    LABBLOO PROTEUS MIRABILIS 02/21/2019      Urine Culture      Lab Results   Component Value Date    LABURIN PROTEUS MIRABILIS  >100,000 cfu/ml   02/21/2019     Radiology: X-Ray: CXR: X-Ray Chest 1 View (CXR):   Results for orders placed or performed during the hospital encounter of 02/21/19   X-Ray Chest 1 View    Narrative    EXAMINATION:  XR CHEST 1 VIEW    CLINICAL HISTORY:  Encounter for adjustment and management of vascular access device    TECHNIQUE:  Single frontal view of the chest was performed.    COMPARISON:  Chest radiograph 02/21/2019, 11/14/2015    FINDINGS:  There has been interval placement of a right internal jugular approach central venous catheter with tip projecting over the distal SVC.  There is a left chest wall cardiac pacing device with transvenous pacing leads.  There is postoperative change of prior median sternotomy.  Cardiomediastinal silhouette is unchanged.  No new confluent airspace consolidation compared to prior exam or pneumothorax.  Bilateral calcified pleural plaques again noted.      Impression    Interval placement of right internal jugular approach central venous catheter with tip projecting over the distal SVC.  No evidence of pneumothorax..      Electronically signed by: Cl Betts MD  Date:    02/22/2019  Time:    01:41     Ultrasound: Retroperitoneal Complete  Findings suggesting medical renal disease, possibly acute on chronic on the right.    Although the urinary bladder is decompressed around a Pina catheter, there is wall thickening, correlation with urinalysis as warranted.    Final Diagnoses:    Principal Problem: Septic shock   Secondary Diagnoses:   Active Hospital Problems    Diagnosis  POA    *Septic shock [A41.9, R65.21]  Yes    UTI (urinary tract infection) [N39.0]  Unknown    Parkinson disease [G20]  Yes    Congestive heart failure [I50.9]  Yes    HTN (hypertension) [I10]   Yes    Atrial fibrillation, controlled [I48.91]  Yes      Resolved Hospital Problems   No resolved problems to display.       Discharged Condition: stable    Disposition: Skilled Nursing Facility    Follow Up/Patient Instructions:     Medications:  Reconciled Home Medications:      Medication List      START taking these medications    amoxicillin-clavulanate 875-125mg 875-125 mg per tablet  Commonly known as:  AUGMENTIN  Take 1 tablet by mouth 2 (two) times daily. for 6 days        CONTINUE taking these medications    apixaban 2.5 mg Tab  Commonly known as:  ELIQUIS  Take 1 tablet (2.5 mg total) by mouth 2 (two) times daily.     aspirin 81 MG EC tablet  Commonly known as:  ECOTRIN  Take 81 mg by mouth once daily.     CALCIUM 600-D3 PLUS 600 mg calcium- 800 unit-50 mg Tab  Generic drug:  keo-[id number]-[id number]-zinc--winsome-bor  Take by mouth once daily.     carbidopa-levodopa  mg  mg per tablet  Commonly known as:  SINEMET  Take 1 tablet by mouth 3 (three) times daily.     carvedilol 12.5 MG tablet  Commonly known as:  COREG  Take 12.5 mg by mouth 2 (two) times daily with meals.     digoxin 125 mcg tablet  Commonly known as:  LANOXIN  Take 125 mcg by mouth once daily.     docusate sodium 100 MG capsule  Commonly known as:  COLACE  Take 100 mg by mouth once daily.     furosemide 40 MG tablet  Commonly known as:  LASIX  Take 40 mg by mouth 2 (two) times daily.     losartan 50 MG tablet  Commonly known as:  COZAAR  Take 1 tablet (50 mg total) by mouth once daily.     pantoprazole 40 MG tablet  Commonly known as:  PROTONIX  Take 40 mg by mouth once daily.     tamsulosin 0.4 mg Cap  Commonly known as:  FLOMAX  Take 0.4 mg by mouth once daily.        STOP taking these medications    lisinopril 40 MG tablet  Commonly known as:  PRINIVIL,ZESTRIL          Discharge Procedure Orders   Diet Adult Regular     Notify your health care provider if you experience any of the following:  temperature >100.4     Notify your health  care provider if you experience any of the following:  persistent nausea and vomiting or diarrhea     Notify your health care provider if you experience any of the following:  severe uncontrolled pain     Notify your health care provider if you experience any of the following:  redness, tenderness, or signs of infection (pain, swelling, redness, odor or green/yellow discharge around incision site)     Activity as tolerated

## 2019-02-27 ENCOUNTER — OUTSIDE PLACE OF SERVICE (OUTPATIENT)
Dept: FAMILY MEDICINE | Facility: CLINIC | Age: 84
End: 2019-02-27
Payer: MEDICARE

## 2019-02-27 PROCEDURE — 99307 SBSQ NF CARE SF MDM 10: CPT | Mod: ,,, | Performed by: FAMILY MEDICINE

## 2019-02-27 PROCEDURE — 99307 PR NURSING FAC CARE, SUBSEQ, IMPROVING: ICD-10-PCS | Mod: ,,, | Performed by: FAMILY MEDICINE

## 2019-03-11 NOTE — PROGRESS NOTES
1/30/2019      Primary MD: Richie Floyd  Primary Cardiologist: Ollie Retana   Implanting MD:    and serial number: [employer]; Lotus [id number], [id number]  Implant date: 5/10/2011     Reason for evaluation:routine  Indication for pacemaker: CHB     Measurements  Atrial sensing - N/A  Ventricular sensing - R wave: N/A CHB  Ventricular capture: RV Maintained: 037.5 V @ 0.4 ms   Ventricular lead impedance: RV: 877 ohms  Underlying rhythm: V paced       Diagnostic Data  Atrial paced: N/A Ventricular paced: 99.9 %  Mode switch: V paced   Sensor response: not available  Battery status: satisfactory Magnet rate: 99.4 bpm   Estimated battery longevity:  3.0 years     Final Parameters  Mode: VVIR Lower rate: 60 bpm Upper rate: 130 bpm  Ventricular - Amplitude: 2.0 V Pulse width: 0.4 ms Sensitivity: 4.0 mV         Changes made: none        Follow up: 6 months

## 2019-03-20 ENCOUNTER — OUTSIDE PLACE OF SERVICE (OUTPATIENT)
Dept: FAMILY MEDICINE | Facility: CLINIC | Age: 84
End: 2019-03-20
Payer: MEDICARE

## 2019-03-20 PROCEDURE — 99307 PR NURSING FAC CARE, SUBSEQ, IMPROVING: ICD-10-PCS | Mod: ,,, | Performed by: FAMILY MEDICINE

## 2019-03-20 PROCEDURE — 99307 SBSQ NF CARE SF MDM 10: CPT | Mod: ,,, | Performed by: FAMILY MEDICINE

## 2019-03-27 ENCOUNTER — OUTSIDE PLACE OF SERVICE (OUTPATIENT)
Dept: FAMILY MEDICINE | Facility: CLINIC | Age: 84
End: 2019-03-27
Payer: MEDICARE

## 2019-03-27 PROCEDURE — 99307 SBSQ NF CARE SF MDM 10: CPT | Mod: ,,, | Performed by: FAMILY MEDICINE

## 2019-03-27 PROCEDURE — 99307 PR NURSING FAC CARE, SUBSEQ, IMPROVING: ICD-10-PCS | Mod: ,,, | Performed by: FAMILY MEDICINE

## 2019-03-28 ENCOUNTER — OFFICE VISIT (OUTPATIENT)
Dept: CARDIOLOGY | Facility: CLINIC | Age: 84
End: 2019-03-28
Payer: MEDICARE

## 2019-03-28 VITALS
SYSTOLIC BLOOD PRESSURE: 116 MMHG | WEIGHT: 153 LBS | HEART RATE: 74 BPM | OXYGEN SATURATION: 98 % | BODY MASS INDEX: 22.59 KG/M2 | DIASTOLIC BLOOD PRESSURE: 70 MMHG

## 2019-03-28 DIAGNOSIS — N30.01 ACUTE CYSTITIS WITH HEMATURIA: ICD-10-CM

## 2019-03-28 DIAGNOSIS — I50.9 CONGESTIVE HEART FAILURE, UNSPECIFIED HF CHRONICITY, UNSPECIFIED HEART FAILURE TYPE: ICD-10-CM

## 2019-03-28 DIAGNOSIS — E78.5 HYPERLIPIDEMIA, UNSPECIFIED HYPERLIPIDEMIA TYPE: ICD-10-CM

## 2019-03-28 DIAGNOSIS — I48.91 ATRIAL FIBRILLATION, CONTROLLED: ICD-10-CM

## 2019-03-28 DIAGNOSIS — R65.21 SEPTIC SHOCK: ICD-10-CM

## 2019-03-28 DIAGNOSIS — I50.20 SYSTOLIC HEART FAILURE, UNSPECIFIED HF CHRONICITY: Primary | ICD-10-CM

## 2019-03-28 DIAGNOSIS — A41.9 SEPTIC SHOCK: ICD-10-CM

## 2019-03-28 DIAGNOSIS — Z95.2 S/P AVR (AORTIC VALVE REPLACEMENT): ICD-10-CM

## 2019-03-28 DIAGNOSIS — I26.99 OTHER ACUTE PULMONARY EMBOLISM WITHOUT ACUTE COR PULMONALE: ICD-10-CM

## 2019-03-28 DIAGNOSIS — I65.23 BILATERAL CAROTID ARTERY STENOSIS: ICD-10-CM

## 2019-03-28 DIAGNOSIS — I10 ESSENTIAL HYPERTENSION: ICD-10-CM

## 2019-03-28 DIAGNOSIS — Z95.0 PACEMAKER: ICD-10-CM

## 2019-03-28 DIAGNOSIS — G20.A1 PARKINSON DISEASE: ICD-10-CM

## 2019-03-28 PROCEDURE — 99214 OFFICE O/P EST MOD 30 MIN: CPT | Mod: S$GLB,,, | Performed by: INTERNAL MEDICINE

## 2019-03-28 PROCEDURE — 3078F DIAST BP <80 MM HG: CPT | Mod: S$GLB,,, | Performed by: INTERNAL MEDICINE

## 2019-03-28 PROCEDURE — 99999 PR PBB SHADOW E&M-EST. PATIENT-LVL III: CPT | Mod: PBBFAC,,, | Performed by: INTERNAL MEDICINE

## 2019-03-28 PROCEDURE — 3074F SYST BP LT 130 MM HG: CPT | Mod: S$GLB,,, | Performed by: INTERNAL MEDICINE

## 2019-03-28 PROCEDURE — 3074F PR MOST RECENT SYSTOLIC BLOOD PRESSURE < 130 MM HG: ICD-10-PCS | Mod: S$GLB,,, | Performed by: INTERNAL MEDICINE

## 2019-03-28 PROCEDURE — 99999 PR PBB SHADOW E&M-EST. PATIENT-LVL III: ICD-10-PCS | Mod: PBBFAC,,, | Performed by: INTERNAL MEDICINE

## 2019-03-28 PROCEDURE — 99214 PR OFFICE/OUTPT VISIT, EST, LEVL IV, 30-39 MIN: ICD-10-PCS | Mod: S$GLB,,, | Performed by: INTERNAL MEDICINE

## 2019-03-28 PROCEDURE — 3078F PR MOST RECENT DIASTOLIC BLOOD PRESSURE < 80 MM HG: ICD-10-PCS | Mod: S$GLB,,, | Performed by: INTERNAL MEDICINE

## 2019-04-02 NOTE — PROGRESS NOTES
Subjective:    Patient ID:  Vipul Green is a 85 y.o. male who presents for follow-up of Valvular Heart Disease      HPI     86 y/o male [facility] resident with hx of PPM (CHB), bioprosthetic AVR, HFpEF, carotid artery stenosis, HTN, HLD, PD who presents for f/u. Formerly under the care of Dr Mar. 2DE (EF 50%) and carotid artery reports from 2017 available and reviewed. Had PPM check 1/2019 with normal function VVIR mode. Denies CP, SOB, orthopnea, PND, syncope, palps, LE edema. Has bilateral leg weakness with hx of falls and mostly in wchr. Meds given by NH, does not smoke. Had recent hospital admission for UTI septic shock, now recovered.     Review of Systems   Constitution: Positive for malaise/fatigue.   HENT: Negative for congestion.    Eyes: Negative for blurred vision.   Cardiovascular: Negative for chest pain, claudication, cyanosis, dyspnea on exertion, irregular heartbeat, leg swelling, near-syncope, orthopnea, palpitations, paroxysmal nocturnal dyspnea and syncope.   Respiratory: Negative for shortness of breath.    Endocrine: Negative for polyuria.   Hematologic/Lymphatic: Negative for bleeding problem.   Skin: Negative for itching and rash.   Musculoskeletal: Negative for joint swelling, muscle cramps and muscle weakness.   Gastrointestinal: Negative for abdominal pain, hematemesis, hematochezia, melena, nausea and vomiting.   Genitourinary: Negative for dysuria and hematuria.   Neurological: Negative for dizziness, focal weakness, headaches, light-headedness, loss of balance and weakness.   Psychiatric/Behavioral: Negative for depression. The patient is not nervous/anxious.         Objective:    Physical Exam   Constitutional: He is oriented to person, place, and time. He appears well-developed and well-nourished.   HENT:   Head: Normocephalic and atraumatic.   Neck: Neck supple. No JVD present.   Cardiovascular: Normal rate and regular rhythm.   Murmur heard.   Systolic murmur is present with a grade  of 2/6.  Pulses:       Carotid pulses are 2+ on the right side, and 2+ on the left side.       Radial pulses are 2+ on the right side, and 2+ on the left side.        Femoral pulses are 2+ on the right side, and 2+ on the left side.       Posterior tibial pulses are 1+ on the right side, and 1+ on the left side.   Pulmonary/Chest: Effort normal and breath sounds normal.   Abdominal: Soft. Bowel sounds are normal.   Musculoskeletal: He exhibits no edema.   Neurological: He is alert and oriented to person, place, and time.   Skin: Skin is warm and dry.   Psychiatric: He has a normal mood and affect. His behavior is normal. Thought content normal.         Assessment:       1. Systolic heart failure, unspecified HF chronicity    2. S/P AVR (aortic valve replacement)    3. Pacemaker    4. Congestive heart failure, unspecified HF chronicity, unspecified heart failure type    5. Atrial fibrillation, controlled    6. Bilateral carotid artery stenosis    7. Essential hypertension    8. Hyperlipidemia, unspecified hyperlipidemia type    9. Parkinson disease    10. Acute cystitis with hematuria    11. Septic shock    12. Other acute pulmonary embolism without acute cor pulmonale      86 y/o pt with hx and presentation as above. Doing well from a cardiac perspective and compensated from a HF perspective. On appropriate medical management for CHF and PPM interrogation WNL. Discussed the etiology, evaluation, and management of CHF. Discussed the importance of med compliance, heart healthy diet, and regular exercise. Updated 2DE.        Plan:       -2DE with CFD  -f/u in 6 months with PPM interrogation before

## 2019-04-03 ENCOUNTER — HOSPITAL ENCOUNTER (OUTPATIENT)
Dept: CARDIOLOGY | Facility: HOSPITAL | Age: 84
Discharge: HOME | End: 2019-04-03
Attending: INTERNAL MEDICINE
Payer: MEDICARE

## 2019-04-03 ENCOUNTER — OUTSIDE PLACE OF SERVICE (OUTPATIENT)
Dept: FAMILY MEDICINE | Facility: CLINIC | Age: 84
End: 2019-04-03
Payer: MEDICARE

## 2019-04-03 DIAGNOSIS — I50.20 SYSTOLIC HEART FAILURE, UNSPECIFIED HF CHRONICITY: ICD-10-CM

## 2019-04-03 PROCEDURE — 99499 UNLISTED E&M SERVICE: CPT | Mod: S$GLB,,, | Performed by: FAMILY MEDICINE

## 2019-04-03 PROCEDURE — 93306 TTE W/DOPPLER COMPLETE: CPT | Mod: 26,,, | Performed by: INTERNAL MEDICINE

## 2019-04-03 PROCEDURE — 99499 NO LOS: ICD-10-PCS | Mod: S$GLB,,, | Performed by: FAMILY MEDICINE

## 2019-04-03 PROCEDURE — 93306 TTE W/DOPPLER COMPLETE: CPT | Mod: PO

## 2019-04-03 PROCEDURE — 93306 TRANSTHORACIC ECHO (TTE) COMPLETE (CUPID ONLY): ICD-10-PCS | Mod: 26,,, | Performed by: INTERNAL MEDICINE

## 2019-04-05 LAB
AORTIC ROOT ANNULUS: 2.93 CM
AORTIC VALVE CUSP SEPERATION: 1.11 CM
AV INDEX (PROSTH): 0.36
AV MEAN GRADIENT: 29.06 MMHG
AV PEAK GRADIENT: 48.72 MMHG
AV VALVE AREA: 1.37 CM2
AV VELOCITY RATIO: 0.35
CV ECHO LV RWT: 0.64 CM
DOP CALC AO PEAK VEL: 3.49 M/S
DOP CALC AO VTI: 75.89 CM
DOP CALC LVOT AREA: 3.8 CM2
DOP CALC LVOT DIAMETER: 2.2 CM
DOP CALC LVOT PEAK VEL: 1.21 M/S
DOP CALC LVOT STROKE VOLUME: 103.72 CM3
DOP CALCLVOT PEAK VEL VTI: 27.3 CM
E WAVE DECELERATION TIME: 219.73 MSEC
E/A RATIO: 2.98
ECHO LV POSTERIOR WALL: 1.28 CM (ref 0.6–1.1)
FRACTIONAL SHORTENING: 32 % (ref 28–44)
INTERVENTRICULAR SEPTUM: 1.25 CM (ref 0.6–1.1)
LA MAJOR: 6.14 CM
LA MINOR: 7.25 CM
LEFT ATRIUM SIZE: 4 CM
LEFT INTERNAL DIMENSION IN SYSTOLE: 2.7 CM (ref 2.1–4)
LEFT VENTRICLE DIASTOLIC VOLUME: 69.17 ML
LEFT VENTRICLE SYSTOLIC VOLUME: 27.07 ML
LEFT VENTRICULAR INTERNAL DIMENSION IN DIASTOLE: 3.98 CM (ref 3.5–6)
LEFT VENTRICULAR MASS: 177.69 G
MV PEAK A VEL: 0.42 M/S
MV PEAK E VEL: 1.25 M/S
PISA TR MAX VEL: 2.18 M/S
PV PEAK VELOCITY: 0.72 CM/S
RA MAJOR: 5.76 CM
RA WIDTH: 5.3 CM
RIGHT VENTRICULAR END-DIASTOLIC DIMENSION: 3.47 CM
TR MAX PG: 19.01 MMHG
TRICUSPID ANNULAR PLANE SYSTOLIC EXCURSION: 1.05 CM

## 2019-04-09 ENCOUNTER — TELEPHONE (OUTPATIENT)
Dept: CARDIOLOGY | Facility: CLINIC | Age: 84
End: 2019-04-09

## 2019-04-09 NOTE — TELEPHONE ENCOUNTER
"----- Message from Ollie Retana MD sent at 4/8/2019 11:41 AM CDT -----  Your echocardiogram shows normal heart and valve function. It also shows "diastolic dysfunction" which is elevated pressure in the heart due to a history of high blood pressure and stiffening of the heart muscle. Treatment is blood pressure control and low salt diet.   It also shows that your replacement valve has moderate stenosis (tightness). No change in management  "

## 2019-07-31 ENCOUNTER — CLINICAL SUPPORT (OUTPATIENT)
Dept: CARDIOLOGY | Facility: CLINIC | Age: 84
End: 2019-07-31
Payer: MEDICARE

## 2019-07-31 DIAGNOSIS — Z95.0 PACEMAKER: Primary | ICD-10-CM

## 2019-07-31 PROCEDURE — 93288 INTERROG EVL PM/LDLS PM IP: CPT | Mod: 26,,, | Performed by: INTERNAL MEDICINE

## 2019-07-31 PROCEDURE — 93288 PR INTERROG EVAL, IN PERSON,PACEMAKER: ICD-10-PCS | Mod: 26,,, | Performed by: INTERNAL MEDICINE

## 2019-08-27 NOTE — PROGRESS NOTES
Pacemaker interrogation 7/31/2019          Primary MD: Richie Floyd  Primary Cardiologist: Ollie Retana   Implanting MD:    and serial number: [employer]; Lotus [id number], [id number]  Implant date: 5/10/2011     Reason for evaluation:routine  Indication for pacemaker: CHB     Measurements  Atrial sensing - N/A  Ventricular sensing - R wave: N/A CHB  Ventricular capture: RV Maintained: 037.5 V @ 0.4 ms   Ventricular lead impedance: RV: 841ohms  Underlying rhythm: V paced       Diagnostic Data  Atrial paced: N/A Ventricular paced: 99.9 %  Mode switch: none   Sensor response: not available  Battery status: satisfactory Magnet rate: 99.4 bpm   Estimated battery longevity:  2.5 years     Final Parameters  Mode: VVIR Lower rate: 60 bpm Upper rate: 130 bpm  Ventricular - Amplitude: 2.0 V Pulse width: 0.4 ms Sensitivity: 4.0 mV         Changes made: none        Follow up: 6 months        Cole Chowdhury

## 2020-01-16 ENCOUNTER — TELEPHONE (OUTPATIENT)
Dept: CARDIOLOGY | Facility: CLINIC | Age: 85
End: 2020-01-16

## 2020-01-16 NOTE — TELEPHONE ENCOUNTER
Spoke to daughter, Marlena - patient had a remote monitor a few years back but they no longer have the monitor.  Will get patient on the remote schedule again.

## 2020-01-16 NOTE — TELEPHONE ENCOUNTER
Reached out to the patient's nurse at the [facility].  Patient needs in office device check.  She will have someone call to schedule the appointment.  Also patient does not have a remote monitor in his room.  Will reach out to patient's daughter Marlena to see if he has one at home.

## 2020-01-29 ENCOUNTER — CLINICAL SUPPORT (OUTPATIENT)
Dept: CARDIOLOGY | Facility: CLINIC | Age: 85
End: 2020-01-29
Payer: MEDICARE

## 2020-01-29 DIAGNOSIS — Z95.0 PACEMAKER: Primary | ICD-10-CM

## 2020-01-29 PROCEDURE — 93288 INTERROG EVL PM/LDLS PM IP: CPT | Mod: 26,,, | Performed by: INTERNAL MEDICINE

## 2020-01-29 PROCEDURE — 93288 PR INTERROG EVAL, IN PERSON,PACEMAKER: ICD-10-PCS | Mod: 26,,, | Performed by: INTERNAL MEDICINE

## 2020-01-30 ENCOUNTER — TELEPHONE (OUTPATIENT)
Dept: CARDIOLOGY | Facility: CLINIC | Age: 85
End: 2020-01-30

## 2020-01-30 NOTE — TELEPHONE ENCOUNTER
Patient had his device interrogated yesterday in [place].  Per Rebeca, he does not have a remote monitor.  Will order monitor and have it sent to nursing home.

## 2020-01-31 ENCOUNTER — TELEPHONE (OUTPATIENT)
Dept: CARDIOLOGY | Facility: CLINIC | Age: 85
End: 2020-01-31

## 2020-01-31 NOTE — TELEPHONE ENCOUNTER
[employer] in office device interrogation done in [facility] on 01/29/2020.  Remote monitor ordered and to be shipped to [facility].  Patient due for yearly in office device checks.  Next device check due January 2021.

## 2020-02-05 ENCOUNTER — OUTSIDE PLACE OF SERVICE (OUTPATIENT)
Dept: FAMILY MEDICINE | Facility: CLINIC | Age: 85
End: 2020-02-05
Payer: MEDICARE

## 2020-02-05 PROCEDURE — 99307 SBSQ NF CARE SF MDM 10: CPT | Mod: ,,, | Performed by: FAMILY MEDICINE

## 2020-02-05 PROCEDURE — 99307 PR NURSING FAC CARE, SUBSEQ, IMPROVING: ICD-10-PCS | Mod: ,,, | Performed by: FAMILY MEDICINE

## 2020-02-26 ENCOUNTER — OUTSIDE PLACE OF SERVICE (OUTPATIENT)
Dept: FAMILY MEDICINE | Facility: CLINIC | Age: 85
End: 2020-02-26
Payer: MEDICARE

## 2020-02-26 PROCEDURE — 99307 SBSQ NF CARE SF MDM 10: CPT | Mod: ,,, | Performed by: FAMILY MEDICINE

## 2020-02-26 PROCEDURE — 99307 PR NURSING FAC CARE, SUBSEQ, IMPROVING: ICD-10-PCS | Mod: ,,, | Performed by: FAMILY MEDICINE

## 2020-03-12 NOTE — PROGRESS NOTES
Pacemaker interrogation     1/29/2020        Primary MD: Richie Floyd  Primary Cardiologist: Ollie Retana   Implanting MD:    and serial number: [employer]; Lotus [id number], [id number]  Implant date: 5/10/2011     Reason for evaluation:routine  Indication for pacemaker: CHB     Measurements  Atrial sensing - N/A  Ventricular sensing - R wave: N/A CHB  Ventricular capture: RV Maintained: 0.375 V @ 0.4 ms   Ventricular lead impedance: RV: 875 ohms  Underlying rhythm: V paced       Diagnostic Data  Atrial paced: N/A Ventricular paced: 99.9 %  Mode switch: none   Sensor response: not available  Battery status: satisfactory Magnet rate: 99.4 bpm   Estimated battery longevity:  2.0 years     Final Parameters  Mode: VVIR Lower rate: 60 bpm Upper rate: 130 bpm  Ventricular - Amplitude: 2.0 V Pulse width: 0.4 ms Sensitivity: 4.0 mV         Changes made: none        Follow up: 6 months        Cole Chowdhury

## 2020-03-25 ENCOUNTER — OUTSIDE PLACE OF SERVICE (OUTPATIENT)
Dept: FAMILY MEDICINE | Facility: CLINIC | Age: 85
End: 2020-03-25
Payer: MEDICARE

## 2020-03-25 PROCEDURE — 99499 UNLISTED E&M SERVICE: CPT | Mod: ,,, | Performed by: FAMILY MEDICINE

## 2020-03-25 PROCEDURE — 99499 NO LOS: ICD-10-PCS | Mod: ,,, | Performed by: FAMILY MEDICINE

## 2020-03-26 ENCOUNTER — TELEPHONE (OUTPATIENT)
Dept: CARDIOLOGY | Facility: CLINIC | Age: 85
End: 2020-03-26

## 2020-03-26 NOTE — TELEPHONE ENCOUNTER
Reached out to [facility] in regards to rescheduling pt clinical carlos at the [facility] office location due to COVID-19 concerns     Spoke to Hugh coffman/ transportation services at pt's nursing home in regards to cancelling pt carlos, will reach out to reschedule once office is open

## 2020-04-22 ENCOUNTER — OUTSIDE PLACE OF SERVICE (OUTPATIENT)
Dept: FAMILY MEDICINE | Facility: CLINIC | Age: 85
End: 2020-04-22

## 2020-04-22 PROCEDURE — 99308 SBSQ NF CARE LOW MDM 20: CPT | Mod: ,,, | Performed by: FAMILY MEDICINE

## 2020-04-22 PROCEDURE — 99308 PR NURSING FAC CARE, SUBSEQ, MINOR COMPLIC: ICD-10-PCS | Mod: ,,, | Performed by: FAMILY MEDICINE

## 2020-05-21 ENCOUNTER — OUTSIDE PLACE OF SERVICE (OUTPATIENT)
Dept: FAMILY MEDICINE | Facility: CLINIC | Age: 85
End: 2020-05-21

## 2020-05-21 PROCEDURE — 99308 PR NURSING FAC CARE, SUBSEQ, MINOR COMPLIC: ICD-10-PCS | Mod: ,,, | Performed by: FAMILY MEDICINE

## 2020-05-21 PROCEDURE — 99308 SBSQ NF CARE LOW MDM 20: CPT | Mod: ,,, | Performed by: FAMILY MEDICINE

## 2020-05-27 ENCOUNTER — OUTSIDE PLACE OF SERVICE (OUTPATIENT)
Dept: FAMILY MEDICINE | Facility: CLINIC | Age: 85
End: 2020-05-27

## 2020-05-27 PROCEDURE — 99307 SBSQ NF CARE SF MDM 10: CPT | Mod: ,,, | Performed by: FAMILY MEDICINE

## 2020-05-27 PROCEDURE — 99307 PR NURSING FAC CARE, SUBSEQ, IMPROVING: ICD-10-PCS | Mod: ,,, | Performed by: FAMILY MEDICINE

## 2020-08-04 ENCOUNTER — TELEPHONE (OUTPATIENT)
Dept: CARDIOLOGY | Facility: CLINIC | Age: 85
End: 2020-08-04

## 2020-08-04 NOTE — TELEPHONE ENCOUNTER
Reached out to pt daughter, Marlena, in regards to message     Pt was due for an annual f/u, due to COVID-19 concerns, nursing home is not allowing residents to leave the nursing home under any circumstances unless its an emergency     Advised pt daughter that as long as pt is doing okay, no cardiac symptoms or issues, we can hold off on an carlos until nursing home allows residents to leave for office visits     Pt is being remotely followed by pacemaker as well

## 2020-08-04 NOTE — TELEPHONE ENCOUNTER
----- Message from Nazia Lin sent at 8/4/2020  9:06 AM CDT -----  Contact: Marlena Rhodes (daughter)  Type:  Needs Medical Advice    Who Called:  Marlena  Symptoms (please be specific):  received recall notif in mail for annual   How long has patient had these symptoms:   n/a   Pharmacy name and phone #:  n/a   Would the patient rather a call back or a response via [facility]?  Call back   Best Call Back Number:  [phone]  Additional Information:  pt is in nursing home and unable to leave facility for appts, would like to know what to do. Can the annual be put off for so long.

## 2020-10-14 ENCOUNTER — OUTSIDE PLACE OF SERVICE (OUTPATIENT)
Dept: FAMILY MEDICINE | Facility: CLINIC | Age: 85
End: 2020-10-14
Payer: MEDICARE

## 2020-10-14 PROCEDURE — 99307 PR NURSING FAC CARE, SUBSEQ, IMPROVING: ICD-10-PCS | Mod: ,,, | Performed by: FAMILY MEDICINE

## 2020-10-14 PROCEDURE — 99307 SBSQ NF CARE SF MDM 10: CPT | Mod: ,,, | Performed by: FAMILY MEDICINE

## 2020-12-17 ENCOUNTER — OUTSIDE PLACE OF SERVICE (OUTPATIENT)
Dept: FAMILY MEDICINE | Facility: CLINIC | Age: 85
End: 2020-12-17
Payer: MEDICARE

## 2020-12-17 PROCEDURE — 99308 SBSQ NF CARE LOW MDM 20: CPT | Mod: ,,, | Performed by: FAMILY MEDICINE

## 2020-12-17 PROCEDURE — 99308 PR NURSING FAC CARE, SUBSEQ, MINOR COMPLIC: ICD-10-PCS | Mod: ,,, | Performed by: FAMILY MEDICINE

## 2021-01-20 ENCOUNTER — CLINICAL SUPPORT (OUTPATIENT)
Dept: CARDIOLOGY | Facility: CLINIC | Age: 86
End: 2021-01-20
Payer: MEDICARE

## 2021-01-20 DIAGNOSIS — Z95.0 PACEMAKER: Primary | ICD-10-CM

## 2021-01-20 PROCEDURE — 93279 PR PROGRAM EVAL (IN PERSON) IMPLANT DEVICE,PACEMAKER,1 LEAD: ICD-10-PCS | Mod: 26,,, | Performed by: INTERNAL MEDICINE

## 2021-01-20 PROCEDURE — 93279 PRGRMG DEV EVAL PM/LDLS PM: CPT | Mod: 26,,, | Performed by: INTERNAL MEDICINE

## 2021-02-25 ENCOUNTER — OUTSIDE PLACE OF SERVICE (OUTPATIENT)
Dept: FAMILY MEDICINE | Facility: CLINIC | Age: 86
End: 2021-02-25
Payer: MEDICARE

## 2021-02-25 PROCEDURE — 99307 SBSQ NF CARE SF MDM 10: CPT | Mod: ,,, | Performed by: FAMILY MEDICINE

## 2021-02-25 PROCEDURE — 99307 PR NURSING FAC CARE, SUBSEQ, IMPROVING: ICD-10-PCS | Mod: ,,, | Performed by: FAMILY MEDICINE

## 2021-03-02 ENCOUNTER — HOSPITAL ENCOUNTER (INPATIENT)
Facility: HOSPITAL | Age: 86
LOS: 4 days | Discharge: HOME | DRG: 690 | End: 2021-03-06
Attending: EMERGENCY MEDICINE | Admitting: STUDENT IN AN ORGANIZED HEALTH CARE EDUCATION/TRAINING PROGRAM
Payer: MEDICARE

## 2021-03-02 DIAGNOSIS — G20.A1 PARKINSON DISEASE: ICD-10-CM

## 2021-03-02 DIAGNOSIS — R31.0 GROSS HEMATURIA: Primary | ICD-10-CM

## 2021-03-02 DIAGNOSIS — Z95.0 PACEMAKER: ICD-10-CM

## 2021-03-02 DIAGNOSIS — N18.31 STAGE 3A CHRONIC KIDNEY DISEASE: ICD-10-CM

## 2021-03-02 DIAGNOSIS — D68.9 HEMORRHAGE SECONDARY TO ANTI-COAGULATION: ICD-10-CM

## 2021-03-02 DIAGNOSIS — N30.01 ACUTE CYSTITIS WITH HEMATURIA: ICD-10-CM

## 2021-03-02 DIAGNOSIS — I48.20 CHRONIC ATRIAL FIBRILLATION: ICD-10-CM

## 2021-03-02 DIAGNOSIS — N18.30 STAGE 3 CHRONIC KIDNEY DISEASE, UNSPECIFIED WHETHER STAGE 3A OR 3B CKD: ICD-10-CM

## 2021-03-02 DIAGNOSIS — I50.32 CHRONIC DIASTOLIC CONGESTIVE HEART FAILURE: ICD-10-CM

## 2021-03-02 DIAGNOSIS — T45.7X1A HEMORRHAGE SECONDARY TO ANTI-COAGULATION: ICD-10-CM

## 2021-03-02 DIAGNOSIS — R07.9 CHEST PAIN: ICD-10-CM

## 2021-03-02 DIAGNOSIS — R33.9 URINARY RETENTION: ICD-10-CM

## 2021-03-02 DIAGNOSIS — Z95.2 S/P AVR (AORTIC VALVE REPLACEMENT): ICD-10-CM

## 2021-03-02 DIAGNOSIS — D62 ACUTE POST-HEMORRHAGIC ANEMIA: ICD-10-CM

## 2021-03-02 DIAGNOSIS — R53.1 WEAKNESS: ICD-10-CM

## 2021-03-02 LAB
ABO + RH BLD: NORMAL
ALBUMIN SERPL BCP-MCNC: 3.2 G/DL (ref 3.5–5.2)
ALP SERPL-CCNC: 66 U/L (ref 55–135)
ALT SERPL W/O P-5'-P-CCNC: 6 U/L (ref 10–44)
ANION GAP SERPL CALC-SCNC: 9 MMOL/L (ref 8–16)
AST SERPL-CCNC: 18 U/L (ref 10–40)
BACTERIA #/AREA URNS AUTO: ABNORMAL /HPF
BACTERIA #/AREA URNS AUTO: ABNORMAL /HPF
BASOPHILS # BLD AUTO: 0.04 K/UL (ref 0–0.2)
BASOPHILS NFR BLD: 0.3 % (ref 0–1.9)
BILIRUB SERPL-MCNC: 0.6 MG/DL (ref 0.1–1)
BILIRUB UR QL STRIP: NEGATIVE
BILIRUB UR QL STRIP: NEGATIVE
BLD GP AB SCN CELLS X3 SERPL QL: NORMAL
BUN SERPL-MCNC: 30 MG/DL (ref 8–23)
BUN SERPL-MCNC: 31 MG/DL (ref 6–30)
CALCIUM SERPL-MCNC: 8.6 MG/DL (ref 8.7–10.5)
CHLORIDE SERPL-SCNC: 98 MMOL/L (ref 95–110)
CHLORIDE SERPL-SCNC: 99 MMOL/L (ref 95–110)
CLARITY UR REFRACT.AUTO: CLEAR
CLARITY UR REFRACT.AUTO: CLEAR
CO2 SERPL-SCNC: 25 MMOL/L (ref 23–29)
COLOR UR AUTO: COLORLESS
COLOR UR AUTO: COLORLESS
CREAT SERPL-MCNC: 1.7 MG/DL (ref 0.5–1.4)
CREAT SERPL-MCNC: 1.7 MG/DL (ref 0.5–1.4)
CTP QC/QA: YES
DIFFERENTIAL METHOD: ABNORMAL
EOSINOPHIL # BLD AUTO: 0 K/UL (ref 0–0.5)
EOSINOPHIL NFR BLD: 0.3 % (ref 0–8)
ERYTHROCYTE [DISTWIDTH] IN BLOOD BY AUTOMATED COUNT: 13.3 % (ref 11.5–14.5)
EST. GFR  (AFRICAN AMERICAN): 41 ML/MIN/1.73 M^2
EST. GFR  (NON AFRICAN AMERICAN): 35.5 ML/MIN/1.73 M^2
GLUCOSE SERPL-MCNC: 114 MG/DL (ref 70–110)
GLUCOSE SERPL-MCNC: 115 MG/DL (ref 70–110)
GLUCOSE UR QL STRIP: NEGATIVE
GLUCOSE UR QL STRIP: NEGATIVE
HCT VFR BLD AUTO: 28.2 % (ref 40–54)
HCT VFR BLD AUTO: 30.7 % (ref 40–54)
HCT VFR BLD CALC: 29 %PCV (ref 36–54)
HGB BLD-MCNC: 10.3 G/DL (ref 14–18)
HGB BLD-MCNC: 9.4 G/DL (ref 14–18)
HGB UR QL STRIP: ABNORMAL
HGB UR QL STRIP: ABNORMAL
IMM GRANULOCYTES # BLD AUTO: 0.05 K/UL (ref 0–0.04)
IMM GRANULOCYTES NFR BLD AUTO: 0.3 % (ref 0–0.5)
INR PPP: 1 (ref 0.8–1.2)
KETONES UR QL STRIP: NEGATIVE
KETONES UR QL STRIP: NEGATIVE
LACTATE SERPL-SCNC: 1.5 MMOL/L (ref 0.5–2.2)
LEUKOCYTE ESTERASE UR QL STRIP: ABNORMAL
LEUKOCYTE ESTERASE UR QL STRIP: ABNORMAL
LYMPHOCYTES # BLD AUTO: 3.5 K/UL (ref 1–4.8)
LYMPHOCYTES NFR BLD: 21.9 % (ref 18–48)
MCH RBC QN AUTO: 30.5 PG (ref 27–31)
MCHC RBC AUTO-ENTMCNC: 33.6 G/DL (ref 32–36)
MCV RBC AUTO: 91 FL (ref 82–98)
MICROSCOPIC COMMENT: ABNORMAL
MICROSCOPIC COMMENT: ABNORMAL
MONOCYTES # BLD AUTO: 1.5 K/UL (ref 0.3–1)
MONOCYTES NFR BLD: 9.3 % (ref 4–15)
NEUTROPHILS # BLD AUTO: 10.8 K/UL (ref 1.8–7.7)
NEUTROPHILS NFR BLD: 67.9 % (ref 38–73)
NITRITE UR QL STRIP: NEGATIVE
NITRITE UR QL STRIP: NEGATIVE
NRBC BLD-RTO: 0 /100 WBC
PH UR STRIP: 5 [PH] (ref 5–8)
PH UR STRIP: 6 [PH] (ref 5–8)
PLATELET # BLD AUTO: 243 K/UL (ref 150–350)
PMV BLD AUTO: 10.2 FL (ref 9.2–12.9)
POC IONIZED CALCIUM: 1.1 MMOL/L (ref 1.06–1.42)
POC TCO2 (MEASURED): 27 MMOL/L (ref 23–29)
POTASSIUM BLD-SCNC: 4.3 MMOL/L (ref 3.5–5.1)
POTASSIUM SERPL-SCNC: 4.4 MMOL/L (ref 3.5–5.1)
PROT SERPL-MCNC: 7.5 G/DL (ref 6–8.4)
PROT UR QL STRIP: NEGATIVE
PROT UR QL STRIP: NEGATIVE
PROTHROMBIN TIME: 10.9 SEC (ref 9–12.5)
RBC # BLD AUTO: 3.38 M/UL (ref 4.6–6.2)
RBC #/AREA URNS AUTO: >100 /HPF (ref 0–4)
RBC #/AREA URNS AUTO: >100 /HPF (ref 0–4)
SAMPLE: ABNORMAL
SARS-COV-2 RDRP RESP QL NAA+PROBE: NEGATIVE
SODIUM BLD-SCNC: 134 MMOL/L (ref 136–145)
SODIUM SERPL-SCNC: 133 MMOL/L (ref 136–145)
SP GR UR STRIP: 1 (ref 1–1.03)
SP GR UR STRIP: 1 (ref 1–1.03)
SQUAMOUS #/AREA URNS AUTO: 0 /HPF
URN SPEC COLLECT METH UR: ABNORMAL
URN SPEC COLLECT METH UR: ABNORMAL
WBC # BLD AUTO: 15.84 K/UL (ref 3.9–12.7)
WBC #/AREA URNS AUTO: 42 /HPF (ref 0–5)
WBC #/AREA URNS AUTO: 9 /HPF (ref 0–5)

## 2021-03-02 PROCEDURE — 80053 COMPREHEN METABOLIC PANEL: CPT

## 2021-03-02 PROCEDURE — 85025 COMPLETE CBC W/AUTO DIFF WBC: CPT

## 2021-03-02 PROCEDURE — 51702 INSERT TEMP BLADDER CATH: CPT

## 2021-03-02 PROCEDURE — 25000003 PHARM REV CODE 250: Performed by: STUDENT IN AN ORGANIZED HEALTH CARE EDUCATION/TRAINING PROGRAM

## 2021-03-02 PROCEDURE — 85018 HEMOGLOBIN: CPT

## 2021-03-02 PROCEDURE — 93010 ELECTROCARDIOGRAM REPORT: CPT | Mod: ,,, | Performed by: INTERNAL MEDICINE

## 2021-03-02 PROCEDURE — 81001 URINALYSIS AUTO W/SCOPE: CPT | Mod: 91

## 2021-03-02 PROCEDURE — 85014 HEMATOCRIT: CPT

## 2021-03-02 PROCEDURE — 11000001 HC ACUTE MED/SURG PRIVATE ROOM

## 2021-03-02 PROCEDURE — 86900 BLOOD TYPING SEROLOGIC ABO: CPT

## 2021-03-02 PROCEDURE — 63600175 PHARM REV CODE 636 W HCPCS: Performed by: EMERGENCY MEDICINE

## 2021-03-02 PROCEDURE — 87086 URINE CULTURE/COLONY COUNT: CPT

## 2021-03-02 PROCEDURE — 93005 ELECTROCARDIOGRAM TRACING: CPT

## 2021-03-02 PROCEDURE — U0002 COVID-19 LAB TEST NON-CDC: HCPCS | Performed by: EMERGENCY MEDICINE

## 2021-03-02 PROCEDURE — 83605 ASSAY OF LACTIC ACID: CPT

## 2021-03-02 PROCEDURE — 99291 CRITICAL CARE FIRST HOUR: CPT | Mod: ,,, | Performed by: EMERGENCY MEDICINE

## 2021-03-02 PROCEDURE — 99291 PR CRITICAL CARE, E/M 30-74 MINUTES: ICD-10-PCS | Mod: ,,, | Performed by: EMERGENCY MEDICINE

## 2021-03-02 PROCEDURE — 93010 EKG 12-LEAD: ICD-10-PCS | Mod: ,,, | Performed by: INTERNAL MEDICINE

## 2021-03-02 PROCEDURE — 99291 CRITICAL CARE FIRST HOUR: CPT | Mod: 25

## 2021-03-02 PROCEDURE — 96374 THER/PROPH/DIAG INJ IV PUSH: CPT

## 2021-03-02 PROCEDURE — 96361 HYDRATE IV INFUSION ADD-ON: CPT

## 2021-03-02 PROCEDURE — 25000003 PHARM REV CODE 250: Performed by: EMERGENCY MEDICINE

## 2021-03-02 PROCEDURE — 81001 URINALYSIS AUTO W/SCOPE: CPT

## 2021-03-02 PROCEDURE — 87040 BLOOD CULTURE FOR BACTERIA: CPT | Mod: 59

## 2021-03-02 PROCEDURE — 85610 PROTHROMBIN TIME: CPT

## 2021-03-02 PROCEDURE — 80047 BASIC METABLC PNL IONIZED CA: CPT

## 2021-03-02 RX ORDER — TAMSULOSIN HYDROCHLORIDE 0.4 MG/1
0.4 CAPSULE ORAL DAILY
Status: DISCONTINUED | OUTPATIENT
Start: 2021-03-03 | End: 2021-03-06 | Stop reason: HOSPADM

## 2021-03-02 RX ORDER — IBUPROFEN 200 MG
24 TABLET ORAL
Status: DISCONTINUED | OUTPATIENT
Start: 2021-03-02 | End: 2021-03-06 | Stop reason: HOSPADM

## 2021-03-02 RX ORDER — ACETAMINOPHEN 325 MG/1
650 TABLET ORAL EVERY 8 HOURS PRN
Status: DISCONTINUED | OUTPATIENT
Start: 2021-03-02 | End: 2021-03-06 | Stop reason: HOSPADM

## 2021-03-02 RX ORDER — IBUPROFEN 200 MG
16 TABLET ORAL
Status: DISCONTINUED | OUTPATIENT
Start: 2021-03-02 | End: 2021-03-06 | Stop reason: HOSPADM

## 2021-03-02 RX ORDER — LIDOCAINE HYDROCHLORIDE 20 MG/ML
JELLY TOPICAL
Status: COMPLETED | OUTPATIENT
Start: 2021-03-02 | End: 2021-03-02

## 2021-03-02 RX ORDER — CEFTRIAXONE 1 G/1
1 INJECTION, POWDER, FOR SOLUTION INTRAMUSCULAR; INTRAVENOUS
Status: COMPLETED | OUTPATIENT
Start: 2021-03-03 | End: 2021-03-04

## 2021-03-02 RX ORDER — PANTOPRAZOLE SODIUM 40 MG/1
40 TABLET, DELAYED RELEASE ORAL DAILY
Status: DISCONTINUED | OUTPATIENT
Start: 2021-03-03 | End: 2021-03-06 | Stop reason: HOSPADM

## 2021-03-02 RX ORDER — ACETAMINOPHEN 325 MG/1
650 TABLET ORAL ONCE
Status: COMPLETED | OUTPATIENT
Start: 2021-03-02 | End: 2021-03-02

## 2021-03-02 RX ORDER — GLUCAGON 1 MG
1 KIT INJECTION
Status: DISCONTINUED | OUTPATIENT
Start: 2021-03-02 | End: 2021-03-06 | Stop reason: HOSPADM

## 2021-03-02 RX ORDER — DOCUSATE SODIUM 100 MG/1
100 CAPSULE, LIQUID FILLED ORAL DAILY
Status: DISCONTINUED | OUTPATIENT
Start: 2021-03-03 | End: 2021-03-06 | Stop reason: HOSPADM

## 2021-03-02 RX ORDER — CEFTRIAXONE 1 G/1
1 INJECTION, POWDER, FOR SOLUTION INTRAMUSCULAR; INTRAVENOUS
Status: COMPLETED | OUTPATIENT
Start: 2021-03-02 | End: 2021-03-02

## 2021-03-02 RX ORDER — SODIUM CHLORIDE 0.9 % (FLUSH) 0.9 %
10 SYRINGE (ML) INJECTION
Status: DISCONTINUED | OUTPATIENT
Start: 2021-03-02 | End: 2021-03-06 | Stop reason: HOSPADM

## 2021-03-02 RX ORDER — DIGOXIN 125 MCG
125 TABLET ORAL DAILY
Status: DISCONTINUED | OUTPATIENT
Start: 2021-03-03 | End: 2021-03-06 | Stop reason: HOSPADM

## 2021-03-02 RX ORDER — CARBIDOPA AND LEVODOPA 25; 100 MG/1; MG/1
1 TABLET ORAL 3 TIMES DAILY
Status: DISCONTINUED | OUTPATIENT
Start: 2021-03-03 | End: 2021-03-06 | Stop reason: HOSPADM

## 2021-03-02 RX ADMIN — LIDOCAINE HYDROCHLORIDE 10 ML: 20 JELLY TOPICAL at 02:03

## 2021-03-02 RX ADMIN — ACETAMINOPHEN 650 MG: 325 TABLET ORAL at 02:03

## 2021-03-02 RX ADMIN — CEFTRIAXONE 1 G: 1 INJECTION, POWDER, FOR SOLUTION INTRAMUSCULAR; INTRAVENOUS at 08:03

## 2021-03-02 RX ADMIN — SODIUM CHLORIDE 500 ML: 0.9 INJECTION, SOLUTION INTRAVENOUS at 08:03

## 2021-03-02 RX ADMIN — SODIUM CHLORIDE 500 ML: 0.9 INJECTION, SOLUTION INTRAVENOUS at 06:03

## 2021-03-02 RX ADMIN — SODIUM CHLORIDE 500 ML: 0.9 INJECTION, SOLUTION INTRAVENOUS at 09:03

## 2021-03-03 PROBLEM — N39.0 UTI (URINARY TRACT INFECTION): Status: RESOLVED | Noted: 2019-02-22 | Resolved: 2021-03-03

## 2021-03-03 PROBLEM — N30.91 HEMORRHAGIC CYSTITIS: Status: ACTIVE | Noted: 2021-03-02

## 2021-03-03 PROBLEM — N18.30 CHRONIC KIDNEY DISEASE, STAGE III (MODERATE): Status: ACTIVE | Noted: 2021-03-03

## 2021-03-03 PROBLEM — I25.10 CORONARY ARTERY DISEASE: Status: ACTIVE | Noted: 2021-03-03

## 2021-03-03 PROBLEM — R31.9 HEMATURIA: Status: ACTIVE | Noted: 2021-03-03

## 2021-03-03 PROBLEM — D64.9 NORMOCYTIC ANEMIA: Status: ACTIVE | Noted: 2021-03-03

## 2021-03-03 PROBLEM — N30.01 ACUTE CYSTITIS WITH HEMATURIA: Status: ACTIVE | Noted: 2021-03-02

## 2021-03-03 LAB
ALBUMIN SERPL BCP-MCNC: 2.8 G/DL (ref 3.5–5.2)
ALP SERPL-CCNC: 62 U/L (ref 55–135)
ALT SERPL W/O P-5'-P-CCNC: 9 U/L (ref 10–44)
ANION GAP SERPL CALC-SCNC: 9 MMOL/L (ref 8–16)
AST SERPL-CCNC: 17 U/L (ref 10–40)
BACTERIA UR CULT: NO GROWTH
BASOPHILS # BLD AUTO: 0.03 K/UL (ref 0–0.2)
BASOPHILS # BLD AUTO: 0.03 K/UL (ref 0–0.2)
BASOPHILS NFR BLD: 0.2 % (ref 0–1.9)
BASOPHILS NFR BLD: 0.2 % (ref 0–1.9)
BILIRUB SERPL-MCNC: 0.5 MG/DL (ref 0.1–1)
BUN SERPL-MCNC: 28 MG/DL (ref 8–23)
CALCIUM SERPL-MCNC: 8.2 MG/DL (ref 8.7–10.5)
CHLORIDE SERPL-SCNC: 105 MMOL/L (ref 95–110)
CO2 SERPL-SCNC: 25 MMOL/L (ref 23–29)
CREAT SERPL-MCNC: 1.4 MG/DL (ref 0.5–1.4)
DIFFERENTIAL METHOD: ABNORMAL
DIFFERENTIAL METHOD: ABNORMAL
DIGOXIN SERPL-MCNC: 1.1 NG/ML (ref 0.8–2)
EOSINOPHIL # BLD AUTO: 0 K/UL (ref 0–0.5)
EOSINOPHIL # BLD AUTO: 0 K/UL (ref 0–0.5)
EOSINOPHIL NFR BLD: 0.2 % (ref 0–8)
EOSINOPHIL NFR BLD: 0.2 % (ref 0–8)
ERYTHROCYTE [DISTWIDTH] IN BLOOD BY AUTOMATED COUNT: 13.7 % (ref 11.5–14.5)
ERYTHROCYTE [DISTWIDTH] IN BLOOD BY AUTOMATED COUNT: 13.7 % (ref 11.5–14.5)
EST. GFR  (AFRICAN AMERICAN): 51.9 ML/MIN/1.73 M^2
EST. GFR  (NON AFRICAN AMERICAN): 44.9 ML/MIN/1.73 M^2
FERRITIN SERPL-MCNC: 162 NG/ML (ref 20–300)
GLUCOSE SERPL-MCNC: 95 MG/DL (ref 70–110)
HCT VFR BLD AUTO: 25.9 % (ref 40–54)
HCT VFR BLD AUTO: 26.1 % (ref 40–54)
HGB BLD-MCNC: 8.5 G/DL (ref 14–18)
HGB BLD-MCNC: 8.6 G/DL (ref 14–18)
IMM GRANULOCYTES # BLD AUTO: 0.05 K/UL (ref 0–0.04)
IMM GRANULOCYTES # BLD AUTO: 0.07 K/UL (ref 0–0.04)
IMM GRANULOCYTES NFR BLD AUTO: 0.4 % (ref 0–0.5)
IMM GRANULOCYTES NFR BLD AUTO: 0.5 % (ref 0–0.5)
IRON SERPL-MCNC: 36 UG/DL (ref 45–160)
LYMPHOCYTES # BLD AUTO: 2.3 K/UL (ref 1–4.8)
LYMPHOCYTES # BLD AUTO: 2.9 K/UL (ref 1–4.8)
LYMPHOCYTES NFR BLD: 17.2 % (ref 18–48)
LYMPHOCYTES NFR BLD: 21.4 % (ref 18–48)
MAGNESIUM SERPL-MCNC: 1.9 MG/DL (ref 1.6–2.6)
MCH RBC QN AUTO: 30.4 PG (ref 27–31)
MCH RBC QN AUTO: 30.7 PG (ref 27–31)
MCHC RBC AUTO-ENTMCNC: 32.8 G/DL (ref 32–36)
MCHC RBC AUTO-ENTMCNC: 33 G/DL (ref 32–36)
MCV RBC AUTO: 92 FL (ref 82–98)
MCV RBC AUTO: 94 FL (ref 82–98)
MONOCYTES # BLD AUTO: 1.1 K/UL (ref 0.3–1)
MONOCYTES # BLD AUTO: 1.1 K/UL (ref 0.3–1)
MONOCYTES NFR BLD: 8 % (ref 4–15)
MONOCYTES NFR BLD: 8.3 % (ref 4–15)
NEUTROPHILS # BLD AUTO: 9.4 K/UL (ref 1.8–7.7)
NEUTROPHILS # BLD AUTO: 9.7 K/UL (ref 1.8–7.7)
NEUTROPHILS NFR BLD: 69.4 % (ref 38–73)
NEUTROPHILS NFR BLD: 74 % (ref 38–73)
NRBC BLD-RTO: 0 /100 WBC
NRBC BLD-RTO: 0 /100 WBC
PHOSPHATE SERPL-MCNC: 3.9 MG/DL (ref 2.7–4.5)
PLATELET # BLD AUTO: 178 K/UL (ref 150–350)
PLATELET # BLD AUTO: 198 K/UL (ref 150–350)
PMV BLD AUTO: 10.2 FL (ref 9.2–12.9)
PMV BLD AUTO: 10.3 FL (ref 9.2–12.9)
POTASSIUM SERPL-SCNC: 4.4 MMOL/L (ref 3.5–5.1)
PROT SERPL-MCNC: 6.6 G/DL (ref 6–8.4)
RBC # BLD AUTO: 2.77 M/UL (ref 4.6–6.2)
RBC # BLD AUTO: 2.83 M/UL (ref 4.6–6.2)
SATURATED IRON: 17 % (ref 20–50)
SODIUM SERPL-SCNC: 139 MMOL/L (ref 136–145)
TOTAL IRON BINDING CAPACITY: 210 UG/DL (ref 250–450)
TRANSFERRIN SERPL-MCNC: 142 MG/DL (ref 200–375)
WBC # BLD AUTO: 13.17 K/UL (ref 3.9–12.7)
WBC # BLD AUTO: 13.52 K/UL (ref 3.9–12.7)

## 2021-03-03 PROCEDURE — 63600175 PHARM REV CODE 636 W HCPCS: Performed by: STUDENT IN AN ORGANIZED HEALTH CARE EDUCATION/TRAINING PROGRAM

## 2021-03-03 PROCEDURE — 25000003 PHARM REV CODE 250: Performed by: STUDENT IN AN ORGANIZED HEALTH CARE EDUCATION/TRAINING PROGRAM

## 2021-03-03 PROCEDURE — 80162 ASSAY OF DIGOXIN TOTAL: CPT | Performed by: STUDENT IN AN ORGANIZED HEALTH CARE EDUCATION/TRAINING PROGRAM

## 2021-03-03 PROCEDURE — 80053 COMPREHEN METABOLIC PANEL: CPT

## 2021-03-03 PROCEDURE — 85025 COMPLETE CBC W/AUTO DIFF WBC: CPT

## 2021-03-03 PROCEDURE — 11000001 HC ACUTE MED/SURG PRIVATE ROOM

## 2021-03-03 PROCEDURE — 83540 ASSAY OF IRON: CPT

## 2021-03-03 PROCEDURE — 99223 PR INITIAL HOSPITAL CARE,LEVL III: ICD-10-PCS | Mod: ,,, | Performed by: STUDENT IN AN ORGANIZED HEALTH CARE EDUCATION/TRAINING PROGRAM

## 2021-03-03 PROCEDURE — 99222 PR INITIAL HOSPITAL CARE,LEVL II: ICD-10-PCS | Mod: ,,, | Performed by: UROLOGY

## 2021-03-03 PROCEDURE — 99222 1ST HOSP IP/OBS MODERATE 55: CPT | Mod: ,,, | Performed by: UROLOGY

## 2021-03-03 PROCEDURE — 83735 ASSAY OF MAGNESIUM: CPT

## 2021-03-03 PROCEDURE — 84100 ASSAY OF PHOSPHORUS: CPT

## 2021-03-03 PROCEDURE — 97165 OT EVAL LOW COMPLEX 30 MIN: CPT

## 2021-03-03 PROCEDURE — 85025 COMPLETE CBC W/AUTO DIFF WBC: CPT | Mod: 91 | Performed by: STUDENT IN AN ORGANIZED HEALTH CARE EDUCATION/TRAINING PROGRAM

## 2021-03-03 PROCEDURE — 97161 PT EVAL LOW COMPLEX 20 MIN: CPT

## 2021-03-03 PROCEDURE — 36415 COLL VENOUS BLD VENIPUNCTURE: CPT

## 2021-03-03 PROCEDURE — 82728 ASSAY OF FERRITIN: CPT

## 2021-03-03 PROCEDURE — 97535 SELF CARE MNGMENT TRAINING: CPT

## 2021-03-03 PROCEDURE — 99223 1ST HOSP IP/OBS HIGH 75: CPT | Mod: ,,, | Performed by: STUDENT IN AN ORGANIZED HEALTH CARE EDUCATION/TRAINING PROGRAM

## 2021-03-03 PROCEDURE — 97530 THERAPEUTIC ACTIVITIES: CPT

## 2021-03-03 RX ORDER — ONDANSETRON 4 MG/1
4 TABLET, FILM COATED ORAL EVERY 6 HOURS PRN
Status: ON HOLD | COMMUNITY
End: 2023-01-01

## 2021-03-03 RX ORDER — CYCLOBENZAPRINE HCL 5 MG
5 TABLET ORAL 3 TIMES DAILY PRN
COMMUNITY
End: 2023-01-01

## 2021-03-03 RX ORDER — MUPIROCIN 20 MG/G
OINTMENT TOPICAL 2 TIMES DAILY
Status: DISCONTINUED | OUTPATIENT
Start: 2021-03-03 | End: 2021-03-06 | Stop reason: HOSPADM

## 2021-03-03 RX ORDER — DULOXETIN HYDROCHLORIDE 30 MG/1
30 CAPSULE, DELAYED RELEASE ORAL DAILY
Status: ON HOLD | COMMUNITY
End: 2023-01-01

## 2021-03-03 RX ORDER — DICLOFENAC SODIUM 10 MG/G
GEL TOPICAL
Status: ON HOLD | COMMUNITY
End: 2023-01-01

## 2021-03-03 RX ORDER — TALC
6 POWDER (GRAM) TOPICAL ONCE
Status: COMPLETED | OUTPATIENT
Start: 2021-03-03 | End: 2021-03-03

## 2021-03-03 RX ORDER — MIRTAZAPINE 15 MG/1
15 TABLET, FILM COATED ORAL NIGHTLY
Status: ON HOLD | COMMUNITY
End: 2023-01-01

## 2021-03-03 RX ORDER — ACETAMINOPHEN 500 MG
500 TABLET ORAL EVERY 6 HOURS PRN
Status: ON HOLD | COMMUNITY
End: 2023-01-01

## 2021-03-03 RX ORDER — TRAMADOL HYDROCHLORIDE 50 MG/1
50 TABLET ORAL 3 TIMES DAILY
Status: ON HOLD | COMMUNITY
End: 2023-01-01

## 2021-03-03 RX ADMIN — CEFTRIAXONE 1 G: 1 INJECTION, POWDER, FOR SOLUTION INTRAMUSCULAR; INTRAVENOUS at 10:03

## 2021-03-03 RX ADMIN — Medication 6 MG: at 08:03

## 2021-03-03 RX ADMIN — DIGOXIN 125 MCG: 125 TABLET ORAL at 08:03

## 2021-03-03 RX ADMIN — DOCUSATE SODIUM 100 MG: 100 CAPSULE ORAL at 08:03

## 2021-03-03 RX ADMIN — CARBIDOPA AND LEVODOPA 1 TABLET: 25; 100 TABLET ORAL at 02:03

## 2021-03-03 RX ADMIN — CARBIDOPA AND LEVODOPA 1 TABLET: 25; 100 TABLET ORAL at 10:03

## 2021-03-03 RX ADMIN — CARBIDOPA AND LEVODOPA 1 TABLET: 25; 100 TABLET ORAL at 08:03

## 2021-03-03 RX ADMIN — TAMSULOSIN HYDROCHLORIDE 0.4 MG: 0.4 CAPSULE ORAL at 08:03

## 2021-03-03 RX ADMIN — PANTOPRAZOLE SODIUM 40 MG: 40 TABLET, DELAYED RELEASE ORAL at 08:03

## 2021-03-03 RX ADMIN — MUPIROCIN: 20 OINTMENT TOPICAL at 10:03

## 2021-03-04 PROBLEM — R33.9 URINARY RETENTION: Status: ACTIVE | Noted: 2021-03-04

## 2021-03-04 PROBLEM — D62 ACUTE POST-HEMORRHAGIC ANEMIA: Status: ACTIVE | Noted: 2021-03-04

## 2021-03-04 PROBLEM — R65.21 SEPTIC SHOCK: Status: RESOLVED | Noted: 2019-02-22 | Resolved: 2021-03-04

## 2021-03-04 PROBLEM — A41.9 SEPTIC SHOCK: Status: RESOLVED | Noted: 2019-02-22 | Resolved: 2021-03-04

## 2021-03-04 LAB
ALBUMIN SERPL BCP-MCNC: 2.7 G/DL (ref 3.5–5.2)
ALP SERPL-CCNC: 58 U/L (ref 55–135)
ALT SERPL W/O P-5'-P-CCNC: <5 U/L (ref 10–44)
ANION GAP SERPL CALC-SCNC: 10 MMOL/L (ref 8–16)
AST SERPL-CCNC: 15 U/L (ref 10–40)
BACTERIA UR CULT: NO GROWTH
BASOPHILS # BLD AUTO: 0.02 K/UL (ref 0–0.2)
BASOPHILS # BLD AUTO: 0.03 K/UL (ref 0–0.2)
BASOPHILS NFR BLD: 0.2 % (ref 0–1.9)
BASOPHILS NFR BLD: 0.3 % (ref 0–1.9)
BILIRUB SERPL-MCNC: 0.3 MG/DL (ref 0.1–1)
BUN SERPL-MCNC: 26 MG/DL (ref 8–23)
CALCIUM SERPL-MCNC: 8 MG/DL (ref 8.7–10.5)
CHLORIDE SERPL-SCNC: 106 MMOL/L (ref 95–110)
CO2 SERPL-SCNC: 22 MMOL/L (ref 23–29)
CREAT SERPL-MCNC: 1.3 MG/DL (ref 0.5–1.4)
DIFFERENTIAL METHOD: ABNORMAL
DIFFERENTIAL METHOD: ABNORMAL
EOSINOPHIL # BLD AUTO: 0 K/UL (ref 0–0.5)
EOSINOPHIL # BLD AUTO: 0.1 K/UL (ref 0–0.5)
EOSINOPHIL NFR BLD: 0.4 % (ref 0–8)
EOSINOPHIL NFR BLD: 0.8 % (ref 0–8)
ERYTHROCYTE [DISTWIDTH] IN BLOOD BY AUTOMATED COUNT: 13.7 % (ref 11.5–14.5)
ERYTHROCYTE [DISTWIDTH] IN BLOOD BY AUTOMATED COUNT: 13.7 % (ref 11.5–14.5)
EST. GFR  (AFRICAN AMERICAN): 56.7 ML/MIN/1.73 M^2
EST. GFR  (NON AFRICAN AMERICAN): 49.1 ML/MIN/1.73 M^2
GLUCOSE SERPL-MCNC: 100 MG/DL (ref 70–110)
HCT VFR BLD AUTO: 25.5 % (ref 40–54)
HCT VFR BLD AUTO: 27 % (ref 40–54)
HGB BLD-MCNC: 8.3 G/DL (ref 14–18)
HGB BLD-MCNC: 8.6 G/DL (ref 14–18)
IMM GRANULOCYTES # BLD AUTO: 0.03 K/UL (ref 0–0.04)
IMM GRANULOCYTES # BLD AUTO: 0.05 K/UL (ref 0–0.04)
IMM GRANULOCYTES NFR BLD AUTO: 0.4 % (ref 0–0.5)
IMM GRANULOCYTES NFR BLD AUTO: 0.4 % (ref 0–0.5)
LYMPHOCYTES # BLD AUTO: 2 K/UL (ref 1–4.8)
LYMPHOCYTES # BLD AUTO: 3.5 K/UL (ref 1–4.8)
LYMPHOCYTES NFR BLD: 23.7 % (ref 18–48)
LYMPHOCYTES NFR BLD: 30.9 % (ref 18–48)
MAGNESIUM SERPL-MCNC: 2.1 MG/DL (ref 1.6–2.6)
MCH RBC QN AUTO: 29.6 PG (ref 27–31)
MCH RBC QN AUTO: 30.3 PG (ref 27–31)
MCHC RBC AUTO-ENTMCNC: 31.9 G/DL (ref 32–36)
MCHC RBC AUTO-ENTMCNC: 32.5 G/DL (ref 32–36)
MCV RBC AUTO: 93 FL (ref 82–98)
MCV RBC AUTO: 93 FL (ref 82–98)
MONOCYTES # BLD AUTO: 0.7 K/UL (ref 0.3–1)
MONOCYTES # BLD AUTO: 1.1 K/UL (ref 0.3–1)
MONOCYTES NFR BLD: 8.6 % (ref 4–15)
MONOCYTES NFR BLD: 9.2 % (ref 4–15)
NEUTROPHILS # BLD AUTO: 5.6 K/UL (ref 1.8–7.7)
NEUTROPHILS # BLD AUTO: 6.7 K/UL (ref 1.8–7.7)
NEUTROPHILS NFR BLD: 58.4 % (ref 38–73)
NEUTROPHILS NFR BLD: 66.7 % (ref 38–73)
NRBC BLD-RTO: 0 /100 WBC
NRBC BLD-RTO: 0 /100 WBC
PHOSPHATE SERPL-MCNC: 3.2 MG/DL (ref 2.7–4.5)
PLATELET # BLD AUTO: 159 K/UL (ref 150–350)
PLATELET # BLD AUTO: 179 K/UL (ref 150–350)
PMV BLD AUTO: 10 FL (ref 9.2–12.9)
PMV BLD AUTO: 10.2 FL (ref 9.2–12.9)
POTASSIUM SERPL-SCNC: 3.7 MMOL/L (ref 3.5–5.1)
PROT SERPL-MCNC: 6.6 G/DL (ref 6–8.4)
RBC # BLD AUTO: 2.74 M/UL (ref 4.6–6.2)
RBC # BLD AUTO: 2.91 M/UL (ref 4.6–6.2)
SODIUM SERPL-SCNC: 138 MMOL/L (ref 136–145)
WBC # BLD AUTO: 11.43 K/UL (ref 3.9–12.7)
WBC # BLD AUTO: 8.39 K/UL (ref 3.9–12.7)

## 2021-03-04 PROCEDURE — 25000003 PHARM REV CODE 250: Performed by: STUDENT IN AN ORGANIZED HEALTH CARE EDUCATION/TRAINING PROGRAM

## 2021-03-04 PROCEDURE — 11000001 HC ACUTE MED/SURG PRIVATE ROOM

## 2021-03-04 PROCEDURE — 80053 COMPREHEN METABOLIC PANEL: CPT | Performed by: STUDENT IN AN ORGANIZED HEALTH CARE EDUCATION/TRAINING PROGRAM

## 2021-03-04 PROCEDURE — 63600175 PHARM REV CODE 636 W HCPCS: Performed by: INTERNAL MEDICINE

## 2021-03-04 PROCEDURE — 36415 COLL VENOUS BLD VENIPUNCTURE: CPT | Performed by: STUDENT IN AN ORGANIZED HEALTH CARE EDUCATION/TRAINING PROGRAM

## 2021-03-04 PROCEDURE — 83735 ASSAY OF MAGNESIUM: CPT | Performed by: STUDENT IN AN ORGANIZED HEALTH CARE EDUCATION/TRAINING PROGRAM

## 2021-03-04 PROCEDURE — 84100 ASSAY OF PHOSPHORUS: CPT | Performed by: STUDENT IN AN ORGANIZED HEALTH CARE EDUCATION/TRAINING PROGRAM

## 2021-03-04 PROCEDURE — 99233 SBSQ HOSP IP/OBS HIGH 50: CPT | Mod: GC,,, | Performed by: INTERNAL MEDICINE

## 2021-03-04 PROCEDURE — 99233 PR SUBSEQUENT HOSPITAL CARE,LEVL III: ICD-10-PCS | Mod: GC,,, | Performed by: INTERNAL MEDICINE

## 2021-03-04 PROCEDURE — 85025 COMPLETE CBC W/AUTO DIFF WBC: CPT | Performed by: STUDENT IN AN ORGANIZED HEALTH CARE EDUCATION/TRAINING PROGRAM

## 2021-03-04 RX ORDER — TALC
6 POWDER (GRAM) TOPICAL NIGHTLY PRN
Status: DISCONTINUED | OUTPATIENT
Start: 2021-03-04 | End: 2021-03-06 | Stop reason: HOSPADM

## 2021-03-04 RX ORDER — LEVOFLOXACIN 500 MG/1
500 TABLET, FILM COATED ORAL DAILY
Status: DISCONTINUED | OUTPATIENT
Start: 2021-03-05 | End: 2021-03-04

## 2021-03-04 RX ORDER — CEFDINIR 300 MG/1
300 CAPSULE ORAL EVERY 12 HOURS
Status: DISCONTINUED | OUTPATIENT
Start: 2021-03-05 | End: 2021-03-04

## 2021-03-04 RX ORDER — CARVEDILOL 6.25 MG/1
6.25 TABLET ORAL 2 TIMES DAILY
Status: DISCONTINUED | OUTPATIENT
Start: 2021-03-04 | End: 2021-03-06 | Stop reason: HOSPADM

## 2021-03-04 RX ORDER — CEFDINIR 250 MG/5ML
300 POWDER, FOR SUSPENSION ORAL EVERY 12 HOURS
Status: DISCONTINUED | OUTPATIENT
Start: 2021-03-05 | End: 2021-03-06 | Stop reason: HOSPADM

## 2021-03-04 RX ADMIN — CARBIDOPA AND LEVODOPA 1 TABLET: 25; 100 TABLET ORAL at 02:03

## 2021-03-04 RX ADMIN — CARBIDOPA AND LEVODOPA 1 TABLET: 25; 100 TABLET ORAL at 09:03

## 2021-03-04 RX ADMIN — TAMSULOSIN HYDROCHLORIDE 0.4 MG: 0.4 CAPSULE ORAL at 09:03

## 2021-03-04 RX ADMIN — MELATONIN TAB 3 MG 6 MG: 3 TAB at 10:03

## 2021-03-04 RX ADMIN — CEFTRIAXONE 1 G: 1 INJECTION, POWDER, FOR SOLUTION INTRAMUSCULAR; INTRAVENOUS at 09:03

## 2021-03-04 RX ADMIN — PANTOPRAZOLE SODIUM 40 MG: 40 TABLET, DELAYED RELEASE ORAL at 09:03

## 2021-03-04 RX ADMIN — DOCUSATE SODIUM 100 MG: 100 CAPSULE ORAL at 09:03

## 2021-03-04 RX ADMIN — DIGOXIN 125 MCG: 125 TABLET ORAL at 09:03

## 2021-03-04 RX ADMIN — MUPIROCIN: 20 OINTMENT TOPICAL at 09:03

## 2021-03-05 LAB
ALBUMIN SERPL BCP-MCNC: 2.7 G/DL (ref 3.5–5.2)
ALP SERPL-CCNC: 60 U/L (ref 55–135)
ALT SERPL W/O P-5'-P-CCNC: <5 U/L (ref 10–44)
ANION GAP SERPL CALC-SCNC: 8 MMOL/L (ref 8–16)
AST SERPL-CCNC: 16 U/L (ref 10–40)
BASOPHILS # BLD AUTO: 0.02 K/UL (ref 0–0.2)
BASOPHILS NFR BLD: 0.2 % (ref 0–1.9)
BILIRUB SERPL-MCNC: 0.2 MG/DL (ref 0.1–1)
BUN SERPL-MCNC: 28 MG/DL (ref 8–23)
CALCIUM SERPL-MCNC: 8 MG/DL (ref 8.7–10.5)
CHLORIDE SERPL-SCNC: 107 MMOL/L (ref 95–110)
CO2 SERPL-SCNC: 23 MMOL/L (ref 23–29)
CREAT SERPL-MCNC: 1.2 MG/DL (ref 0.5–1.4)
DIFFERENTIAL METHOD: ABNORMAL
EOSINOPHIL # BLD AUTO: 0.1 K/UL (ref 0–0.5)
EOSINOPHIL NFR BLD: 0.7 % (ref 0–8)
ERYTHROCYTE [DISTWIDTH] IN BLOOD BY AUTOMATED COUNT: 13.6 % (ref 11.5–14.5)
EST. GFR  (AFRICAN AMERICAN): >60 ML/MIN/1.73 M^2
EST. GFR  (NON AFRICAN AMERICAN): 54 ML/MIN/1.73 M^2
GLUCOSE SERPL-MCNC: 113 MG/DL (ref 70–110)
HCT VFR BLD AUTO: 25.4 % (ref 40–54)
HGB BLD-MCNC: 8.2 G/DL (ref 14–18)
IMM GRANULOCYTES # BLD AUTO: 0.06 K/UL (ref 0–0.04)
IMM GRANULOCYTES NFR BLD AUTO: 0.7 % (ref 0–0.5)
LYMPHOCYTES # BLD AUTO: 2.4 K/UL (ref 1–4.8)
LYMPHOCYTES NFR BLD: 26 % (ref 18–48)
MAGNESIUM SERPL-MCNC: 2.2 MG/DL (ref 1.6–2.6)
MCH RBC QN AUTO: 30.1 PG (ref 27–31)
MCHC RBC AUTO-ENTMCNC: 32.3 G/DL (ref 32–36)
MCV RBC AUTO: 93 FL (ref 82–98)
MONOCYTES # BLD AUTO: 0.7 K/UL (ref 0.3–1)
MONOCYTES NFR BLD: 7.7 % (ref 4–15)
NEUTROPHILS # BLD AUTO: 6 K/UL (ref 1.8–7.7)
NEUTROPHILS NFR BLD: 64.7 % (ref 38–73)
NRBC BLD-RTO: 0 /100 WBC
PHOSPHATE SERPL-MCNC: 2.5 MG/DL (ref 2.7–4.5)
PLATELET # BLD AUTO: 165 K/UL (ref 150–350)
PMV BLD AUTO: 10 FL (ref 9.2–12.9)
POTASSIUM SERPL-SCNC: 3.4 MMOL/L (ref 3.5–5.1)
PROT SERPL-MCNC: 6.6 G/DL (ref 6–8.4)
RBC # BLD AUTO: 2.72 M/UL (ref 4.6–6.2)
SODIUM SERPL-SCNC: 138 MMOL/L (ref 136–145)
WBC # BLD AUTO: 9.2 K/UL (ref 3.9–12.7)

## 2021-03-05 PROCEDURE — 99232 PR SUBSEQUENT HOSPITAL CARE,LEVL II: ICD-10-PCS | Mod: GC,,, | Performed by: INTERNAL MEDICINE

## 2021-03-05 PROCEDURE — 11000001 HC ACUTE MED/SURG PRIVATE ROOM

## 2021-03-05 PROCEDURE — 84100 ASSAY OF PHOSPHORUS: CPT | Performed by: STUDENT IN AN ORGANIZED HEALTH CARE EDUCATION/TRAINING PROGRAM

## 2021-03-05 PROCEDURE — 85025 COMPLETE CBC W/AUTO DIFF WBC: CPT | Performed by: STUDENT IN AN ORGANIZED HEALTH CARE EDUCATION/TRAINING PROGRAM

## 2021-03-05 PROCEDURE — 25000003 PHARM REV CODE 250: Performed by: STUDENT IN AN ORGANIZED HEALTH CARE EDUCATION/TRAINING PROGRAM

## 2021-03-05 PROCEDURE — 80053 COMPREHEN METABOLIC PANEL: CPT | Performed by: STUDENT IN AN ORGANIZED HEALTH CARE EDUCATION/TRAINING PROGRAM

## 2021-03-05 PROCEDURE — 36415 COLL VENOUS BLD VENIPUNCTURE: CPT | Performed by: STUDENT IN AN ORGANIZED HEALTH CARE EDUCATION/TRAINING PROGRAM

## 2021-03-05 PROCEDURE — 25000003 PHARM REV CODE 250: Performed by: INTERNAL MEDICINE

## 2021-03-05 PROCEDURE — 83735 ASSAY OF MAGNESIUM: CPT | Performed by: STUDENT IN AN ORGANIZED HEALTH CARE EDUCATION/TRAINING PROGRAM

## 2021-03-05 PROCEDURE — 99232 SBSQ HOSP IP/OBS MODERATE 35: CPT | Mod: GC,,, | Performed by: INTERNAL MEDICINE

## 2021-03-05 RX ORDER — SODIUM,POTASSIUM PHOSPHATES 280-250MG
2 POWDER IN PACKET (EA) ORAL
Status: COMPLETED | OUTPATIENT
Start: 2021-03-05 | End: 2021-03-05

## 2021-03-05 RX ORDER — CEFDINIR 250 MG/5ML
300 POWDER, FOR SUSPENSION ORAL EVERY 12 HOURS
Qty: 84 ML | Refills: 0 | Status: CANCELLED | OUTPATIENT
Start: 2021-03-05 | End: 2021-03-12

## 2021-03-05 RX ORDER — TAMSULOSIN HYDROCHLORIDE 0.4 MG/1
0.4 CAPSULE ORAL DAILY
Status: ON HOLD
Start: 2021-03-05 | End: 2023-01-01

## 2021-03-05 RX ORDER — CEFDINIR 250 MG/5ML
300 POWDER, FOR SUSPENSION ORAL EVERY 12 HOURS
Qty: 108 ML | Refills: 0 | Status: SHIPPED | OUTPATIENT
Start: 2021-03-06 | End: 2021-03-15

## 2021-03-05 RX ORDER — CEFDINIR 250 MG/5ML
300 POWDER, FOR SUSPENSION ORAL EVERY 12 HOURS
Qty: 84 ML | Refills: 0 | Status: SHIPPED | OUTPATIENT
Start: 2021-03-05 | End: 2021-03-05

## 2021-03-05 RX ORDER — CEFDINIR 250 MG/5ML
300 POWDER, FOR SUSPENSION ORAL EVERY 12 HOURS
Qty: 72 ML | Refills: 0 | Status: CANCELLED
Start: 2021-03-05 | End: 2021-03-11

## 2021-03-05 RX ADMIN — MUPIROCIN: 20 OINTMENT TOPICAL at 08:03

## 2021-03-05 RX ADMIN — CARBIDOPA AND LEVODOPA 1 TABLET: 25; 100 TABLET ORAL at 02:03

## 2021-03-05 RX ADMIN — MELATONIN TAB 3 MG 6 MG: 3 TAB at 09:03

## 2021-03-05 RX ADMIN — TAMSULOSIN HYDROCHLORIDE 0.4 MG: 0.4 CAPSULE ORAL at 08:03

## 2021-03-05 RX ADMIN — CEFDINIR 300 MG: 250 POWDER, FOR SUSPENSION ORAL at 12:03

## 2021-03-05 RX ADMIN — DIGOXIN 125 MCG: 125 TABLET ORAL at 08:03

## 2021-03-05 RX ADMIN — CARVEDILOL 6.25 MG: 6.25 TABLET, FILM COATED ORAL at 08:03

## 2021-03-05 RX ADMIN — MUPIROCIN: 20 OINTMENT TOPICAL at 09:03

## 2021-03-05 RX ADMIN — POTASSIUM & SODIUM PHOSPHATES POWDER PACK 280-160-250 MG 2 PACKET: 280-160-250 PACK at 12:03

## 2021-03-05 RX ADMIN — CARVEDILOL 6.25 MG: 6.25 TABLET, FILM COATED ORAL at 09:03

## 2021-03-05 RX ADMIN — POTASSIUM & SODIUM PHOSPHATES POWDER PACK 280-160-250 MG 2 PACKET: 280-160-250 PACK at 02:03

## 2021-03-05 RX ADMIN — CARBIDOPA AND LEVODOPA 1 TABLET: 25; 100 TABLET ORAL at 09:03

## 2021-03-05 RX ADMIN — CEFDINIR 300 MG: 250 POWDER, FOR SUSPENSION ORAL at 11:03

## 2021-03-05 RX ADMIN — DOCUSATE SODIUM 100 MG: 100 CAPSULE ORAL at 08:03

## 2021-03-05 RX ADMIN — CARBIDOPA AND LEVODOPA 1 TABLET: 25; 100 TABLET ORAL at 08:03

## 2021-03-05 RX ADMIN — PANTOPRAZOLE SODIUM 40 MG: 40 TABLET, DELAYED RELEASE ORAL at 08:03

## 2021-03-06 VITALS
TEMPERATURE: 98 F | DIASTOLIC BLOOD PRESSURE: 75 MMHG | HEIGHT: 69 IN | WEIGHT: 140.19 LBS | SYSTOLIC BLOOD PRESSURE: 128 MMHG | BODY MASS INDEX: 20.76 KG/M2 | HEART RATE: 60 BPM | OXYGEN SATURATION: 97 % | RESPIRATION RATE: 23 BRPM

## 2021-03-06 LAB
ALBUMIN SERPL BCP-MCNC: 2.6 G/DL (ref 3.5–5.2)
ALP SERPL-CCNC: 60 U/L (ref 55–135)
ALT SERPL W/O P-5'-P-CCNC: 8 U/L (ref 10–44)
ANION GAP SERPL CALC-SCNC: 9 MMOL/L (ref 8–16)
AST SERPL-CCNC: 21 U/L (ref 10–40)
BASOPHILS # BLD AUTO: 0.02 K/UL (ref 0–0.2)
BASOPHILS NFR BLD: 0.2 % (ref 0–1.9)
BILIRUB SERPL-MCNC: 0.2 MG/DL (ref 0.1–1)
BUN SERPL-MCNC: 28 MG/DL (ref 8–23)
CALCIUM SERPL-MCNC: 7.9 MG/DL (ref 8.7–10.5)
CHLORIDE SERPL-SCNC: 109 MMOL/L (ref 95–110)
CO2 SERPL-SCNC: 25 MMOL/L (ref 23–29)
CREAT SERPL-MCNC: 1.1 MG/DL (ref 0.5–1.4)
DIFFERENTIAL METHOD: ABNORMAL
EOSINOPHIL # BLD AUTO: 0.1 K/UL (ref 0–0.5)
EOSINOPHIL NFR BLD: 0.9 % (ref 0–8)
ERYTHROCYTE [DISTWIDTH] IN BLOOD BY AUTOMATED COUNT: 13.7 % (ref 11.5–14.5)
EST. GFR  (AFRICAN AMERICAN): >60 ML/MIN/1.73 M^2
EST. GFR  (NON AFRICAN AMERICAN): >60 ML/MIN/1.73 M^2
GLUCOSE SERPL-MCNC: 113 MG/DL (ref 70–110)
HCT VFR BLD AUTO: 26 % (ref 40–54)
HGB BLD-MCNC: 8.4 G/DL (ref 14–18)
IMM GRANULOCYTES # BLD AUTO: 0.04 K/UL (ref 0–0.04)
IMM GRANULOCYTES NFR BLD AUTO: 0.5 % (ref 0–0.5)
LYMPHOCYTES # BLD AUTO: 2 K/UL (ref 1–4.8)
LYMPHOCYTES NFR BLD: 23.1 % (ref 18–48)
MAGNESIUM SERPL-MCNC: 2.1 MG/DL (ref 1.6–2.6)
MCH RBC QN AUTO: 30.2 PG (ref 27–31)
MCHC RBC AUTO-ENTMCNC: 32.3 G/DL (ref 32–36)
MCV RBC AUTO: 94 FL (ref 82–98)
MONOCYTES # BLD AUTO: 0.8 K/UL (ref 0.3–1)
MONOCYTES NFR BLD: 9.1 % (ref 4–15)
NEUTROPHILS # BLD AUTO: 5.8 K/UL (ref 1.8–7.7)
NEUTROPHILS NFR BLD: 66.2 % (ref 38–73)
NRBC BLD-RTO: 0 /100 WBC
PHOSPHATE SERPL-MCNC: 3 MG/DL (ref 2.7–4.5)
PLATELET # BLD AUTO: 185 K/UL (ref 150–350)
PMV BLD AUTO: 9.7 FL (ref 9.2–12.9)
POTASSIUM SERPL-SCNC: 3.7 MMOL/L (ref 3.5–5.1)
PROT SERPL-MCNC: 6.5 G/DL (ref 6–8.4)
RBC # BLD AUTO: 2.78 M/UL (ref 4.6–6.2)
SODIUM SERPL-SCNC: 143 MMOL/L (ref 136–145)
WBC # BLD AUTO: 8.72 K/UL (ref 3.9–12.7)

## 2021-03-06 PROCEDURE — 99238 HOSP IP/OBS DSCHRG MGMT 30/<: CPT | Mod: GC,,, | Performed by: INTERNAL MEDICINE

## 2021-03-06 PROCEDURE — 83735 ASSAY OF MAGNESIUM: CPT | Performed by: STUDENT IN AN ORGANIZED HEALTH CARE EDUCATION/TRAINING PROGRAM

## 2021-03-06 PROCEDURE — 85025 COMPLETE CBC W/AUTO DIFF WBC: CPT | Performed by: STUDENT IN AN ORGANIZED HEALTH CARE EDUCATION/TRAINING PROGRAM

## 2021-03-06 PROCEDURE — 80053 COMPREHEN METABOLIC PANEL: CPT | Performed by: STUDENT IN AN ORGANIZED HEALTH CARE EDUCATION/TRAINING PROGRAM

## 2021-03-06 PROCEDURE — 99238 PR HOSPITAL DISCHARGE DAY,<30 MIN: ICD-10-PCS | Mod: GC,,, | Performed by: INTERNAL MEDICINE

## 2021-03-06 PROCEDURE — 84100 ASSAY OF PHOSPHORUS: CPT | Performed by: STUDENT IN AN ORGANIZED HEALTH CARE EDUCATION/TRAINING PROGRAM

## 2021-03-06 PROCEDURE — 36415 COLL VENOUS BLD VENIPUNCTURE: CPT | Performed by: STUDENT IN AN ORGANIZED HEALTH CARE EDUCATION/TRAINING PROGRAM

## 2021-03-06 PROCEDURE — 25000003 PHARM REV CODE 250: Performed by: INTERNAL MEDICINE

## 2021-03-06 PROCEDURE — 25000003 PHARM REV CODE 250: Performed by: STUDENT IN AN ORGANIZED HEALTH CARE EDUCATION/TRAINING PROGRAM

## 2021-03-06 RX ORDER — POTASSIUM CHLORIDE 20 MEQ/1
40 TABLET, EXTENDED RELEASE ORAL ONCE
Status: DISCONTINUED | OUTPATIENT
Start: 2021-03-06 | End: 2021-03-06 | Stop reason: HOSPADM

## 2021-03-06 RX ADMIN — PANTOPRAZOLE SODIUM 40 MG: 40 TABLET, DELAYED RELEASE ORAL at 09:03

## 2021-03-06 RX ADMIN — TAMSULOSIN HYDROCHLORIDE 0.4 MG: 0.4 CAPSULE ORAL at 09:03

## 2021-03-06 RX ADMIN — DOCUSATE SODIUM 100 MG: 100 CAPSULE ORAL at 09:03

## 2021-03-06 RX ADMIN — MUPIROCIN: 20 OINTMENT TOPICAL at 09:03

## 2021-03-06 RX ADMIN — CARVEDILOL 6.25 MG: 6.25 TABLET, FILM COATED ORAL at 09:03

## 2021-03-06 RX ADMIN — DIGOXIN 125 MCG: 125 TABLET ORAL at 09:03

## 2021-03-06 RX ADMIN — CARBIDOPA AND LEVODOPA 1 TABLET: 25; 100 TABLET ORAL at 09:03

## 2021-03-08 LAB
BACTERIA BLD CULT: NORMAL
BACTERIA BLD CULT: NORMAL

## 2021-03-10 ENCOUNTER — OUTSIDE PLACE OF SERVICE (OUTPATIENT)
Dept: FAMILY MEDICINE | Facility: CLINIC | Age: 86
End: 2021-03-10
Payer: MEDICARE

## 2021-03-10 PROCEDURE — 99308 PR NURSING FAC CARE, SUBSEQ, MINOR COMPLIC: ICD-10-PCS | Mod: ,,, | Performed by: FAMILY MEDICINE

## 2021-03-10 PROCEDURE — 99308 SBSQ NF CARE LOW MDM 20: CPT | Mod: ,,, | Performed by: FAMILY MEDICINE

## 2021-04-21 DIAGNOSIS — K62.89 CHRONIC IDIOPATHIC ANAL PAIN: Primary | ICD-10-CM

## 2021-04-21 DIAGNOSIS — G89.29 CHRONIC IDIOPATHIC ANAL PAIN: Primary | ICD-10-CM

## 2021-04-23 ENCOUNTER — HOSPITAL ENCOUNTER (OUTPATIENT)
Dept: RADIOLOGY | Facility: HOSPITAL | Age: 86
Discharge: HOME | End: 2021-04-23
Attending: FAMILY MEDICINE
Payer: MEDICARE

## 2021-04-23 DIAGNOSIS — K62.89 CHRONIC IDIOPATHIC ANAL PAIN: ICD-10-CM

## 2021-04-23 DIAGNOSIS — G89.29 CHRONIC IDIOPATHIC ANAL PAIN: ICD-10-CM

## 2021-04-23 PROCEDURE — 72131 CT LUMBAR SPINE W/O DYE: CPT | Mod: TC,PO

## 2021-07-12 ENCOUNTER — TELEPHONE (OUTPATIENT)
Dept: CARDIOLOGY | Facility: CLINIC | Age: 86
End: 2021-07-12

## 2021-07-19 ENCOUNTER — TELEPHONE (OUTPATIENT)
Dept: CARDIOLOGY | Facility: CLINIC | Age: 86
End: 2021-07-19

## 2021-08-17 ENCOUNTER — TELEPHONE (OUTPATIENT)
Dept: CARDIOLOGY | Facility: CLINIC | Age: 86
End: 2021-08-17

## 2021-09-08 ENCOUNTER — OUTSIDE PLACE OF SERVICE (OUTPATIENT)
Dept: FAMILY MEDICINE | Facility: CLINIC | Age: 86
End: 2021-09-08
Payer: MEDICARE

## 2021-09-08 PROCEDURE — 99308 PR NURSING FAC CARE, SUBSEQ, MINOR COMPLIC: ICD-10-PCS | Mod: ,,, | Performed by: FAMILY MEDICINE

## 2021-09-08 PROCEDURE — 99308 SBSQ NF CARE LOW MDM 20: CPT | Mod: ,,, | Performed by: FAMILY MEDICINE

## 2021-09-15 ENCOUNTER — OUTSIDE PLACE OF SERVICE (OUTPATIENT)
Dept: FAMILY MEDICINE | Facility: CLINIC | Age: 86
End: 2021-09-15
Payer: MEDICARE

## 2021-09-15 PROCEDURE — 99307 PR NURSING FAC CARE, SUBSEQ, IMPROVING: ICD-10-PCS | Mod: ,,, | Performed by: FAMILY MEDICINE

## 2021-09-15 PROCEDURE — 99307 SBSQ NF CARE SF MDM 10: CPT | Mod: ,,, | Performed by: FAMILY MEDICINE

## 2021-11-18 ENCOUNTER — OUTSIDE PLACE OF SERVICE (OUTPATIENT)
Dept: FAMILY MEDICINE | Facility: CLINIC | Age: 86
End: 2021-11-18
Payer: MEDICARE

## 2021-11-18 PROCEDURE — 99308 SBSQ NF CARE LOW MDM 20: CPT | Mod: ,,, | Performed by: FAMILY MEDICINE

## 2021-11-18 PROCEDURE — 99308 PR NURSING FAC CARE, SUBSEQ, MINOR COMPLIC: ICD-10-PCS | Mod: ,,, | Performed by: FAMILY MEDICINE

## 2021-12-21 ENCOUNTER — TELEPHONE (OUTPATIENT)
Dept: CARDIOLOGY | Facility: CLINIC | Age: 86
End: 2021-12-21
Payer: MEDICARE

## 2021-12-29 ENCOUNTER — TELEPHONE (OUTPATIENT)
Dept: CARDIOLOGY | Facility: CLINIC | Age: 86
End: 2021-12-29
Payer: MEDICARE

## 2022-01-01 ENCOUNTER — OUTSIDE PLACE OF SERVICE (OUTPATIENT)
Dept: FAMILY MEDICINE | Facility: CLINIC | Age: 87
End: 2022-01-01
Payer: MEDICARE

## 2022-01-01 ENCOUNTER — CLINICAL SUPPORT (OUTPATIENT)
Dept: CARDIOLOGY | Facility: CLINIC | Age: 87
End: 2022-01-01
Payer: MEDICARE

## 2022-01-01 DIAGNOSIS — Z95.0 PACEMAKER: Primary | ICD-10-CM

## 2022-01-01 PROCEDURE — 99499 NO LOS: ICD-10-PCS | Mod: ,,, | Performed by: FAMILY MEDICINE

## 2022-01-01 PROCEDURE — 99499 UNLISTED E&M SERVICE: CPT | Mod: ,,, | Performed by: FAMILY MEDICINE

## 2022-01-01 PROCEDURE — 93288 INTERROG EVL PM/LDLS PM IP: CPT | Mod: 26,,, | Performed by: INTERNAL MEDICINE

## 2022-01-01 PROCEDURE — 93288 PR INTERROG EVAL, IN PERSON,PACEMAKER: ICD-10-PCS | Mod: 26,,, | Performed by: INTERNAL MEDICINE

## 2022-01-01 PROCEDURE — 99307 SBSQ NF CARE SF MDM 10: CPT | Mod: ,,, | Performed by: FAMILY MEDICINE

## 2022-01-01 PROCEDURE — 99307 PR NURSING FAC CARE, SUBSEQ, IMPROVING: ICD-10-PCS | Mod: ,,, | Performed by: FAMILY MEDICINE

## 2022-02-04 NOTE — PROGRESS NOTES
Pacemaker interrogation       1/16/2022      Primary MD: Richie Floyd  Primary Cardiologist: Ollie Retana   Implanting MD:    and serial number: [employer]; Lotus [id number], [id number]  Implant date: 5/10/2011     Reason for evaluation:routine  Indication for pacemaker: CHB     Measurements  Atrial sensing - N/A  Ventricular sensing - R wave: N/A CHB  Ventricular capture: RV Maintained: 0.5 V @ 0.4 ms   Ventricular lead impedance: RV: 882 ohms  Underlying rhythm: V paced       Diagnostic Data  Atrial paced: N/A Ventricular paced: 100 %  Mode switch: none   Sensor response: not available  Battery status: satisfactory Magnet rate: 85 bpm   Estimated battery longevity:  8 months; batter 2.67 V     Final Parameters  Mode: VVIR Lower rate: 60 bpm Upper rate: 130 bpm  Ventricular - Amplitude: 2.0 V Pulse width: 0.4 ms Sensitivity: 4.0 mV         Changes made: none        Follow up: 6 months        Cole Chowdhury

## 2023-01-01 ENCOUNTER — HOSPITAL ENCOUNTER (INPATIENT)
Facility: HOSPITAL | Age: 88
LOS: 1 days | DRG: 951 | End: 2023-01-06
Attending: STUDENT IN AN ORGANIZED HEALTH CARE EDUCATION/TRAINING PROGRAM | Admitting: STUDENT IN AN ORGANIZED HEALTH CARE EDUCATION/TRAINING PROGRAM
Payer: OTHER MISCELLANEOUS

## 2023-01-01 ENCOUNTER — HOSPITAL ENCOUNTER (INPATIENT)
Facility: HOSPITAL | Age: 88
LOS: 2 days | Discharge: HOSPICE | DRG: 309 | End: 2023-01-05
Attending: EMERGENCY MEDICINE | Admitting: EMERGENCY MEDICINE
Payer: MEDICARE

## 2023-01-01 VITALS
DIASTOLIC BLOOD PRESSURE: 58 MMHG | OXYGEN SATURATION: 89 % | TEMPERATURE: 97 F | RESPIRATION RATE: 12 BRPM | HEART RATE: 60 BPM | SYSTOLIC BLOOD PRESSURE: 127 MMHG

## 2023-01-01 VITALS
OXYGEN SATURATION: 98 % | BODY MASS INDEX: 20.73 KG/M2 | HEART RATE: 50 BPM | HEIGHT: 69 IN | RESPIRATION RATE: 12 BRPM | DIASTOLIC BLOOD PRESSURE: 53 MMHG | WEIGHT: 140 LBS | TEMPERATURE: 98 F | SYSTOLIC BLOOD PRESSURE: 113 MMHG

## 2023-01-01 DIAGNOSIS — R00.1 SYMPTOMATIC BRADYCARDIA: ICD-10-CM

## 2023-01-01 DIAGNOSIS — R00.1 BRADYCARDIA: Primary | ICD-10-CM

## 2023-01-01 LAB
ABO + RH BLD: NORMAL
ALBUMIN SERPL BCP-MCNC: 3.2 G/DL (ref 3.5–5.2)
ALLENS TEST: ABNORMAL
ALP SERPL-CCNC: 76 U/L (ref 55–135)
ALT SERPL W/O P-5'-P-CCNC: <5 U/L (ref 10–44)
ANION GAP SERPL CALC-SCNC: 8 MMOL/L (ref 8–16)
AST SERPL-CCNC: 14 U/L (ref 10–40)
BASOPHILS # BLD AUTO: 0.05 K/UL (ref 0–0.2)
BASOPHILS NFR BLD: 0.5 % (ref 0–1.9)
BILIRUB SERPL-MCNC: 0.5 MG/DL (ref 0.1–1)
BLD GP AB SCN CELLS X3 SERPL QL: NORMAL
BNP SERPL-MCNC: 218 PG/ML (ref 0–99)
BUN SERPL-MCNC: 56 MG/DL (ref 8–23)
CALCIUM SERPL-MCNC: 8.7 MG/DL (ref 8.7–10.5)
CHLORIDE SERPL-SCNC: 102 MMOL/L (ref 95–110)
CO2 SERPL-SCNC: 28 MMOL/L (ref 23–29)
CREAT SERPL-MCNC: 2 MG/DL (ref 0.5–1.4)
DIFFERENTIAL METHOD: ABNORMAL
DIGOXIN SERPL-MCNC: 1.1 NG/ML (ref 0.8–2)
EOSINOPHIL # BLD AUTO: 0.2 K/UL (ref 0–0.5)
EOSINOPHIL NFR BLD: 1.7 % (ref 0–8)
ERYTHROCYTE [DISTWIDTH] IN BLOOD BY AUTOMATED COUNT: 14.7 % (ref 11.5–14.5)
EST. GFR  (NO RACE VARIABLE): 31 ML/MIN/1.73 M^2
GLUCOSE SERPL-MCNC: 118 MG/DL (ref 70–110)
HCO3 UR-SCNC: 30.6 MMOL/L (ref 24–28)
HCT VFR BLD AUTO: 37 % (ref 40–54)
HCT VFR BLD CALC: 37 %PCV (ref 36–54)
HGB BLD-MCNC: 11.7 G/DL (ref 14–18)
HGB BLD-MCNC: 13 G/DL
IMM GRANULOCYTES # BLD AUTO: 0.02 K/UL (ref 0–0.04)
IMM GRANULOCYTES NFR BLD AUTO: 0.2 % (ref 0–0.5)
INR PPP: 1.2 (ref 0.8–1.2)
LACTATE SERPL-SCNC: 2.2 MMOL/L (ref 0.5–2.2)
LYMPHOCYTES # BLD AUTO: 2.4 K/UL (ref 1–4.8)
LYMPHOCYTES NFR BLD: 25.2 % (ref 18–48)
MAGNESIUM SERPL-MCNC: 2.7 MG/DL (ref 1.6–2.6)
MCH RBC QN AUTO: 28.4 PG (ref 27–31)
MCHC RBC AUTO-ENTMCNC: 31.6 G/DL (ref 32–36)
MCV RBC AUTO: 90 FL (ref 82–98)
MONOCYTES # BLD AUTO: 0.9 K/UL (ref 0.3–1)
MONOCYTES NFR BLD: 9.4 % (ref 4–15)
NEUTROPHILS # BLD AUTO: 6.1 K/UL (ref 1.8–7.7)
NEUTROPHILS NFR BLD: 63 % (ref 38–73)
NRBC BLD-RTO: 0 /100 WBC
PCO2 BLDA: 51.2 MMHG (ref 35–45)
PH SMN: 7.38 [PH] (ref 7.35–7.45)
PLATELET # BLD AUTO: 228 K/UL (ref 150–450)
PMV BLD AUTO: 10.8 FL (ref 9.2–12.9)
PO2 BLDA: 16 MMHG (ref 40–60)
POC BE: 6 MMOL/L
POC SATURATED O2: 21 % (ref 95–100)
POC TCO2: 32 MMOL/L (ref 24–29)
POCT GLUCOSE: 148 MG/DL (ref 70–110)
POTASSIUM BLD-SCNC: 4.7 MMOL/L (ref 3.5–5.1)
POTASSIUM SERPL-SCNC: 4.8 MMOL/L (ref 3.5–5.1)
PROT SERPL-MCNC: 8.4 G/DL (ref 6–8.4)
PROTHROMBIN TIME: 12.3 SEC (ref 9–12.5)
RBC # BLD AUTO: 4.12 M/UL (ref 4.6–6.2)
SAMPLE: ABNORMAL
SITE: ABNORMAL
SODIUM BLD-SCNC: 140 MMOL/L (ref 136–145)
SODIUM SERPL-SCNC: 138 MMOL/L (ref 136–145)
TROPONIN I SERPL DL<=0.01 NG/ML-MCNC: 0.21 NG/ML (ref 0–0.03)
TSH SERPL DL<=0.005 MIU/L-ACNC: 3.03 UIU/ML (ref 0.4–4)
WBC # BLD AUTO: 9.6 K/UL (ref 3.9–12.7)

## 2023-01-01 PROCEDURE — 99223 PR INITIAL HOSPITAL CARE,LEVL III: ICD-10-PCS | Mod: GW,,, | Performed by: STUDENT IN AN ORGANIZED HEALTH CARE EDUCATION/TRAINING PROGRAM

## 2023-01-01 PROCEDURE — 25000003 PHARM REV CODE 250: Performed by: STUDENT IN AN ORGANIZED HEALTH CARE EDUCATION/TRAINING PROGRAM

## 2023-01-01 PROCEDURE — 11000001 HC ACUTE MED/SURG PRIVATE ROOM

## 2023-01-01 PROCEDURE — 94761 N-INVAS EAR/PLS OXIMETRY MLT: CPT

## 2023-01-01 PROCEDURE — 63600175 PHARM REV CODE 636 W HCPCS: Performed by: NURSE PRACTITIONER

## 2023-01-01 PROCEDURE — 99900035 HC TECH TIME PER 15 MIN (STAT)

## 2023-01-01 PROCEDURE — 27000221 HC OXYGEN, UP TO 24 HOURS

## 2023-01-01 PROCEDURE — 84484 ASSAY OF TROPONIN QUANT: CPT | Performed by: EMERGENCY MEDICINE

## 2023-01-01 PROCEDURE — 25000003 PHARM REV CODE 250: Performed by: EMERGENCY MEDICINE

## 2023-01-01 PROCEDURE — 99232 SBSQ HOSP IP/OBS MODERATE 35: CPT | Mod: GW,,, | Performed by: STUDENT IN AN ORGANIZED HEALTH CARE EDUCATION/TRAINING PROGRAM

## 2023-01-01 PROCEDURE — 99223 1ST HOSP IP/OBS HIGH 75: CPT | Mod: GW,,, | Performed by: STUDENT IN AN ORGANIZED HEALTH CARE EDUCATION/TRAINING PROGRAM

## 2023-01-01 PROCEDURE — 96375 TX/PRO/DX INJ NEW DRUG ADDON: CPT

## 2023-01-01 PROCEDURE — 87040 BLOOD CULTURE FOR BACTERIA: CPT | Performed by: EMERGENCY MEDICINE

## 2023-01-01 PROCEDURE — 99232 PR SUBSEQUENT HOSPITAL CARE,LEVL II: ICD-10-PCS | Mod: GW,,, | Performed by: STUDENT IN AN ORGANIZED HEALTH CARE EDUCATION/TRAINING PROGRAM

## 2023-01-01 PROCEDURE — 86900 BLOOD TYPING SEROLOGIC ABO: CPT | Performed by: EMERGENCY MEDICINE

## 2023-01-01 PROCEDURE — 85610 PROTHROMBIN TIME: CPT | Performed by: EMERGENCY MEDICINE

## 2023-01-01 PROCEDURE — 63600175 PHARM REV CODE 636 W HCPCS: Performed by: STUDENT IN AN ORGANIZED HEALTH CARE EDUCATION/TRAINING PROGRAM

## 2023-01-01 PROCEDURE — 99497 PR ADVNCD CARE PLAN 30 MIN: ICD-10-PCS | Mod: 25,GW,, | Performed by: STUDENT IN AN ORGANIZED HEALTH CARE EDUCATION/TRAINING PROGRAM

## 2023-01-01 PROCEDURE — 99498 ADVNCD CARE PLAN ADDL 30 MIN: CPT | Mod: GW,,, | Performed by: STUDENT IN AN ORGANIZED HEALTH CARE EDUCATION/TRAINING PROGRAM

## 2023-01-01 PROCEDURE — 96366 THER/PROPH/DIAG IV INF ADDON: CPT

## 2023-01-01 PROCEDURE — 93005 ELECTROCARDIOGRAM TRACING: CPT

## 2023-01-01 PROCEDURE — 96376 TX/PRO/DX INJ SAME DRUG ADON: CPT

## 2023-01-01 PROCEDURE — 99498 PR ADVNCD CARE PLAN ADDL 30 MIN: ICD-10-PCS | Mod: GW,,, | Performed by: STUDENT IN AN ORGANIZED HEALTH CARE EDUCATION/TRAINING PROGRAM

## 2023-01-01 PROCEDURE — 83880 ASSAY OF NATRIURETIC PEPTIDE: CPT | Performed by: EMERGENCY MEDICINE

## 2023-01-01 PROCEDURE — 83735 ASSAY OF MAGNESIUM: CPT | Performed by: EMERGENCY MEDICINE

## 2023-01-01 PROCEDURE — 25000003 PHARM REV CODE 250: Performed by: FAMILY MEDICINE

## 2023-01-01 PROCEDURE — 99497 ADVNCD CARE PLAN 30 MIN: CPT | Mod: 25,GW,, | Performed by: STUDENT IN AN ORGANIZED HEALTH CARE EDUCATION/TRAINING PROGRAM

## 2023-01-01 PROCEDURE — 99291 CRITICAL CARE FIRST HOUR: CPT | Mod: 25

## 2023-01-01 PROCEDURE — 93010 ELECTROCARDIOGRAM REPORT: CPT | Mod: 76,,, | Performed by: INTERNAL MEDICINE

## 2023-01-01 PROCEDURE — 12000002 HC ACUTE/MED SURGE SEMI-PRIVATE ROOM

## 2023-01-01 PROCEDURE — 93010 EKG 12-LEAD: ICD-10-PCS | Mod: 76,,, | Performed by: INTERNAL MEDICINE

## 2023-01-01 PROCEDURE — 85025 COMPLETE CBC W/AUTO DIFF WBC: CPT | Performed by: EMERGENCY MEDICINE

## 2023-01-01 PROCEDURE — 82803 BLOOD GASES ANY COMBINATION: CPT

## 2023-01-01 PROCEDURE — 25000003 PHARM REV CODE 250: Performed by: NURSE PRACTITIONER

## 2023-01-01 PROCEDURE — 96365 THER/PROPH/DIAG IV INF INIT: CPT

## 2023-01-01 PROCEDURE — 80053 COMPREHEN METABOLIC PANEL: CPT | Performed by: EMERGENCY MEDICINE

## 2023-01-01 PROCEDURE — 83605 ASSAY OF LACTIC ACID: CPT | Performed by: EMERGENCY MEDICINE

## 2023-01-01 PROCEDURE — 63600175 PHARM REV CODE 636 W HCPCS: Performed by: EMERGENCY MEDICINE

## 2023-01-01 PROCEDURE — 82962 GLUCOSE BLOOD TEST: CPT

## 2023-01-01 PROCEDURE — 84443 ASSAY THYROID STIM HORMONE: CPT | Performed by: EMERGENCY MEDICINE

## 2023-01-01 PROCEDURE — 80162 ASSAY OF DIGOXIN TOTAL: CPT | Performed by: EMERGENCY MEDICINE

## 2023-01-01 PROCEDURE — 94760 N-INVAS EAR/PLS OXIMETRY 1: CPT

## 2023-01-01 RX ORDER — CARBIDOPA AND LEVODOPA 25; 100 MG/1; MG/1
1 TABLET ORAL 3 TIMES DAILY
Status: DISCONTINUED | OUTPATIENT
Start: 2023-01-01 | End: 2023-01-01

## 2023-01-01 RX ORDER — LORAZEPAM 2 MG/ML
2 INJECTION INTRAMUSCULAR
Status: DISCONTINUED | OUTPATIENT
Start: 2023-01-01 | End: 2023-01-01 | Stop reason: HOSPADM

## 2023-01-01 RX ORDER — TAMSULOSIN HYDROCHLORIDE 0.4 MG/1
0.4 CAPSULE ORAL DAILY
Status: DISCONTINUED | OUTPATIENT
Start: 2023-01-01 | End: 2023-01-01

## 2023-01-01 RX ORDER — MORPHINE SULFATE 2 MG/ML
1 INJECTION, SOLUTION INTRAMUSCULAR; INTRAVENOUS
Status: DISCONTINUED | OUTPATIENT
Start: 2023-01-01 | End: 2023-01-01 | Stop reason: HOSPADM

## 2023-01-01 RX ORDER — SODIUM CHLORIDE 0.9 % (FLUSH) 0.9 %
10 SYRINGE (ML) INJECTION
Status: DISCONTINUED | OUTPATIENT
Start: 2023-01-01 | End: 2023-01-01 | Stop reason: HOSPADM

## 2023-01-01 RX ORDER — CARVEDILOL 6.25 MG/1
6.25 TABLET ORAL 2 TIMES DAILY
Status: ON HOLD | COMMUNITY
Start: 2022-01-01 | End: 2023-01-01

## 2023-01-01 RX ORDER — POLYETHYLENE GLYCOL 3350 17 G/17G
17 POWDER, FOR SOLUTION ORAL 2 TIMES DAILY PRN
Status: DISCONTINUED | OUTPATIENT
Start: 2023-01-01 | End: 2023-01-01

## 2023-01-01 RX ORDER — LANOLIN ALCOHOL/MO/W.PET/CERES
400 CREAM (GRAM) TOPICAL DAILY
Status: ON HOLD | COMMUNITY
Start: 2022-01-01 | End: 2023-01-01

## 2023-01-01 RX ORDER — SCOLOPAMINE TRANSDERMAL SYSTEM 1 MG/1
1 PATCH, EXTENDED RELEASE TRANSDERMAL
Status: DISCONTINUED | OUTPATIENT
Start: 2023-01-01 | End: 2023-01-01 | Stop reason: HOSPADM

## 2023-01-01 RX ORDER — ONDANSETRON 2 MG/ML
8 INJECTION INTRAMUSCULAR; INTRAVENOUS EVERY 8 HOURS PRN
Status: CANCELLED | OUTPATIENT
Start: 2023-01-01

## 2023-01-01 RX ORDER — DOPAMINE HYDROCHLORIDE 160 MG/100ML
0-20 INJECTION, SOLUTION INTRAVENOUS CONTINUOUS
Status: DISCONTINUED | OUTPATIENT
Start: 2023-01-01 | End: 2023-01-01

## 2023-01-01 RX ORDER — LORAZEPAM 2 MG/ML
1 INJECTION INTRAMUSCULAR EVERY 30 MIN PRN
Status: DISCONTINUED | OUTPATIENT
Start: 2023-01-01 | End: 2023-01-01 | Stop reason: HOSPADM

## 2023-01-01 RX ORDER — GLYCOPYRROLATE 1 MG/5ML
1 SOLUTION ORAL 3 TIMES DAILY PRN
Status: DISCONTINUED | OUTPATIENT
Start: 2023-01-01 | End: 2023-01-01

## 2023-01-01 RX ORDER — ACETAMINOPHEN 325 MG/1
650 TABLET ORAL EVERY 4 HOURS PRN
Status: DISCONTINUED | OUTPATIENT
Start: 2023-01-01 | End: 2023-01-01

## 2023-01-01 RX ORDER — VIT C/E/ZN/COPPR/LUTEIN/ZEAXAN 250MG-90MG
1 CAPSULE ORAL DAILY
Status: ON HOLD | COMMUNITY
Start: 2022-01-01 | End: 2023-01-01

## 2023-01-01 RX ORDER — METOCLOPRAMIDE HYDROCHLORIDE 5 MG/ML
5 INJECTION INTRAMUSCULAR; INTRAVENOUS EVERY 6 HOURS PRN
Status: CANCELLED | OUTPATIENT
Start: 2023-01-01

## 2023-01-01 RX ORDER — CARBIDOPA AND LEVODOPA 25; 100 MG/1; MG/1
1 TABLET ORAL 3 TIMES DAILY
Status: DISCONTINUED | OUTPATIENT
Start: 2023-01-01 | End: 2023-01-01 | Stop reason: HOSPADM

## 2023-01-01 RX ORDER — ONDANSETRON 2 MG/ML
8 INJECTION INTRAMUSCULAR; INTRAVENOUS EVERY 8 HOURS PRN
Status: DISCONTINUED | OUTPATIENT
Start: 2023-01-01 | End: 2023-01-01 | Stop reason: HOSPADM

## 2023-01-01 RX ORDER — DULOXETIN HYDROCHLORIDE 30 MG/1
30 CAPSULE, DELAYED RELEASE ORAL DAILY
Status: DISCONTINUED | OUTPATIENT
Start: 2023-01-01 | End: 2023-01-01

## 2023-01-01 RX ORDER — MORPHINE SULFATE 2 MG/ML
2 INJECTION, SOLUTION INTRAMUSCULAR; INTRAVENOUS EVERY 4 HOURS PRN
Status: CANCELLED | OUTPATIENT
Start: 2023-01-01

## 2023-01-01 RX ORDER — TRAMADOL HYDROCHLORIDE 50 MG/1
50 TABLET ORAL EVERY 6 HOURS PRN
Status: DISCONTINUED | OUTPATIENT
Start: 2023-01-01 | End: 2023-01-01

## 2023-01-01 RX ORDER — CARVEDILOL 3.12 MG/1
6.25 TABLET ORAL 2 TIMES DAILY
Status: DISCONTINUED | OUTPATIENT
Start: 2023-01-01 | End: 2023-01-01

## 2023-01-01 RX ORDER — AMOXICILLIN 250 MG
1 CAPSULE ORAL 2 TIMES DAILY
Status: DISCONTINUED | OUTPATIENT
Start: 2023-01-01 | End: 2023-01-01

## 2023-01-01 RX ORDER — LORAZEPAM 2 MG/ML
2 INJECTION INTRAMUSCULAR
Status: DISCONTINUED | OUTPATIENT
Start: 2023-01-01 | End: 2023-01-01

## 2023-01-01 RX ORDER — MORPHINE SULFATE 4 MG/ML
4 INJECTION, SOLUTION INTRAMUSCULAR; INTRAVENOUS
Status: DISCONTINUED | OUTPATIENT
Start: 2023-01-01 | End: 2023-01-01 | Stop reason: HOSPADM

## 2023-01-01 RX ORDER — LANOLIN ALCOHOL/MO/W.PET/CERES
400 CREAM (GRAM) TOPICAL DAILY
Status: DISCONTINUED | OUTPATIENT
Start: 2023-01-01 | End: 2023-01-01

## 2023-01-01 RX ORDER — LORAZEPAM 2 MG/ML
2 INJECTION INTRAMUSCULAR EVERY 4 HOURS PRN
Status: DISCONTINUED | OUTPATIENT
Start: 2023-01-01 | End: 2023-01-01

## 2023-01-01 RX ORDER — MORPHINE SULFATE 4 MG/ML
4 INJECTION, SOLUTION INTRAMUSCULAR; INTRAVENOUS
Status: CANCELLED | OUTPATIENT
Start: 2023-01-01

## 2023-01-01 RX ORDER — ASPIRIN 81 MG/1
81 TABLET ORAL DAILY
Status: DISCONTINUED | OUTPATIENT
Start: 2023-01-01 | End: 2023-01-01

## 2023-01-01 RX ORDER — ATROPINE SULFATE 10 MG/ML
2 SOLUTION/ DROPS OPHTHALMIC EVERY 4 HOURS PRN
Status: DISCONTINUED | OUTPATIENT
Start: 2023-01-01 | End: 2023-01-01 | Stop reason: HOSPADM

## 2023-01-01 RX ORDER — METOCLOPRAMIDE HYDROCHLORIDE 5 MG/ML
5 INJECTION INTRAMUSCULAR; INTRAVENOUS EVERY 6 HOURS PRN
Status: DISCONTINUED | OUTPATIENT
Start: 2023-01-01 | End: 2023-01-01 | Stop reason: HOSPADM

## 2023-01-01 RX ORDER — MIRTAZAPINE 15 MG/1
15 TABLET, ORALLY DISINTEGRATING ORAL NIGHTLY
Status: DISCONTINUED | OUTPATIENT
Start: 2023-01-01 | End: 2023-01-01

## 2023-01-01 RX ORDER — MORPHINE SULFATE 2 MG/ML
2 INJECTION, SOLUTION INTRAMUSCULAR; INTRAVENOUS EVERY 30 MIN PRN
Status: DISCONTINUED | OUTPATIENT
Start: 2023-01-01 | End: 2023-01-01 | Stop reason: HOSPADM

## 2023-01-01 RX ORDER — MORPHINE SULFATE 2 MG/ML
2 INJECTION, SOLUTION INTRAMUSCULAR; INTRAVENOUS EVERY 4 HOURS PRN
Status: DISCONTINUED | OUTPATIENT
Start: 2023-01-01 | End: 2023-01-01 | Stop reason: HOSPADM

## 2023-01-01 RX ORDER — CIPROFLOXACIN 250 MG/1
250 TABLET, FILM COATED ORAL 2 TIMES DAILY
COMMUNITY
Start: 2022-01-01 | End: 2023-01-01

## 2023-01-01 RX ORDER — DOPAMINE HYDROCHLORIDE 160 MG/100ML
10 INJECTION, SOLUTION INTRAVENOUS CONTINUOUS
Status: DISCONTINUED | OUTPATIENT
Start: 2023-01-01 | End: 2023-01-01

## 2023-01-01 RX ORDER — HYOSCYAMINE SULFATE 0.12 MG/1
0.12 TABLET SUBLINGUAL EVERY 4 HOURS PRN
Status: DISCONTINUED | OUTPATIENT
Start: 2023-01-01 | End: 2023-01-01 | Stop reason: HOSPADM

## 2023-01-01 RX ORDER — BISACODYL 10 MG
10 SUPPOSITORY, RECTAL RECTAL DAILY PRN
Status: DISCONTINUED | OUTPATIENT
Start: 2023-01-01 | End: 2023-01-01

## 2023-01-01 RX ADMIN — MORPHINE SULFATE 2 MG/HR: 10 INJECTION, SOLUTION INTRAMUSCULAR; INTRAVENOUS at 01:01

## 2023-01-01 RX ADMIN — MORPHINE SULFATE 1 MG: 2 INJECTION, SOLUTION INTRAMUSCULAR; INTRAVENOUS at 11:01

## 2023-01-01 RX ADMIN — LORAZEPAM 1 MG: 2 INJECTION INTRAMUSCULAR; INTRAVENOUS at 05:01

## 2023-01-01 RX ADMIN — SCOPALAMINE 1 PATCH: 1 PATCH, EXTENDED RELEASE TRANSDERMAL at 07:01

## 2023-01-01 RX ADMIN — DOPAMINE HYDROCHLORIDE IN DEXTROSE 10 MCG/KG/MIN: 1.6 INJECTION, SOLUTION INTRAVENOUS at 04:01

## 2023-01-01 RX ADMIN — SODIUM CHLORIDE 1000 ML: 0.9 INJECTION, SOLUTION INTRAVENOUS at 04:01

## 2023-01-01 RX ADMIN — LORAZEPAM 1 MG: 2 INJECTION INTRAMUSCULAR; INTRAVENOUS at 06:01

## 2023-01-01 RX ADMIN — HYOSCYAMINE SULFATE 0.12 MG: 0.12 TABLET ORAL; SUBLINGUAL at 04:01

## 2023-01-01 RX ADMIN — MORPHINE SULFATE 1 MG: 2 INJECTION, SOLUTION INTRAMUSCULAR; INTRAVENOUS at 09:01

## 2023-01-01 RX ADMIN — MORPHINE SULFATE 1 MG: 2 INJECTION, SOLUTION INTRAMUSCULAR; INTRAVENOUS at 05:01

## 2023-01-01 RX ADMIN — LORAZEPAM 2 MG: 2 INJECTION INTRAMUSCULAR; INTRAVENOUS at 06:01

## 2023-01-01 RX ADMIN — MORPHINE SULFATE 1 MG: 2 INJECTION, SOLUTION INTRAMUSCULAR; INTRAVENOUS at 06:01

## 2023-01-03 PROBLEM — Z95.1 HISTORY OF CORONARY ARTERY BYPASS GRAFT: Status: ACTIVE | Noted: 2023-01-01

## 2023-01-03 PROBLEM — N17.9 ACUTE KIDNEY INJURY SUPERIMPOSED ON CHRONIC KIDNEY DISEASE: Status: ACTIVE | Noted: 2023-01-01

## 2023-01-03 PROBLEM — N18.9 ACUTE KIDNEY INJURY SUPERIMPOSED ON CHRONIC KIDNEY DISEASE: Status: ACTIVE | Noted: 2023-01-01

## 2023-01-03 PROBLEM — Z79.01 CURRENT USE OF LONG TERM ANTICOAGULATION: Status: ACTIVE | Noted: 2023-01-01

## 2023-01-03 PROBLEM — Z86.79 HISTORY OF COMPLETE HEART BLOCK: Status: ACTIVE | Noted: 2023-01-01

## 2023-01-03 PROBLEM — R00.1 SYMPTOMATIC BRADYCARDIA: Status: ACTIVE | Noted: 2023-01-01

## 2023-01-03 NOTE — PHARMACY MED REC
"[facility]           Pharmacy  Admission Medication History     The home medication history was taken by Rhonda Lai.      Medication history obtained from Medications listed below were obtained from: Nursing home    Based on information gathered for medication list, you may go to "Admission" then "Reconcile Home Medications" tabs to review and/or act upon those items.     The home medication list has been updated by the Pharmacy department.   Please read ALL comments highlighted in yellow.   Please address this information as you see fit.    Feel free to contact us if you have any questions or require assistance.    The medications listed below were removed from the home medication list.  Please reorder if appropriate:    Patient reports NOT TAKING the following medication(s):  Flexeril 5mg  Cipro 250mg        No current facility-administered medications on file prior to encounter.     Current Outpatient Medications on File Prior to Encounter   Medication Sig Dispense Refill    acetaminophen (TYLENOL) 500 MG tablet Take 500 mg by mouth every 6 (six) hours as needed for Pain.      apixaban (ELIQUIS) 2.5 mg Tab Take 1 tablet (2.5 mg total) by mouth 2 (two) times daily. 60 tablet 0    aspirin (ECOTRIN) 81 MG EC tablet Take 81 mg by mouth once daily.      carbidopa-levodopa  mg (SINEMET)  mg per tablet Take 1 tablet by mouth 3 (three) times daily. 90 tablet 5    carvediloL (COREG) 6.25 MG tablet Take 6.25 mg by mouth 2 (two) times daily.      diclofenac sodium (VOLTAREN) 1 % Gel Apply to left shoulder twice daily      digoxin (LANOXIN) 125 mcg tablet Take 125 mcg by mouth once daily.        docusate sodium (COLACE) 100 MG capsule Take 100 mg by mouth once daily.      DULoxetine (CYMBALTA) 30 MG capsule Take 30 mg by mouth once daily.      furosemide (LASIX) 20 MG tablet Take 20 mg by mouth once daily.       magnesium oxide (MAG-OX) 400 mg (241.3 mg magnesium) tablet Take 400 mg by " mouth once daily.      mirtazapine (REMERON) 15 MG tablet Take 15 mg by mouth every evening.      ondansetron (ZOFRAN) 4 MG tablet Take 4 mg by mouth every 6 (six) hours as needed for Nausea.      pantoprazole (PROTONIX) 40 MG tablet Take 40 mg by mouth once daily.      PRESERVISION AREDS-2 250-90-40-1 mg Cap Take 1 capsule by mouth once daily.      tamsulosin (FLOMAX) 0.4 mg Cap Take 1 capsule (0.4 mg total) by mouth once daily.      traMADoL (ULTRAM) 50 mg tablet Take 50 mg by mouth 3 (three) times daily.         Please address this information as you see fit.  Feel free to contact us if you have any questions or require assistance.    Rhonda Lai  [phone]                  .

## 2023-01-03 NOTE — Clinical Note
Diagnosis: Bradycardia [503919]   Admitting Provider:: VIRGINIA QUIROS [[id number]]   Future Attending Provider: DAO BOSWELL [[id number]]   Reason for IP Medical Treatment  (Clinical interventions that can only be accomplished in the IP setting? ) :: symptomatic bradycardia on dopamine gtt   Estimated Length of Stay:: 2 midnights   I certify that Inpatient services for greater than or equal to 2 midnights are medically necessary:: Yes   Plans for Post-Acute care--if anticipated (pick the single best option):: A. No post acute care anticipated at this time

## 2023-01-03 NOTE — ED NOTES
Dr man spoke with cardiology. Pt to remain on Dopamine with HR and stable BP. Attempted to interrogate pacemaker without success. Reaching out to [employer] at this time. Will cont to monitor.

## 2023-01-03 NOTE — ED NOTES
Dr Middleton at bedside. Pacer dc'd at this time. Dopamine infusion increased to 20 mcg/kg/min. Pt's HR remains around 30. Repeat EKG performed. MD placed order for pacemaker interrogation. Waiting additional orders at this time. Will cont to monitor.

## 2023-01-03 NOTE — ED PROVIDER NOTES
Encounter Date: 1/3/2023       History     Chief Complaint   Patient presents with    Bradycardia     Ems reports sinus bradycardia , pt was brought in from [facility] home. Pt is a dnr .  Pt was given 500cc of normal saline and 3mg of atropine prior to arrival with no change in status.     88 yo male from [facility] here for bradycardia.  Pt is DNR has a pacemaker.  Per EMS staff noticed his heart rate and called EMS.  EMS gave 3mg of Atropine IV enroute.    Review of patient's allergies indicates:   Allergen Reactions    Amiodarone analogues     Keflex [cephalexin]      Tolerating ceftriaxone admission 3/2021     Past Medical History:   Diagnosis Date    Acute post-hemorrhagic anemia 3/4/2021    Acute pulmonary embolism 9/23/2015    Asbestosis     Coronary artery disease     Hyperlipidemia     Septic shock 2/22/2019    Stroke     UTI (urinary tract infection) 2/22/2019     Past Surgical History:   Procedure Laterality Date    CARDIAC CATHETERIZATION      CARDIAC VALVE SURGERY      CORONARY ARTERY BYPASS GRAFT      INSERT / REPLACE / REMOVE PACEMAKER       No family history on file.  Social History     Tobacco Use    Smoking status: Never   Substance Use Topics    Alcohol use: Not Currently    Drug use: Never     Review of Systems   Unable to perform ROS: Acuity of condition   All other systems reviewed and are negative.    Physical Exam     Initial Vitals [01/03/23 1518]   BP Pulse Resp Temp SpO2   (!) 86/46 (!) 25 18 97.6 °F (36.4 °C) 100 %      MAP       --         Physical Exam    Nursing note and vitals reviewed.  Constitutional:   88 yo male who is alert and awake, tracking me but not speaking   HENT:   Head: Normocephalic and atraumatic.   Eyes: Pupils are equal, round, and reactive to light.   Cardiovascular:            Bradycardic rate abnormal rhythm   Pulmonary/Chest: Breath sounds normal. He has no wheezes.   Abdominal: Abdomen is soft.   No grimace on palpation no rigidity no distension   Musculoskeletal:       Comments: No obvious deformity pulses present all 4 extremities     Neurological:   Awake, alert, tracks me and tries to answer my questions   Skin: Capillary refill takes less than 2 seconds.   No acute rashes large chronic vertical scar from sternal notch just past Xyphoid process.   Psychiatric: He has a normal mood and affect.       ED Course   Critical Care    Date/Time: 1/3/2023 10:00 PM  Performed by: Alex Middleton DO  Authorized by: Alex Middleton DO   Direct patient critical care time: 30 minutes  Ordering / reviewing critical care time: 5 minutes  Consulting other physicians critical care time: 10 minutes  Consult with family critical care time: 5 minutes  Total critical care time (exclusive of procedural time) : 50 minutes  Critical care time was exclusive of separately billable procedures and treating other patients.  Critical care was necessary to treat or prevent imminent or life-threatening deterioration of the following conditions: cardiac failure, dehydration and shock.  Critical care was time spent personally by me on the following activities: development of treatment plan with patient or surrogate, discussions with consultants, interpretation of cardiac output measurements, examination of patient, ordering and performing treatments and interventions, ordering and review of radiographic studies, ordering and review of laboratory studies, pulse oximetry, re-evaluation of patient's condition and transcutaneous pacing.      Labs Reviewed   B-TYPE NATRIURETIC PEPTIDE - Abnormal; Notable for the following components:       Result Value     (*)     All other components within normal limits   CBC W/ AUTO DIFFERENTIAL - Abnormal; Notable for the following components:    RBC 4.12 (*)     Hemoglobin 11.7 (*)     Hematocrit 37.0 (*)     MCHC 31.6 (*)     RDW 14.7 (*)     All other components within normal limits   COMPREHENSIVE METABOLIC PANEL - Abnormal; Notable for the following  components:    Glucose 118 (*)     BUN 56 (*)     Creatinine 2.0 (*)     Albumin 3.2 (*)     ALT <5 (*)     eGFR 31 (*)     All other components within normal limits   MAGNESIUM - Abnormal; Notable for the following components:    Magnesium 2.7 (*)     All other components within normal limits   TROPONIN I - Abnormal; Notable for the following components:    Troponin I 0.207 (*)     All other components within normal limits   ISTAT PROCEDURE - Abnormal; Notable for the following components:    POC PCO2 51.2 (*)     POC PO2 16 (*)     POC HCO3 30.6 (*)     POC SATURATED O2 21 (*)     POC TCO2 32 (*)     All other components within normal limits   POCT GLUCOSE - Abnormal; Notable for the following components:    POCT Glucose 148 (*)     All other components within normal limits   CULTURE, BLOOD   CULTURE, BLOOD   LACTIC ACID, PLASMA   PROTIME-INR   TSH   DIGOXIN LEVEL   DIGOXIN LEVEL   URINALYSIS   TYPE & SCREEN   POCT GLUCOSE MONITORING CONTINUOUS     EKG Readings: (Independently Interpreted)   Atrial Fib with slow ventricular response, paced EKG rate of approx 50     Imaging Results              X-Ray Chest 1 View (Final result)  Result time 01/03/23 15:58:52      Final result by Nehemiah Rubio III, MD (01/03/23 15:58:52)                   Impression:      Mild CHF.    Calcified pleural plaques.      Electronically signed by: Nehemiah Rubio MD  Date:    01/03/2023  Time:    15:58               Narrative:    EXAMINATION:  XR CHEST 1 VIEW    CLINICAL HISTORY:  Bradycardia, unspecified    FINDINGS:  Chest one view: There is a pacemaker.  There is cardiomegaly.  There is aortic plaque.  Lungs demonstrate increased vascularity and mild edema.                                       Medications   DOPamine 400 mg in dextrose 5 % 250 mL infusion (premix) (20 mcg/kg/min × 63.5 kg Intravenous Rate/Dose Change 1/3/23 5877)   apixaban tablet 2.5 mg (has no administration in time range)   aspirin EC tablet 81 mg (has no  administration in time range)   carbidopa-levodopa  mg per tablet 1 tablet (has no administration in time range)   carvediloL tablet 6.25 mg (has no administration in time range)   DULoxetine DR capsule 30 mg (has no administration in time range)   magnesium oxide tablet 400 mg (has no administration in time range)   mirtazapine disintegrating tablet 15 mg (has no administration in time range)   tamsulosin 24 hr capsule 0.4 mg (has no administration in time range)   sodium chloride 0.9% flush 10 mL (has no administration in time range)   acetaminophen tablet 650 mg (has no administration in time range)   traMADoL tablet 50 mg (has no administration in time range)   senna-docusate 8.6-50 mg per tablet 1 tablet (has no administration in time range)   polyethylene glycol packet 17 g (has no administration in time range)   bisacodyL suppository 10 mg (has no administration in time range)   sodium chloride 0.9% bolus 1,000 mL 1,000 mL (0 mLs Intravenous Stopped 1/3/23 1708)     Medical Decision Making:   Clinical Tests:   Lab Tests: Reviewed       <> Summary of Lab: Patient shows mild anemia with a hemoglobin of 11.7 and hematocrit 37, there is no fever and no leukocytosis, BUN of 56 with a creatinine of 2 is most likely secondary to hypovolemia, IV fluids were started for this.  Potassium and magnesium are within acceptable results and there is no need for supplementation.  Patient is currently maintaining a stable blood pressure on IV dopamine and cardiology states that that is okay as long as blood pressure remained stable.  We will plan on placing patient back on transcutaneous pacemaker if dopamine does not work.  Chest x-ray shows no abnormalities with the pacemaker along with cardiomegaly and mild edema which is consistent with the patient's past medical history of heart failure.  Radiological Study: Reviewed           ED Course as of 01/03/23 2200   Tue Jan 03, 2023   1537 Transcutaneous pacer started at  8mA and HR of 50 with capture.  Plan to transition from pacer to dopamine.  Obtaining VBG for lytes. [CD]   1540 I STAT lytes show potassium of 4.7, will continue with current treatment. [CD]   1549 Pt continues to maintain HR in the 50's, BP is stable pt is awake and alert continues to track me. [CD]   1630 Patient is on full dose dopamine, transcutaneous pacer has been turned off, he has maintain heart rate in 30s with a systolic blood pressure in the 110-120s, interrogation of pacemaker is about to begin [CD]   1650 Spoke to Dr Chowdhury, cardiology, explained bradycardia which was transcutaneously paced and maintained a heart rate of 50 with a stable blood pressure, I also explained that I started the patient on dopamine it is maxed out patient is maintaining a heart rate in 30s but does also have a stable systolic blood pressure between 110-120.  He states to interrogate the pacemaker, it is okay to keep patient on dopamine drip as long as he maintains a stable blood pressure. [CD]   1700 Spoke with [facility] who will admit the patient for further workup. [CD]   1849 [facility] states that pacemaker is not functional, Dr Chowdhury notified. [CD]      ED Course User Index  [CD] Alex Middleton DO                   Clinical Impression:   Final diagnoses:  [R00.1] Bradycardia (Primary)        ED Disposition Condition    Admit                 Alex Middleton DO  01/03/23 3901

## 2023-01-04 PROBLEM — Z71.89 GOALS OF CARE, COUNSELING/DISCUSSION: Status: ACTIVE | Noted: 2023-01-01

## 2023-01-04 NOTE — ED NOTES
Pt requested to keep dopamine gtt onboard until a  can be located for last rights. Nursing team working on contacting pastoral care at this time.

## 2023-01-04 NOTE — ASSESSMENT & PLAN NOTE
History of complete heart block  Pacemaker   Atrial fibrillation   Current use of long-term anticoagulation  -patient admitted from [facility] with symptomatic bradycardia  -at admission BP stable, heart rate in 30s with transcutaneous pacing and max dose dopamine gtt in place  -admission labs/diagnostics: CBC with known anemia; stable.  Chemistry with normal potassium at 4.8, magnesium 2.7, BUN 56, 2.0; consistent with SHANEL.  LFTs WNLs.  Digoxin level 1.1.  .  Troponin 0.207.  Lactic acid 2.2.  TSH 3.026.  Chest x-ray with mild CHF.  EKG with idioventricular rhythm and right bundle-branch block  -Cardiology consulted in ED  -PPM interrogation in process  -continue dopamine gtt  -continue TV pacing  -hold carvedilol and digoxin  -continue home OAC  -Cardiology consulted  -TTE pending  -EKG PRN concerns  -labs in AM and replete electrolytes as indicated  -monitor

## 2023-01-04 NOTE — ASSESSMENT & PLAN NOTE
Advance Care Planning     [facility]  I engaged the family in a conversation about advance care planning and we specifically addressed what the goals of care would be moving forward, in light of the patient's change in clinical status, specifically bradycardia.  We did specifically address the patient's likely prognosis, which is poor.  We explored the patient's values and preferences for future care.  The family endorses that what is most important right now is to focus on quality of life, even if it means sacrificing a little time    Accordingly, we have decided that the best plan to meet the patient's goals includes enrolling in hospice care    I did explain the role for hospice care at this stage of the patient's illness, including its ability to help the patient live with the best quality of life possible.  We will be making a hospice referral.    I spent a total of 10 minutes engaging the patient in this advance care planning discussion.

## 2023-01-04 NOTE — ASSESSMENT & PLAN NOTE
-chronic; history of diastolic heart failure  -in setting of symptomatic bradycardia and hypoperfusion   -mild CHF on chest x-ray with    -avoid IV fluid hydration at current d/t concerns for volume overload in current acute cardiac condition  -hold home carvedilol, digoxin, and Lasix for now  -continue tele monitoring  -strict I&Os and daily weights

## 2023-01-04 NOTE — SUBJECTIVE & OBJECTIVE
Past Medical History:   Diagnosis Date    Acute post-hemorrhagic anemia 3/4/2021    Acute pulmonary embolism 9/23/2015    Asbestosis     Coronary artery disease     Hyperlipidemia     Septic shock 2/22/2019    Stroke     UTI (urinary tract infection) 2/22/2019       Past Surgical History:   Procedure Laterality Date    CARDIAC CATHETERIZATION      CARDIAC VALVE SURGERY      CORONARY ARTERY BYPASS GRAFT      INSERT / REPLACE / REMOVE PACEMAKER         Review of patient's allergies indicates:   Allergen Reactions    Amiodarone analogues     Keflex [cephalexin]      Tolerating ceftriaxone admission 3/2021       No current facility-administered medications on file prior to encounter.     Current Outpatient Medications on File Prior to Encounter   Medication Sig    acetaminophen (TYLENOL) 500 MG tablet Take 500 mg by mouth every 6 (six) hours as needed for Pain.    apixaban (ELIQUIS) 2.5 mg Tab Take 1 tablet (2.5 mg total) by mouth 2 (two) times daily.    aspirin (ECOTRIN) 81 MG EC tablet Take 81 mg by mouth once daily.    carbidopa-levodopa  mg (SINEMET)  mg per tablet Take 1 tablet by mouth 3 (three) times daily.    carvediloL (COREG) 6.25 MG tablet Take 6.25 mg by mouth 2 (two) times daily.    diclofenac sodium (VOLTAREN) 1 % Gel Apply to left shoulder twice daily    digoxin (LANOXIN) 125 mcg tablet Take 125 mcg by mouth once daily.      docusate sodium (COLACE) 100 MG capsule Take 100 mg by mouth once daily.    DULoxetine (CYMBALTA) 30 MG capsule Take 30 mg by mouth once daily.    furosemide (LASIX) 20 MG tablet Take 20 mg by mouth once daily.     magnesium oxide (MAG-OX) 400 mg (241.3 mg magnesium) tablet Take 400 mg by mouth once daily.    mirtazapine (REMERON) 15 MG tablet Take 15 mg by mouth every evening.    ondansetron (ZOFRAN) 4 MG tablet Take 4 mg by mouth every 6 (six) hours as needed for Nausea.    pantoprazole (PROTONIX) 40 MG tablet Take 40 mg by mouth once daily.    PRESERVISION AREDS-2  250-90-40-1 mg Cap Take 1 capsule by mouth once daily.    tamsulosin (FLOMAX) 0.4 mg Cap Take 1 capsule (0.4 mg total) by mouth once daily.    traMADoL (ULTRAM) 50 mg tablet Take 50 mg by mouth 3 (three) times daily.     Family History    None       Tobacco Use    Smoking status: Never    Smokeless tobacco: Not on file   Substance and Sexual Activity    Alcohol use: Not Currently    Drug use: Never    Sexual activity: Not Currently     Review of Systems   Unable to perform ROS: Patient unresponsive     Objective:     Vital Signs (Most Recent):  Temp: 97.6 °F (36.4 °C) (01/03/23 1518)  Pulse: (!) 32 (01/03/23 2232)  Resp: (!) 27 (01/03/23 2232)  BP: (!) 157/71 (01/03/23 2215)  SpO2: 100 % (01/03/23 2232)   Vital Signs (24h Range):  Temp:  [97.6 °F (36.4 °C)] 97.6 °F (36.4 °C)  Pulse:  [25-68] 32  Resp:  [15-38] 27  SpO2:  [90 %-100 %] 100 %  BP: ()/(46-79) 157/71     Weight: 63.5 kg (140 lb)  Body mass index is 20.67 kg/m².    Physical Exam  Constitutional:       Appearance: He is ill-appearing and toxic-appearing.      Comments: Very frail and ill appearing male.    HENT:      Mouth/Throat:      Mouth: Mucous membranes are dry.   Eyes:      Conjunctiva/sclera: Conjunctivae normal.   Cardiovascular:      Rate and Rhythm: Bradycardia present.      Heart sounds: Murmur heard.   Pulmonary:      Effort: Pulmonary effort is normal.      Comments: Decreased breath sounds bilateral bases.   Abdominal:      General: Abdomen is flat. There is no distension.      Palpations: Abdomen is soft.      Tenderness: There is no abdominal tenderness.   Musculoskeletal:      Right lower leg: No edema.      Left lower leg: No edema.   Skin:     General: Skin is dry.      Coloration: Skin is pale.   Neurological:      Mental Status: He is lethargic.      Comments: Moans only. Appears uncomfortable with TC pacing.         Significant Labs: All pertinent labs within the past 24 hours have been reviewed.  CBC:   Recent Labs   Lab  01/03/23  1531 01/03/23  1534   WBC 9.60  --    HGB 11.7*  --    HCT 37.0* 37     --      CMP:   Recent Labs   Lab 01/03/23  1531      K 4.8      CO2 28   *   BUN 56*   CREATININE 2.0*   CALCIUM 8.7   PROT 8.4   ALBUMIN 3.2*   BILITOT 0.5   ALKPHOS 76   AST 14   ALT <5*   ANIONGAP 8     Cardiac Markers:   Recent Labs   Lab 01/03/23  1531   *     Lactic Acid:   Recent Labs   Lab 01/03/23  1531   LACTATE 2.2     Magnesium:   Recent Labs   Lab 01/03/23  1531   MG 2.7*     Troponin:   Recent Labs   Lab 01/03/23  1531   TROPONINI 0.207*       Significant Imaging: I have reviewed all pertinent imaging results/findings within the past 24 hours.

## 2023-01-04 NOTE — ED NOTES
Pt's HR decreased to 15-25. Dr Middleton informed. Pacer initiated. Pt tolerating well at this time. Daughters at bedside. Will cont to monitor.

## 2023-01-04 NOTE — ASSESSMENT & PLAN NOTE
CKD, stage III  -in setting of symptomatic bradycardia and hypoperfusion   -history of diastolic heart failure  -mild CHF on chest x-ray with    -avoid IV fluid hydration at current d/t concerns for volume overload in current acute cardiac condition  -avoid nephrotoxins and hypotension as able, renally dose medications  -labs in AM  -monitor

## 2023-01-04 NOTE — ASSESSMENT & PLAN NOTE
-chronic  -controlled   -off dopamine gtt  -hold home carvedilol, digoxin, Lasix as noted above   -monitor

## 2023-01-04 NOTE — ASSESSMENT & PLAN NOTE
History of coronary artery bypass graft   History of aortic stenosis   s/p AVR   -chronic   -continue home atorvastatin  -holding home carvedilol and digoxin as noted above   -EKG PRN concerns   -monitor

## 2023-01-04 NOTE — ED NOTES
Family updated on bed assignment   Pt transported to tele on portable transport monitor and portable o2   Eyes open   Respirations unlabored   Receiving RN at bedside and assisted with moving pt to hospital bed

## 2023-01-04 NOTE — ASSESSMENT & PLAN NOTE
History of coronary artery bypass graft   History of aortic stenosis   s/p AVR   -chronic   -holding home carvedilol and digoxin as noted above   -EKG PRN concerns   -monitor

## 2023-01-04 NOTE — HPI
Mr. Green is an 89 YOM with PMHx of CAD s/p CABG, AS s/p AVR, diastolic CHF, HLD, HTN, CHB s/p permanent pacemaker, PAFib (on digoxin, carvedilol, and apixaban), CKD III (baseline 1.6-1.7), Parkinson's disease on Sinemet, and chronic anemia. He is a resident of the [facility].    He presented to ED via EMS after nursing home staff noted bradycardia; he was given 3 mg of atropine en-route without significant improvement in heart rate.  Patient is lethargic and unable to answer questions, simply moans however does open eyes and flinches with TC pacer.  Family at bedside reports he has been in his normal state of health as of recently; however they do endorse he is frail and appears very ill at current.    In the ED BP stable, heart rate in 30s with transcutaneous pacing and on max dose dopamine gtt.  CBC with known anemia; stable.  Chemistry with normal potassium at 4.8, magnesium 2.7, BUN 56, 2.0; consistent with SHANEL.  LFTs WNLs.  Digoxin level 1.1.  .  Troponin 0.207.  Lactic acid 2.2.  TSH 3.026.  Chest x-ray with mild CHF.  EKG with idioventricular rhythm and right bundle-branch block.  Cardiology consulted in ED.  Patient is DNR.    The patient was admitted to the ICU and Hospital Medicine Service for further evaluation and management.

## 2023-01-04 NOTE — ASSESSMENT & PLAN NOTE
-chronic  -controlled   -stable on dopamine gtt  -hold home carvedilol, digoxin, Lasix as noted above   -monitor

## 2023-01-04 NOTE — ASSESSMENT & PLAN NOTE
-chronic  -CBC stable   -monitor for signs and symptoms of bleeding  -monitor CBC periodically over hospital course

## 2023-01-04 NOTE — ED NOTES
Family requested to transition to comfort measures. Cardiology and hospitalist contacted. See orders  for details.

## 2023-01-04 NOTE — ED NOTES
Report received from ROMINA Grissom   Assumed care of pt   Pt resting in stretcher   Heart rate michelle and irregular  Respirations slow, deep,  and pt intermittently grunting  Family at bedside   Accepting of pt's current status   Will continue with current plan of care

## 2023-01-04 NOTE — PLAN OF CARE
01/04/23 1243   Admission   Initial VN Admission Questions Complete   Communication Issues? None   Shift   Virtual Nurse - Rounding Complete   Virtual Nurse - Patient Verbalized Approval Of Camera Use   Safety/Activity   Patient Rounds placement of personal items at bedside;clutter free environment maintained;call light in patient/parent reach;bed wheels locked;bed in low position;ID band on  (family at bedside)

## 2023-01-04 NOTE — ASSESSMENT & PLAN NOTE
CKD, stage III  -in setting of symptomatic bradycardia and hypoperfusion   -history of diastolic heart failure  -mild CHF on chest x-ray with    -avoid IV fluid hydration at current d/t concerns for volume overload and tenuous cardiac condition at current  -avoid nephrotoxins and hypotension as able, renally dose medications  -monitor

## 2023-01-04 NOTE — CARE UPDATE
Patient's family wishes to discontinue treatment and proceed with comfort measures only and no further procedural interventions.  Emotional support provided.   met with patient's family for last rites.  Transcutaneous pacemaker was discontinued; dopamine gtt was discontinued. Vitals, lab draws, PO medications, and tactile stimulating interventions discontinued.  Comfort care orders initiated.      May Gong DNP, AG-ACNP, BC  Department of Hospital Medicine  [facility]

## 2023-01-04 NOTE — ASSESSMENT & PLAN NOTE
History of complete heart block  Pacemaker   Atrial fibrillation   Current use of long-term anticoagulation  -patient admitted from [facility] with symptomatic bradycardia  -at admission BP stable, heart rate in 30s with transcutaneous pacing and max dose dopamine gtt in place  -admission labs/diagnostics: CBC with known anemia; stable.  Chemistry with normal potassium at 4.8, magnesium 2.7, BUN 56, 2.0; consistent with SHANEL.  LFTs WNLs.  Digoxin level 1.1.  .  Troponin 0.207.  Lactic acid 2.2.  TSH 3.026.  Chest x-ray with mild CHF.  EKG with idioventricular rhythm and right bundle-branch block  -Cardiology consulted in ED  -PPM interrogation with dead battery  -stop pacing  -hold carvedilol and digoxin  -Cardiology consulted  -TTE  -EKG PRN concerns  -labs in AM and replete electrolytes as indicated  -monitor

## 2023-01-04 NOTE — H&P
[facility]  [facility] Medicine  History & Physical    Patient Name: Vipul Green  MRN: [id number]  Patient Class: IP- Inpatient  Admission Date: 1/3/2023  Attending Physician: Eh Lindsey, *   Primary Care Provider: Jed Thomas MD    Patient information was obtained from relative(s), EMS personnel, past medical records and ER records.     Subjective:     Principal Problem:Symptomatic bradycardia    Chief Complaint:   Chief Complaint   Patient presents with    Bradycardia     Ems reports sinus bradycardia , pt was brought in from [facility]. Pt is a dnr .  Pt was given 500cc of normal saline and 3mg of atropine prior to arrival with no change in status.        HPI: Mr. Green is an 89 YOM with PMHx of CAD s/p CABG, AS s/p AVR, diastolic CHF, HLD, HTN, CHB s/p permanent pacemaker, PAFib (on digoxin, carvedilol, and apixaban), CKD III (baseline 1.6-1.7), Parkinson's disease on Sinemet, and chronic anemia. He is a resident of the [facility] [place].    He presented to ED via EMS after nursing home staff noted bradycardia; he was given 3 mg of atropine en-route without significant improvement in heart rate.  Patient is lethargic and unable to answer questions, simply moans however does open eyes and flinches with TC pacer.  Family at bedside reports he has been in his normal state of health as of recently; however they do endorse he is frail and appears very ill at current.    In the ED BP stable, heart rate in 30s with transcutaneous pacing and on max dose dopamine gtt.  CBC with known anemia; stable.  Chemistry with normal potassium at 4.8, magnesium 2.7, BUN 56, 2.0; consistent with SHANEL.  LFTs WNLs.  Digoxin level 1.1.  .  Troponin 0.207.  Lactic acid 2.2.  TSH 3.026.  Chest x-ray with mild CHF.  EKG with idioventricular rhythm and right bundle-branch block.  Cardiology consulted in ED.  Patient is DNR.    The patient was admitted to the ICU and Hospital  Medicine Service for further evaluation and management.     Past Medical History:   Diagnosis Date    Acute post-hemorrhagic anemia 3/4/2021    Acute pulmonary embolism 9/23/2015    Asbestosis     Coronary artery disease     Hyperlipidemia     Septic shock 2/22/2019    Stroke     UTI (urinary tract infection) 2/22/2019       Past Surgical History:   Procedure Laterality Date    CARDIAC CATHETERIZATION      CARDIAC VALVE SURGERY      CORONARY ARTERY BYPASS GRAFT      INSERT / REPLACE / REMOVE PACEMAKER         Review of patient's allergies indicates:   Allergen Reactions    Amiodarone analogues     Keflex [cephalexin]      Tolerating ceftriaxone admission 3/2021       No current facility-administered medications on file prior to encounter.     Current Outpatient Medications on File Prior to Encounter   Medication Sig    acetaminophen (TYLENOL) 500 MG tablet Take 500 mg by mouth every 6 (six) hours as needed for Pain.    apixaban (ELIQUIS) 2.5 mg Tab Take 1 tablet (2.5 mg total) by mouth 2 (two) times daily.    aspirin (ECOTRIN) 81 MG EC tablet Take 81 mg by mouth once daily.    carbidopa-levodopa  mg (SINEMET)  mg per tablet Take 1 tablet by mouth 3 (three) times daily.    carvediloL (COREG) 6.25 MG tablet Take 6.25 mg by mouth 2 (two) times daily.    diclofenac sodium (VOLTAREN) 1 % Gel Apply to left shoulder twice daily    digoxin (LANOXIN) 125 mcg tablet Take 125 mcg by mouth once daily.      docusate sodium (COLACE) 100 MG capsule Take 100 mg by mouth once daily.    DULoxetine (CYMBALTA) 30 MG capsule Take 30 mg by mouth once daily.    furosemide (LASIX) 20 MG tablet Take 20 mg by mouth once daily.     magnesium oxide (MAG-OX) 400 mg (241.3 mg magnesium) tablet Take 400 mg by mouth once daily.    mirtazapine (REMERON) 15 MG tablet Take 15 mg by mouth every evening.    ondansetron (ZOFRAN) 4 MG tablet Take 4 mg by mouth every 6 (six) hours as needed for Nausea.    pantoprazole (PROTONIX) 40 MG tablet  Take 40 mg by mouth once daily.    PRESERVISION AREDS-2 250-90-40-1 mg Cap Take 1 capsule by mouth once daily.    tamsulosin (FLOMAX) 0.4 mg Cap Take 1 capsule (0.4 mg total) by mouth once daily.    traMADoL (ULTRAM) 50 mg tablet Take 50 mg by mouth 3 (three) times daily.     Family History    None       Tobacco Use    Smoking status: Never    Smokeless tobacco: Not on file   Substance and Sexual Activity    Alcohol use: Not Currently    Drug use: Never    Sexual activity: Not Currently     Review of Systems   Unable to perform ROS: Patient unresponsive     Objective:     Vital Signs (Most Recent):  Temp: 97.6 °F (36.4 °C) (01/03/23 1518)  Pulse: (!) 32 (01/03/23 2232)  Resp: (!) 27 (01/03/23 2232)  BP: (!) 157/71 (01/03/23 2215)  SpO2: 100 % (01/03/23 2232)   Vital Signs (24h Range):  Temp:  [97.6 °F (36.4 °C)] 97.6 °F (36.4 °C)  Pulse:  [25-68] 32  Resp:  [15-38] 27  SpO2:  [90 %-100 %] 100 %  BP: ()/(46-79) 157/71     Weight: 63.5 kg (140 lb)  Body mass index is 20.67 kg/m².    Physical Exam  Constitutional:       Appearance: He is ill-appearing and toxic-appearing.      Comments: Very frail and ill appearing male.    HENT:      Mouth/Throat:      Mouth: Mucous membranes are dry.   Eyes:      Conjunctiva/sclera: Conjunctivae normal.   Cardiovascular:      Rate and Rhythm: Bradycardia present.      Heart sounds: Murmur heard.   Pulmonary:      Effort: Pulmonary effort is normal.      Comments: Decreased breath sounds bilateral bases.   Abdominal:      General: Abdomen is flat. There is no distension.      Palpations: Abdomen is soft.      Tenderness: There is no abdominal tenderness.   Musculoskeletal:      Right lower leg: No edema.      Left lower leg: No edema.   Skin:     General: Skin is dry.      Coloration: Skin is pale.   Neurological:      Mental Status: He is lethargic.      Comments: Moans only. Appears uncomfortable with TC pacing.         Significant Labs: All pertinent labs within the past 24  hours have been reviewed.  CBC:   Recent Labs   Lab 01/03/23  1531 01/03/23  1534   WBC 9.60  --    HGB 11.7*  --    HCT 37.0* 37     --      CMP:   Recent Labs   Lab 01/03/23  1531      K 4.8      CO2 28   *   BUN 56*   CREATININE 2.0*   CALCIUM 8.7   PROT 8.4   ALBUMIN 3.2*   BILITOT 0.5   ALKPHOS 76   AST 14   ALT <5*   ANIONGAP 8     Cardiac Markers:   Recent Labs   Lab 01/03/23  1531   *     Lactic Acid:   Recent Labs   Lab 01/03/23  1531   LACTATE 2.2     Magnesium:   Recent Labs   Lab 01/03/23  1531   MG 2.7*     Troponin:   Recent Labs   Lab 01/03/23  1531   TROPONINI 0.207*       Significant Imaging: I have reviewed all pertinent imaging results/findings within the past 24 hours.    Assessment/Plan:     * Symptomatic bradycardia  History of complete heart block  Pacemaker   Atrial fibrillation   Current use of long-term anticoagulation  -patient admitted from [facility] with symptomatic bradycardia  -at admission BP stable, heart rate in 30s with transcutaneous pacing and max dose dopamine gtt in place  -admission labs/diagnostics: CBC with known anemia; stable.  Chemistry with normal potassium at 4.8, magnesium 2.7, BUN 56, 2.0; consistent with SHANEL.  LFTs WNLs.  Digoxin level 1.1.  .  Troponin 0.207.  Lactic acid 2.2.  TSH 3.026.  Chest x-ray with mild CHF.  EKG with idioventricular rhythm and right bundle-branch block  -Cardiology consulted in ED  -PPM interrogation in process  -continue dopamine gtt  -continue TV pacing  -hold carvedilol and digoxin  -continue home OAC  -Cardiology consulted  -TTE pending  -EKG PRN concerns  -labs in AM and replete electrolytes as indicated  -monitor     Acute kidney injury superimposed on chronic kidney disease  CKD, stage III  -in setting of symptomatic bradycardia and hypoperfusion   -history of diastolic heart failure  -mild CHF on chest x-ray with    -avoid IV fluid hydration at current d/t concerns for  volume overload and tenuous cardiac condition at current  -avoid nephrotoxins and hypotension as able, renally dose medications  -labs in AM  -monitor     Chronic diastolic congestive heart failure  -chronic; history of diastolic heart failure  -in setting of symptomatic bradycardia and hypoperfusion   -mild CHF on chest x-ray with    -avoid IV fluid hydration at current d/t concerns for volume overload in current acute cardiac condition  -hold home carvedilol, digoxin, and Lasix for now  -continue tele monitoring  -strict I&Os and daily weights    Essential hypertension  -chronic  -controlled   -stable on dopamine gtt  -hold home carvedilol, digoxin, Lasix as noted above   -monitor    Coronary artery disease  History of coronary artery bypass graft   History of aortic stenosis   s/p AVR   -chronic   -continue home atorvastatin  -holding home carvedilol and digoxin as noted above   -EKG PRN concerns   -monitor    Hyperlipidemia  -chronic  -continue home atorvastatin    Parkinson disease  -chronic  -continue home Sinemet   -fall precautions    Normocytic anemia  -chronic  -CBC stable   -monitor for signs and symptoms of bleeding  -monitor CBC periodically over hospital course      VTE Risk Mitigation (From admission, onward)           Ordered     IP VTE HIGH RISK PATIENT  Once         01/03/23 1830     Reason for No Pharmacological VTE Prophylaxis  Once        Question:  Reasons:  Answer:  Already adequately anticoagulated on oral Anticoagulants    01/03/23 1830                    May Gong, DNP, AG-ACNP, BC  Department of Hospital Medicine  [facility]

## 2023-01-04 NOTE — PLAN OF CARE
01/04/23 1211   Nurse Notification   Bedside Nurse Notified? Yes   Name of Bedside Nurse Janene   Nurse Notfication Method Telephone   Nurse Notified Of Other  (MEWS score= 7; HR charted on flowsheet= 29. VN verified with bedside nurse, DNR status and orders for Comfort Care only. Family at bedside and aware of plan of care.)

## 2023-01-04 NOTE — NURSING
Family just reported monitor HR went to 0 a couple minutes ago for about 15 seconds patient opened his eyes then Hr went back up to 20 and patients eyes closed , HR presently 26 O2 sats 100%, pallor eyes closed cool to touch.

## 2023-01-05 PROBLEM — Z51.5 COMFORT MEASURES ONLY STATUS: Status: ACTIVE | Noted: 2023-01-01

## 2023-01-05 NOTE — PROGRESS NOTES
01/05/23 1311   Respiratory   Respiratory WDL ex  (morphine drip initiated.)   Rhythm/Pattern, Respiratory apneic

## 2023-01-05 NOTE — DISCHARGE SUMMARY
[facility] Medicine  Discharge Summary      Patient Name: Vipul Green  MRN: [id number]  JOSSIE: [id number]  Patient Class: IP- Inpatient  Admission Date: 1/3/2023  Hospital Length of Stay: 2 days  Discharge Date and Time:  01/05/2023 9:47 AM  Attending Physician: Eh Lindsey, *   Discharging Provider: Eh Lindsey MD  Primary Care Provider: Jed Thomas MD    Primary Care Team: Networked reference to record PCT     HPI:   Mr. Green is an 89 YOM with PMHx of CAD s/p CABG, AS s/p AVR, diastolic CHF, HLD, HTN, CHB s/p permanent pacemaker, PAFib (on digoxin, carvedilol, and apixaban), CKD III (baseline 1.6-1.7), Parkinson's disease on Sinemet, and chronic anemia. He is a resident of the [facility] [place].    He presented to ED via EMS after nursing home staff noted bradycardia; he was given 3 mg of atropine en-route without significant improvement in heart rate.  Patient is lethargic and unable to answer questions, simply moans however does open eyes and flinches with TC pacer.  Family at bedside reports he has been in his normal state of health as of recently; however they do endorse he is frail and appears very ill at current.    In the ED BP stable, heart rate in 30s with transcutaneous pacing and on max dose dopamine gtt.  CBC with known anemia; stable.  Chemistry with normal potassium at 4.8, magnesium 2.7, BUN 56, 2.0; consistent with SHANEL.  LFTs WNLs.  Digoxin level 1.1.  .  Troponin 0.207.  Lactic acid 2.2.  TSH 3.026.  Chest x-ray with mild CHF.  EKG with idioventricular rhythm and right bundle-branch block.  Cardiology consulted in ED.  Patient is DNR.    The patient was admitted to the ICU and Hospital Medicine Service for further evaluation and management.         * No surgery found *      Hospital Course:   Mr. Green presented with symptomatic bradycardia with complete heart block, found to have failed battery with pacemaker.  "Initially given atropine and started on TCP in the ED with dopamine gtt. Goals of care discussion had with family, who note that transition to palliative approach would be consistent with goals of care at this point. TCP stopped and patient transitioned to comfort measures. Discharge to inpatient hospice.    BP (!) 113/53 (BP Location: Right arm, Patient Position: Lying)   Pulse (!) 50   Temp 97.8 °F (36.6 °C) (Axillary)   Resp 12   Ht 5' 9" (1.753 m)   Wt 63.5 kg (139 lb 15.9 oz)   SpO2 98%   BMI 20.67 kg/m²   Physical Exam  Constitutional:       Appearance: He is ill-appearing and toxic-appearing.      Comments: Very frail and ill appearing male.    HENT:      Mouth/Throat:      Mouth: Mucous membranes are dry.   Eyes:      Conjunctiva/sclera: Conjunctivae normal.   Cardiovascular:      Rate and Rhythm: Bradycardia present.      Heart sounds: Murmur heard.   Pulmonary:      Effort: Pulmonary effort is normal.   Abdominal:      General: Abdomen is flat. There is no distension.      Palpations: Abdomen is soft.      Tenderness: There is no abdominal tenderness.   Musculoskeletal:      Right lower leg: No edema.      Left lower leg: No edema.   Skin:     General: Skin is dry.      Coloration: Skin is pale.   Neurological:      Mental Status: He is lethargic.        Goals of Care Treatment Preferences:  Code Status: DNR          What is most important right now is to focus on quality of life, even if it means sacrificing a little time.  Accordingly, we have decided that the best plan to meet the patient's goals includes enrolling in hospice care.      Consults:   Consults (From admission, onward)        Status Ordering Provider     Inpatient consult to Palliative Care  Once        Provider:  Emelia Granados MD    Acknowledged SESAR CALHOUN     Inpatient consult to Palliative Care  Once        Provider:  Riccardo Blount Jr., MD    Acknowledged DAO BOSWELL     Inpatient consult to PICC team " (NIAS)  Once        Provider:  (Not yet assigned)    Acknowledged VIRGINIA QUIROS          No new Assessment & Plan notes have been filed under this hospital service since the last note was generated.  Service: Hospital Medicine    Final Active Diagnoses:    Diagnosis Date Noted POA    PRINCIPAL PROBLEM:  Symptomatic bradycardia [R00.1] 01/03/2023 Yes    Goals of care, counseling/discussion [Z71.89] 01/04/2023 Not Applicable    History of coronary artery bypass graft [Z95.1] 01/03/2023 Not Applicable    History of complete heart block [Z86.79] 01/03/2023 Not Applicable    Current use of long term anticoagulation [Z79.01] 01/03/2023 Not Applicable    Acute kidney injury superimposed on chronic kidney disease [N17.9, N18.9] 01/03/2023 Yes    Chronic kidney disease, stage III (moderate) [N18.30] 03/03/2021 Yes    Coronary artery disease [I25.10] 03/03/2021 Yes    Normocytic anemia [D64.9] 03/03/2021 Yes    Parkinson disease [G20] 10/20/2015 Yes    Chronic diastolic congestive heart failure [I50.32] 09/23/2015 Yes    Atrial fibrillation [I48.91] 11/08/2012 Yes    Essential hypertension [I10] 11/08/2012 Yes    Hyperlipidemia [E78.5] 11/08/2012 Yes    Pacemaker [Z95.0] 11/08/2012 Yes    S/P AVR (aortic valve replacement) [Z95.2] 11/08/2012 Not Applicable      Problems Resolved During this Admission:       Discharged Condition: poor    Disposition: Hospice/Medical Facility    Follow Up:    Patient Instructions:      Diet Adult Regular     Notify your health care provider if you experience any of the following:  temperature >100.4     Notify your health care provider if you experience any of the following:  persistent nausea and vomiting or diarrhea     Notify your health care provider if you experience any of the following:  severe uncontrolled pain     Notify your health care provider if you experience any of the following:  difficulty breathing or increased cough     Notify your health care provider  if you experience any of the following:  severe persistent headache     Notify your health care provider if you experience any of the following:  persistent dizziness, light-headedness, or visual disturbances     Notify your health care provider if you experience any of the following:  increased confusion or weakness     Activity as tolerated       Significant Diagnostic Studies: see above    Pending Diagnostic Studies:     None         Medications:  Reconciled Home Medications:      Medication List      CONTINUE taking these medications    carbidopa-levodopa  mg  mg per tablet  Commonly known as: SINEMET  Take 1 tablet by mouth 3 (three) times daily.        STOP taking these medications    acetaminophen 500 MG tablet  Commonly known as: TYLENOL     apixaban 2.5 mg Tab  Commonly known as: ELIQUIS     aspirin 81 MG EC tablet  Commonly known as: ECOTRIN     carvediloL 6.25 MG tablet  Commonly known as: COREG     diclofenac sodium 1 % Gel  Commonly known as: VOLTAREN     digoxin 125 mcg tablet  Commonly known as: LANOXIN     docusate sodium 100 MG capsule  Commonly known as: COLACE     DULoxetine 30 MG capsule  Commonly known as: CYMBALTA     furosemide 20 MG tablet  Commonly known as: LASIX     magnesium oxide 400 mg (241.3 mg magnesium) tablet  Commonly known as: MAG-OX     mirtazapine 15 MG tablet  Commonly known as: REMERON     ondansetron 4 MG tablet  Commonly known as: ZOFRAN     pantoprazole 40 MG tablet  Commonly known as: PROTONIX     PRESERVISION AREDS-2 250-90-40-1 mg Cap  Generic drug: vit C,A-Jy-huhru-lutein-zeaxan     tamsulosin 0.4 mg Cap  Commonly known as: FLOMAX     traMADoL 50 mg tablet  Commonly known as: ULTRAM            Indwelling Lines/Drains at time of discharge:   Lines/Drains/Airways     None                 Time spent on the discharge of patient: 31 minutes         Eh Lindsey MD  Department of Hospital Medicine  [facility]

## 2023-01-05 NOTE — HOSPITAL COURSE
"Mr. Green presented with symptomatic bradycardia with complete heart block, found to have failed battery with pacemaker. Initially given atropine and started on TCP in the ED with dopamine gtt. Goals of care discussion had with family, who note that transition to palliative approach would be consistent with goals of care at this point. TCP stopped and patient transitioned to comfort measures. Discharge to inpatient hospice.    BP (!) 113/53 (BP Location: Right arm, Patient Position: Lying)   Pulse (!) 50   Temp 97.8 °F (36.6 °C) (Axillary)   Resp 12   Ht 5' 9" (1.753 m)   Wt 63.5 kg (139 lb 15.9 oz)   SpO2 98%   BMI 20.67 kg/m²   Physical Exam  Constitutional:       Appearance: He is ill-appearing and toxic-appearing.      Comments: Very frail and ill appearing male.    HENT:      Mouth/Throat:      Mouth: Mucous membranes are dry.   Eyes:      Conjunctiva/sclera: Conjunctivae normal.   Cardiovascular:      Rate and Rhythm: Bradycardia present.      Heart sounds: Murmur heard.   Pulmonary:      Effort: Pulmonary effort is normal.   Abdominal:      General: Abdomen is flat. There is no distension.      Palpations: Abdomen is soft.      Tenderness: There is no abdominal tenderness.   Musculoskeletal:      Right lower leg: No edema.      Left lower leg: No edema.   Skin:     General: Skin is dry.      Coloration: Skin is pale.   Neurological:      Mental Status: He is lethargic.   "

## 2023-01-05 NOTE — PLAN OF CARE
MASON met with pt's family son Michael [phone] and camila Moscoso [phone] at bedside to complete DCA. Pt has resided at the [facility] for the past 5 years. Pt's family now looking into hospice care and unsure of pt will return to [facility]. MASON spoke with Bakari with [facility], Bakari reported that they to have available beds, MASON sent hospice referral via their fax. MASON spoke with Major Henriquez she confirmed that they too have open beds, SW hard faxed referral. SW will follow. White board updated with CM name and contact information.  Discharge brochure provided.  Pt encouraged to call with any questions or concerns.  Cm will continue to follow pt through transitions of care and assist with any discharge needs.    ABBEY Zaidi  [phone]    No future appointments.     01/05/23 0854   Discharge Assessment   Assessment Type Discharge Planning Assessment   Confirmed/corrected address, phone number and insurance Yes   Confirmed Demographics Correct on Facesheet   Source of Information family   When was your last doctors appointment?   (per family a few weeks)   Communicated JENNIFER with patient/caregiver Yes   Reason For Admission Symptomatic bradycardia   People in Home facility resident   Facility Arrived From: [facility]   Do you expect to return to your current living situation? No   Do you have help at home or someone to help you manage your care at home? Yes   Who are your caregiver(s) and their phone number(s)? [facility]   Prior to hospitilization cognitive status: Unable to Assess   Current cognitive status: Unable to Assess   Walking or Climbing Stairs ambulation difficulty, requires equipment   Dressing/Bathing bathing difficulty, requires equipment   Home Accessibility wheelchair accessible   Home Layout Able to live on 1st floor   Equipment Currently Used at Home hospital bed;wheelchair   Readmission within 30 days? No   Patient currently being followed by outpatient case management? No   Do you  currently have service(s) that help you manage your care at home? Yes   Name and Contact number of agency [facility]   Is the pt/caregiver preference to resume services with current agency Yes   Do you take prescription medications? Yes   Do you have prescription coverage? Yes   Coverage BCBS   Do you have any problems affording any of your prescribed medications? No   Is the patient taking medications as prescribed? yes   Who is going to help you get home at discharge? Likely hospice care   How do you get to doctors appointments? family or friend will provide;health plan transportation   Are you on dialysis? No   Do you take coumadin? No   Discharge Plan A Inpatient Hospice   DME Needed Upon Discharge  none   Discharge Plan discussed with: Adult children   Discharge Barriers Identified None

## 2023-01-05 NOTE — H&P
[facility]  Palliative Medicine  History & Physical    Patient Name: Vipul Green  MRN: [id number]  Admission Date: 1/5/2023  Attending Physician: Riccardo Blount Jr., MD   Primary Care Provider: Jed Thomas MD    Patient information was obtained from relative(s) and primary team.     Subjective:     Chief Complaint/Reason for Admission: Symptomatic bradycardia    History of Present Illness:     Mr. Green is an 89 YOM with PMHx of CAD s/p CABG, AS s/p AVR, diastolic CHF, HLD, HTN, CHB s/p permanent pacemaker, PAFib (on digoxin, carvedilol, and apixaban), CKD III (baseline 1.6-1.7), Parkinson's disease on Sinemet, and chronic anemia. He is a resident of the [facility].     He presented to ED via EMS after nursing home staff noted bradycardia; he was given 3 mg of atropine en-route without significant improvement in heart rate.  Patient is lethargic and unable to answer questions, simply moans however does open eyes and flinches with TC pacer.  Family at bedside reports he has been in his normal state of health as of recently; however they do endorse he is frail and appears very ill at current.     In the ED BP stable, heart rate in 30s with transcutaneous pacing and on max dose dopamine gtt.  CBC with known anemia; stable.  Chemistry with normal potassium at 4.8, magnesium 2.7, BUN 56, 2.0; consistent with SHANEL.  LFTs WNLs.  Digoxin level 1.1.  .  Troponin 0.207.  Lactic acid 2.2.  TSH 3.026.  Chest x-ray with mild CHF.  EKG with idioventricular rhythm and right bundle-branch block, pacer not sensing and firing at backup rate of 50 bpm without capture c/w critical malfunction.  Cardiology consulted in ED. Family decided against aggressive interventions and pt was made comfort care only under DNR status.    Had large family sheikh yesterday and pt continues to have bradycardia in 20s with long pauses but is intermittently alert. Intermittent low dose IV morphine ordered  for generalized pains and tenderness with moderate improvement.    Palliative has been consulted for goals of care.    Met with pt's daughters Blanka and Hortencia at bedside and extensively discussed clinical course. Reassured fears that pt's pacemaker battery had been neglected, EKG indicates that it is firing however not sensing or capturing and will need full replacement including transvenous pacing and a high risk operation. Family recognizes pt is at end of life and does not wish to subject pt to invasive interventions.    Pt's prognosis is grave, somewhat muddled by his intermittent lucidity however with the severity of his bradyarrhythmia remains at high risk for sudden cardiac death following any long pause. Given uncertainty over stability for transfer family wishes to keep patient here and has consented for admission to the inpatient hospice service under [facility].    Will add morphine gtt given persistent tenderness otherwise pt will remain in place. Family aware that we will need to make preparations for transfer if pt remains on service > 2 midnights and has appropriate understanding, open to transfer if pt is a candidate.    Physical Exam  Constitutional:       Appearance: He is cachectic. He is ill-appearing.   HENT:      Head: Normocephalic and atraumatic.      Comments: Bitemporal wasting     Mouth/Throat:      Mouth: Mucous membranes are dry.      Pharynx: No oropharyngeal exudate.   Eyes:      Pupils: Pupils are equal, round, and reactive to light.   Cardiovascular:      Rate and Rhythm: Regular rhythm. Bradycardia present.      Pulses: Normal pulses.      Heart sounds: Normal heart sounds. No murmur heard.    No friction rub. No gallop.   Pulmonary:      Effort: Pulmonary effort is normal.      Breath sounds: Normal breath sounds. No wheezing or rales.   Abdominal:      General: Abdomen is flat.      Palpations: Abdomen is soft.      Tenderness: There is abdominal tenderness.   Musculoskeletal:          General: Tenderness present.   Skin:     Coloration: Skin is pale.   Neurological:      Mental Status: He is lethargic.      Motor: Weakness present.      Comments: Waxing and waning level of consciousness   Psychiatric:         Mood and Affect: Affect is flat.         Speech: Speech is delayed and slurred.     Assessment/Plan:     # Symptomatic bradycardia  - hx of Afib s/p PPM now malfunctioning, device at end of life  - not a candidate for device replacement, does not need to be turned off  - no further treatments    # End of life care  - persistently tender and grimaces when most alert, cannot localize  - start morphine gtt at 2mg/hr with 2mg ivp q4h prn pain, 4mg ivp q2h prn dyspnea  - ativan 2mg ivp q4h prn anxiety  - zofran 1st line for n/v, reglan 2nd line    Prognosis < 72 hours and with symptoms requiring parenteral access and frequent monitoring, appropriate for inpatient hospice care.    No notes have been filed under this hospital service.  Service: Palliative Medicine    VTE Risk Mitigation (From admission, onward)      None          Total time spent: 120 minutes  > 50% of 70 minute visit spent in chart review, face to face discussion of symptoms assessment, coordination of care with other specialties, and d/c planning.    50 min ACP time spent: goals of care, emotional support, formulating and communicating prognosis and exploring burden/benefit of various approaches of treatment.      Riccardo Blount Jr, MD  Palliative Medicine  [facility] - Telemetry    Advance Care Planning     Date: 01/05/2023    Code Status  In light of the patients advanced and life limiting illness,I engaged the the family in a conversation about the patient's preferences for care  at the very end of life. The patient wishes to have a natural, peaceful death.  Along those lines, the patient does not wish to have CPR or other invasive treatments performed when his heart and/or breathing stops. I communicated to the family that  a DNR order would be placed in his medical record to reflect this preference.  I spent a total of 50 minutes engaging the patient in this advance care planning discussion.       [facility]  I engaged the family in a conversation about advance care planning and we specifically addressed what the goals of care would be moving forward, in light of the patient's change in clinical status, specifically sick sinus syndrome with malfunctioning pacemaker and severe symptomatic bradycardia.  We did specifically address the patient's likely prognosis, which is poor.  We explored the patient's values and preferences for future care.  The family endorses that what is most important right now is to focus on remaining as independent as possible, symptom/pain control, and quality of life, even if it means sacrificing a little time    Accordingly, we have decided that the best plan to meet the patient's goals includes enrolling in hospice care    I did explain the role for hospice care at this stage of the patient's illness, including its ability to help the patient live with the best quality of life possible.  We will be making a hospice referral.    I spent a total of 50 minutes engaging the patient in this advance care planning discussion.

## 2023-01-05 NOTE — PLAN OF CARE
Pt to be flipped to inpatient hospice here at the hospital. Rounds completed on pt.  All questions addressed.  Bedside nurse to discuss d/c medications.  Discussed importance to attend all f/u appts and take medications as prescribed.  Verbalized understanding.    ABBEY Zaidi  [phone]    No future appointments.     01/05/23 0947   Final Note   Assessment Type Final Discharge Note   Anticipated Discharge Disposition [facility]   What phone number can be called within the next 1-3 days to see how you are doing after discharge? [phone]   Post-Acute Status   Coverage [employer]   Discharge Delays None known at this time

## 2023-01-05 NOTE — NURSING
Pt resting overnight with family to bedside. Pt given Morphine for help with comfort for grimacing. Pt answering some questions overnight. Opening his eyes and following some commands intermittently. Cleaned and repositioned for comfort.

## 2023-01-06 NOTE — PLAN OF CARE
Problem: Palliative Care  Goal: Enhanced Quality of Life  Outcome: Ongoing, Progressing  Chart check complete. Vitals, orders, labs, and progress notes reviewed. Care plan updated. Will monitor.

## 2023-01-06 NOTE — NURSING
Spoke to Arlin RN at [facility], Okay to order Scopalamine patch under Dr Blount

## 2023-01-06 NOTE — NURSING
Spoke with Nurse Arlin at [facility] to get an order for something PRN for pt secretion. Arlin ordered Levsin 0.125mg ODT SL Q4h PRN.

## 2023-01-06 NOTE — PLAN OF CARE
Per chart Pt  this AM. CM to sign off on pt and close out chart.     Rhett Danielle, MSW  [phone]    No future appointments.     23 1304   Final Note   Assessment Type Final Discharge Note   Anticipated Discharge Disposition    What phone number can be called within the next 1-3 days to see how you are doing after discharge? [phone]   Hospital Resources/Appts/Education Provided Appointments scheduled and added to AVS   Post-Acute Status   Coverage BCBS   Discharge Delays None known at this time

## 2023-01-06 NOTE — SIGNIFICANT EVENT
HPM Pronouncement Note    Notified by RN that pt had ceased respirations. Examined with family present. Fixed unreactive pupils. No chest rise or breath sounds. No palpable pulses or heart sounds. Family at bedside expresses gratitude for pt's comfort and dignity.    TOD 4347 11/6/23.    Riccardo Blount MD  Hospice and Palliative Medicine  Palliative Care Pager: [phone]

## 2023-01-08 LAB
BACTERIA BLD CULT: NORMAL
BACTERIA BLD CULT: NORMAL

## 2023-01-09 NOTE — DISCHARGE SUMMARY
[facility] - Telemetry  Palliative Medicine  Discharge Summary    Patient Name: Vipul Green  MRN: [id number]  Admission Date: 1/5/2023  Hospital Length of Stay: 1 days  Discharge Date and Time: 1/6/2023  3:00 PM  Attending Physician: No att. providers found   Discharging Provider: Riccardo Blount Jr, MD  Primary Care Provider: Jed Thomas MD    HPI:   Mr. Green is an 89 YOM with PMHx of CAD s/p CABG, AS s/p AVR, diastolic CHF, HLD, HTN, CHB s/p permanent pacemaker, PAFib (on digoxin, carvedilol, and apixaban), CKD III (baseline 1.6-1.7), Parkinson's disease on Sinemet, and chronic anemia. He is a resident of the [facility].     He presented to ED via EMS after nursing home staff noted bradycardia; he was given 3 mg of atropine en-route without significant improvement in heart rate.  Patient is lethargic and unable to answer questions, simply moans however does open eyes and flinches with TC pacer.  Family at bedside reports he has been in his normal state of health as of recently; however they do endorse he is frail and appears very ill at current.     In the ED BP stable, heart rate in 30s with transcutaneous pacing and on max dose dopamine gtt.  CBC with known anemia; stable.  Chemistry with normal potassium at 4.8, magnesium 2.7, BUN 56, 2.0; consistent with SHANEL.  LFTs WNLs.  Digoxin level 1.1.  .  Troponin 0.207.  Lactic acid 2.2.  TSH 3.026.  Chest x-ray with mild CHF.  EKG with idioventricular rhythm and right bundle-branch block, no pacer activity.  Cardiology consulted in ED. Family decided against aggressive interventions and pt was made comfort care only under DNR status.     Had large family sheikh yesterday and pt continues to have bradycardia in 20s with long pauses but is intermittently alert. Intermittent low dose IV morphine ordered for generalized pains and tenderness with moderate improvement.     Palliative has been consulted for goals of care.     Met with  pt's daughters Blanka and Hortencia at bedside and extensively discussed clinical course. Reassured fears that pt's pacemaker battery had been neglected, EKG indicates that it is firing however not sensing or capturing and will need full replacement including transvenous pacing and a high risk operation. Family recognizes pt is at end of life and does not wish to subject pt to invasive interventions.     Pt's prognosis is grave, somewhat muddled by his intermittent lucidity however with the severity of his bradyarrhythmia remains at high risk for sudden cardiac death following any long pause. Given uncertainty over stability for transfer family wishes to keep patient here and has consented for admission to the inpatient hospice service under [facility].    Hospital Course:   Pt had frequent agitation overnight responsive to ativan, escalated morphine gtt in AM and made ativan standing with family in agreement. Pt became progressively apneic and lost pulse the next morning. TOD [id number] 23 with family keeping sheikh at bedside, comforted by writer and hospice team.    Goals of Care Treatment Preferences:  Code Status: DNR          What is most important right now is to focus on remaining as independent as possible, symptom/pain control, quality of life, even if it means sacrificing a little time.  Accordingly, we have decided that the best plan to meet the patient's goals includes enrolling in hospice care.      Consults:     No new Assessment & Plan notes have been filed under this hospital service since the last note was generated.  Service: Palliative Medicine    Final Active Diagnoses:    Diagnosis Date Noted POA    Comfort measures only status [Z51.5] 2023 Not Applicable      Problems Resolved During this Admission:       Discharged Condition:     Disposition:  in Medical Facil*    Follow Up:    Patient Instructions:   No discharge procedures on file.    Significant Diagnostic Studies:  none    Pending Diagnostic Studies:       None           Medications:  None    Indwelling Lines/Drains at time of discharge:   Lines/Drains/Airways       None                   Time spent on the discharge of patient: 25 minutes  Patient was seen and examined on the date of discharge and determined to be suitable for discharge.    Riccardo Blount Jr, MD  Department of Palliative Medicine  [facility]

## 2023-11-08 NOTE — SUBJECTIVE & OBJECTIVE
Interval History: comfortable with family at bedside. Offered condolences and support.    Review of Systems   Unable to perform ROS: Acuity of condition   Objective:     Vital Signs (Most Recent):  Temp: 97.8 °F (36.6 °C) (01/04/23 1119)  Pulse: (!) 24 (01/04/23 1119)  Resp: (!) 25 (01/04/23 1119)  BP: (!) 113/53 (01/04/23 1119)  SpO2: 96 % (01/04/23 1119)   Vital Signs (24h Range):  Temp:  [97.6 °F (36.4 °C)-97.8 °F (36.6 °C)] 97.8 °F (36.6 °C)  Pulse:  [23-68] 24  Resp:  [15-38] 25  SpO2:  [90 %-100 %] 96 %  BP: ()/(46-79) 113/53     Weight: 63.5 kg (139 lb 15.9 oz)  Body mass index is 20.67 kg/m².  No intake or output data in the 24 hours ending 01/04/23 1316   Physical Exam  Constitutional:       Appearance: He is ill-appearing and toxic-appearing.      Comments: Very frail and ill appearing male.    HENT:      Mouth/Throat:      Mouth: Mucous membranes are dry.   Eyes:      Conjunctiva/sclera: Conjunctivae normal.   Cardiovascular:      Rate and Rhythm: Bradycardia present.      Heart sounds: Murmur heard.   Pulmonary:      Effort: Pulmonary effort is normal.   Abdominal:      General: Abdomen is flat. There is no distension.      Palpations: Abdomen is soft.      Tenderness: There is no abdominal tenderness.   Musculoskeletal:      Right lower leg: No edema.      Left lower leg: No edema.   Skin:     General: Skin is dry.      Coloration: Skin is pale.   Neurological:      Mental Status: He is lethargic.       Significant Labs: All pertinent labs within the past 24 hours have been reviewed.    Significant Imaging: I have reviewed all pertinent imaging results/findings within the past 24 hours.   SUBJECTIVE  Chief Complaint   Patient presents with    Fever    Headache       HPI This child is with dad. This little boy had the onset of headache 48 hours ago. The headache is stopped but within the last 24 to 36 hours he is spike temps of 101 to 102. Has been a slight decrease in appetite. No other respiratory symptoms. Review of Systems   Constitutional:  Positive for appetite change and fever. HENT:  Negative for ear pain and sore throat. Eyes:  Negative for discharge. Respiratory:  Negative for cough. Gastrointestinal:  Negative for constipation, diarrhea, nausea and vomiting. Skin:  Negative for rash. Neurological:  Positive for headaches. Psychiatric/Behavioral:  The patient is not hyperactive. All other systems reviewed and are negative. Past Medical History:   Diagnosis Date    Chronic suppurative otitis media of both ears 4/20/2020    Nummular eczema 2/20/2020    RSV (acute bronchiolitis due to respiratory syncytial virus) 1/8/2020    Tonsillar hypertrophy 9/26/2022    Tympanostomy tube check 9/24/2020       No family history on file. No Known Allergies    OBJECTIVE  Physical Exam  HENT:      Right Ear: Tympanic membrane normal.      Left Ear: Tympanic membrane normal.      Mouth/Throat:      Pharynx: Posterior oropharyngeal erythema present. Comments: He has had a tonsillectomy but there is generalized erythema on the soft palate and the lateral pharyngeal walls  Eyes:      Pupils: Pupils are equal, round, and reactive to light. Comments: Good red reflex   Cardiovascular:      Rate and Rhythm: Normal rate and regular rhythm. Heart sounds: No murmur heard. Pulmonary:      Effort: Pulmonary effort is normal.      Breath sounds: Normal breath sounds. Abdominal:      General: Bowel sounds are normal.      Palpations: Abdomen is soft. Musculoskeletal:         General: Normal range of motion. Lymphadenopathy:      Cervical: Cervical adenopathy present.

## 2025-08-09 NOTE — PROGRESS NOTES
[facility] Medicine  Progress Note    Patient Name: Vipul Green  MRN: [id number]  Patient Class: IP- Inpatient   Admission Date: 1/3/2023  Length of Stay: 1 days  Attending Physician: Eh Lindsey, *  Primary Care Provider: Jed Thomas MD        Subjective:     Principal Problem:Symptomatic bradycardia        HPI:  Mr. Green is an 89 YOM with PMHx of CAD s/p CABG, AS s/p AVR, diastolic CHF, HLD, HTN, CHB s/p permanent pacemaker, PAFib (on digoxin, carvedilol, and apixaban), CKD III (baseline 1.6-1.7), Parkinson's disease on Sinemet, and chronic anemia. He is a resident [facility].    He presented to ED via EMS after nursing home staff noted bradycardia; he was given 3 mg of atropine en-route without significant improvement in heart rate.  Patient is lethargic and unable to answer questions, simply moans however does open eyes and flinches with TC pacer.  Family at bedside reports he has been in his normal state of health as of recently; however they do endorse he is frail and appears very ill at current.    In the ED BP stable, heart rate in 30s with transcutaneous pacing and on max dose dopamine gtt.  CBC with known anemia; stable.  Chemistry with normal potassium at 4.8, magnesium 2.7, BUN 56, 2.0; consistent with SHANEL.  LFTs WNLs.  Digoxin level 1.1.  .  Troponin 0.207.  Lactic acid 2.2.  TSH 3.026.  Chest x-ray with mild CHF.  EKG with idioventricular rhythm and right bundle-branch block.  Cardiology consulted in ED.  Patient is DNR.    The patient was admitted to the ICU and Hospital Medicine Service for further evaluation and management.         Overview/Hospital Course:  No notes on file    Interval History: comfortable with family at bedside. Offered condolences and support.    Review of Systems   Unable to perform ROS: Acuity of condition   Objective:     Vital Signs (Most Recent):  Temp: 97.8 °F (36.6 °C) (01/04/23 1119)  Pulse:  Stroke Neurology Progress Note    24h events:  No acute events overnight. S/p 1 g solumedrol yesterday. Remains slow to respond     Background: Viry Camara is a 75 year old female with a past medical history of DM2, HTN, hypothyroidism, on Aspirin 81mg daily home, and recently hospitalized at [facility] (7/23/25- 2/29/25) for cholecystitis s/p bilary stent and UTI, and recent history of  concern for  meningitis treated emperically with 1 week of antibiotic with negative LP with residual left lower leg weakness presenting to [facility] ED after event of unresponsiveness/not aciting like self and increased left sided weakness. Pt. At baseline has been unable to walk for last few months. Has had CT and MRI for the C/T/L spine during her [facility] admission, was negative for etiology of pt's weakness. Vascular Neurology consulted for concern for acute vascular event with findings of severe focal stenosis of the Right P1/P2 junctions.    Pt. LKN reported as 8/1/25 at 1330. Per report around 1500 family noted increased left sided weakness and pt. Not \"acting like self\", difficult to arouse with BP of 90/46.  EMS was called and pt. Brought to [facility] ED as Code Stroke. [facility] evaluated pt. Giving her a NIHSS of 19 (1-consciousness, 2-question, 1-command, 1-FD, 2-LUE, 1-RUE, 4-LLE, 4-RLE, 1-Sensory, 1-Language, 1-dysarthria), /59, and blood sugar of 109. STAT head CT completed with no acute findings. Pt. Deemed not to be a TNK candidate as unclear last known normal. CTA Head/Neck along with CTP completed with no LVO, but did denote severe focal stenosis of the Right PCA P2/P3 junction with no penumbra identified. Pt. Not a thrombectomy candidate as no LVO identified. Pt. Admitted for further neurological work-up including r/o of ischemic event.     Of note,  liver enzymes remained elevated (ALT 68, Alk Phos 211), and urine consistent with UTI, though cx negative.     Neurological exam:  -Lying in bed  (!) 24 (01/04/23 1119)  Resp: (!) 25 (01/04/23 1119)  BP: (!) 113/53 (01/04/23 1119)  SpO2: 96 % (01/04/23 1119)   Vital Signs (24h Range):  Temp:  [97.6 °F (36.4 °C)-97.8 °F (36.6 °C)] 97.8 °F (36.6 °C)  Pulse:  [23-68] 24  Resp:  [15-38] 25  SpO2:  [90 %-100 %] 96 %  BP: ()/(46-79) 113/53     Weight: 63.5 kg (139 lb 15.9 oz)  Body mass index is 20.67 kg/m².  No intake or output data in the 24 hours ending 01/04/23 1316   Physical Exam  Constitutional:       Appearance: He is ill-appearing and toxic-appearing.      Comments: Very frail and ill appearing male.    HENT:      Mouth/Throat:      Mouth: Mucous membranes are dry.   Eyes:      Conjunctiva/sclera: Conjunctivae normal.   Cardiovascular:      Rate and Rhythm: Bradycardia present.      Heart sounds: Murmur heard.   Pulmonary:      Effort: Pulmonary effort is normal.   Abdominal:      General: Abdomen is flat. There is no distension.      Palpations: Abdomen is soft.      Tenderness: There is no abdominal tenderness.   Musculoskeletal:      Right lower leg: No edema.      Left lower leg: No edema.   Skin:     General: Skin is dry.      Coloration: Skin is pale.   Neurological:      Mental Status: He is lethargic.       Significant Labs: All pertinent labs within the past 24 hours have been reviewed.    Significant Imaging: I have reviewed all pertinent imaging results/findings within the past 24 hours.      Assessment/Plan:      * Symptomatic bradycardia  History of complete heart block  Pacemaker   Atrial fibrillation   Current use of long-term anticoagulation  -patient admitted from [facility] with symptomatic bradycardia  -at admission BP stable, heart rate in 30s with transcutaneous pacing and max dose dopamine gtt in place  -admission labs/diagnostics: CBC with known anemia; stable.  Chemistry with normal potassium at 4.8, magnesium 2.7, BUN 56, 2.0; consistent with SHANEL.  LFTs WNLs.  Digoxin level 1.1.  .  Troponin 0.207.  Lactic  comfortably  -maintains wakefulness, slow to respond but intermittently following simple commands with significant prompting   -Full eye movements   -No clear facial weakness, no dysarthria   -No drift in bilateral arms  -Minimal spontaneous movement in BLE (wiggles toes), withdraws to tickle to bottom of foot briskly LLE brisker than RLE, does not move BLE off plane of bed   -No dysmetria/abnormal movements   -Gait: deferred        Imaging (personally reviewed by me):  8/7/25: DSA with R PCA stenosis but no evidence of vasculitis  8/6/25: routine EEG negative for siezure  8/3/25: MRI brain w/wo: without clear stroke. Small area of diffusion restriction in R posterior insula likely artifact vs subacute ischemic strokes  (no FLAIR or ADC correlate). No noted post-contrast lesions, significant microhemorrhages in the right temporal and right parietal area concerning for cerebral amyloid angiopathy similar to MRI at [facility] 6/2025 8/1/25 CTA head/neck with CT perfusion: no LVO, severe focal stenosis at the right P1/P2 PCA segment junction, no ischemic penumbra identified   8/1/25 Head CT: no hemorrhage, no hypodensties concerning for early signs of ischemic stroke    Impression:  -Acute mental status change with reported increased left sided weakness, though now appears to be close to neurologic baseline with findings of severe focal stenosis at the right P2/P3 junctions of the PCA, clinically exam most consistent with encephalopathy in setting of current UTI, recent cholecystitis. MRI brain also with concern of Right sided (right temporal and right parietal lobes) chronic multiple microhemorrhages concerning for cerebral amyloid angiopathy     8/7/25: Pt with acute neurological decline over the last 2 mo (reported to be independent with all ADLs and intact cognitively prior to June).  denies hx of dementia prior to 2 mo ago (though seems family endorsed forgetfulness and difficulty with medication  acid 2.2.  TSH 3.026.  Chest x-ray with mild CHF.  EKG with idioventricular rhythm and right bundle-branch block  -Cardiology consulted in ED  -PPM interrogation with dead battery  -stop pacing  -hold carvedilol and digoxin  -Cardiology consulted  -TTE  -EKG PRN concerns  -labs in AM and replete electrolytes as indicated  -monitor     Goals of care, counseling/discussion  Advance Care Planning     [facility]  I engaged the family in a conversation about advance care planning and we specifically addressed what the goals of care would be moving forward, in light of the patient's change in clinical status, specifically bradycardia.  We did specifically address the patient's likely prognosis, which is poor.  We explored the patient's values and preferences for future care.  The family endorses that what is most important right now is to focus on quality of life, even if it means sacrificing a little time    Accordingly, we have decided that the best plan to meet the patient's goals includes enrolling in hospice care    I did explain the role for hospice care at this stage of the patient's illness, including its ability to help the patient live with the best quality of life possible.  We will be making a hospice referral.    I spent a total of 10 minutes engaging the patient in this advance care planning discussion.           Acute kidney injury superimposed on chronic kidney disease  CKD, stage III  -in setting of symptomatic bradycardia and hypoperfusion   -history of diastolic heart failure  -mild CHF on chest x-ray with    -avoid IV fluid hydration at current d/t concerns for volume overload and tenuous cardiac condition at current  -avoid nephrotoxins and hypotension as able, renally dose medications  -monitor     Current use of long term anticoagulation        History of complete heart block        History of coronary artery bypass graft        Chronic kidney disease, stage III (moderate)        Normocytic  compliance prior to 2 mo ago to the endocrinology team; family denies dementia to me). Then in June, pt became acutely confused and was admitted to [facility]. There, she was treated as meningitis (unclear what led to the diagnosis, LP was negative for evidence of infection, no VZV, no west nile virus; noted to have elevated protein to 120s but otherwise negative). Was discharged on meningitic coverage (including acyclovir).    Of note, pt has hx of hypothyroid and has significantly elevated TSH. She was noted to potentially not have been taking her medications prior to her hospitalization to [facility] but has been consistently taking for the last 2 months.  does not feel like patient's mental status has improved. Hypothyroid may be contributing (currently being treated) but her reported mental status decline (and continued lack of improvement rendering her bedbound) concerning that there is an and additional ongoing neurological process. 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 DSA negative for vasculitis. LP repeated, CSF results pending. Will plan steroid trial pending no evidence of infection in CSF.     8/8/25: CSF results without evidence of infection.   Endocrinology does not think mental status 2/2 hypothyroid  Will trial high dose steroids (1 g solumedrol) and will re-evaluate in am. Will plan potentially for a 5 day trial.     8/9/25: No change in exam s/p 1 g solumedrol. Will plan to complete 5 day course, will re-evaluate tomorrow    Recommendations:  -holding ASA given c/f possible inflammatory cerebral amyloid angiopathy  -LP completed, no evidence of infection per prelim CSF lab results  -will plan for solumedrol 1g IV x 5 days    -today day 2/5 of solumedrol trial  -endocrinology following for hypothyroid, appreciate recs  -no hx ich, can continue home statin for now   -A1C goal <7  -SBP goal <160  -PT/OT/SLP  -DVT prophylaxis: ok for chem DVT prophy (no hx of ICH)  -Telemetry  anemia  -chronic  -CBC stable   -monitor for signs and symptoms of bleeding  -monitor CBC periodically over hospital course    Coronary artery disease  History of coronary artery bypass graft   History of aortic stenosis   s/p AVR   -chronic   -holding home carvedilol and digoxin as noted above   -EKG PRN concerns   -monitor    Parkinson disease  -chronic  -continue home Sinemet   -fall precautions    Chronic diastolic congestive heart failure  -chronic; history of diastolic heart failure  -in setting of symptomatic bradycardia and hypoperfusion   -mild CHF on chest x-ray with    -avoid IV fluid hydration at current d/t concerns for volume overload in current acute cardiac condition  -hold home carvedilol, digoxin, and Lasix for now  -continue tele monitoring  -strict I&Os and daily weights    S/P AVR (aortic valve replacement)        Hyperlipidemia  -chronic  -comfort care    Essential hypertension  -chronic  -controlled   -off dopamine gtt  -hold home carvedilol, digoxin, Lasix as noted above   -monitor      VTE Risk Mitigation (From admission, onward)         Ordered     IP VTE HIGH RISK PATIENT  Once         01/03/23 1830     Reason for No Pharmacological VTE Prophylaxis  Once        Question:  Reasons:  Answer:  Already adequately anticoagulated on oral Anticoagulants    01/03/23 1830                Discharge Planning   JENNIFER:      Code Status: DNR   Is the patient medically ready for discharge?:     Reason for patient still in hospital (select all that apply): Pending disposition                     Eh Lindsey MD  Department of Hospital Medicine   [facility]   monitoring to look for atrial fibrillation     Deann Willson MD  Vascular Neurology    ---------    Reference Data    PMH:   She  has a past medical history of Anemia, Dementia (CMD), Encephalitis (CMD), Recurrent UTI, and Urinary incontinence.     Social History:   She       Family History:   She family history is not on file.     Home Medications:  Medications Prior to Admission   Medication Sig Dispense Refill    atorvastatin (LIPITOR) 40 MG tablet Take 40 mg by mouth daily.      famotidine (Pepcid) 20 MG tablet Take 20 mg by mouth daily.      Ertugliflozin L-PyroglutamicAc (Steglatro) 15 MG Tab Take 15 mg by mouth daily.      tuberculin (TUBERSOL) 5 UNIT/0.1ML injection Inject 5 Units into the skin 1 time.      acetaminophen (Tylenol) 325 MG tablet Take 325 mg by mouth every 4 hours as needed for Pain (Mild pain).      cyanocobalamin 1000 MCG/ML injection Inject 1,000 mcg into the muscle 1 day a week.      mirtazapine (REMERON) 15 MG tablet Take 15 mg by mouth nightly.      QUEtiapine (SEROquel) 25 MG tablet Take 25 mg by mouth nightly as needed (insomnia).      metFORMIN (GLUCOPHAGE) 500 MG tablet Take 500 mg by mouth in the morning and 500 mg in the evening. Take with meals.      metFORMIN (GLUCOPHAGE) 500 MG tablet Take 500 mg by mouth in the morning and 500 mg in the evening. Take with meals.      losartan (COZAAR) 25 MG tablet Take 25 mg by mouth daily.      levothyroxine 150 MCG tablet Take 150 mcg by mouth daily.      aspirin (Aspirin 81) 81 MG EC tablet Take 81 mg by mouth daily.      Vitamin D, Cholecalciferol, 50 mcg (2,000 units) capsule Take 50 mcg by mouth daily.          Inpatient Medications:  Current Facility-Administered Medications   Medication Dose Route Frequency Provider Last Rate Last Admin    potassium CHLORIDE 20 mEq/100mL IVPB premix  20 mEq Intravenous Once Joe Lemons DO        Followed by    potassium CHLORIDE 20 mEq/100mL IVPB premix  20 mEq Intravenous Once Joe Lemons DO         enoxaparin (LOVENOX) injection 40 mg  40 mg Subcutaneous Daily Faiza Kong CNP   40 mg at 08/08/25 0915    Potassium Standard Replacement Protocol (Levels 3.5 and lower)   Does not apply See Admin Instructions Fabi Diego PA-C        Magnesium Standard Replacement Protocol   Does not apply See Admin Instructions Fabi Diego PA-C        aspirin (ECOTRIN) enteric coated tablet 81 mg  81 mg Oral Daily Joe Lemons DO   81 mg at 08/05/25 0955    rosuvastatin (CRESTOR) tablet 20 mg  20 mg Oral Nightly Jarrett Walton MD   20 mg at 08/08/25 2057    sodium chloride 0.9 % injection 2 mL  2 mL Intracatheter 2 times per day Adwoa Gonzales CNP   2 mL at 08/08/25 2058    famotidine (PEPCID) tablet 20 mg  20 mg Oral Daily Adwoa Gonzales CNP   20 mg at 08/08/25 0916    insulin lispro (ADMELOG,HumaLOG) - Correction Dose   Subcutaneous 4 times per day Adwoa Gonzales CNP   2 Units at 08/09/25 0701    cyanocobalamin injection 1,000 mcg  1,000 mcg Intramuscular 1 day a week Fabi Diego PA-C   1,000 mcg at 08/02/25 1131    levothyroxine (SYNTHROID, LEVOTHROID) tablet 150 mcg  150 mcg Oral Daily Fabi Diego PA-C   150 mcg at 08/08/25 0916    mirtazapine (REMERON) tablet 15 mg  15 mg Oral Nightly Fabi Diego PA-C   15 mg at 08/08/25 2057    [Held by provider] losartan (COZAAR) tablet 25 mg  25 mg Oral Daily Fabi Diego PA-C          Current Facility-Administered Medications   Medication Dose Route Frequency Provider Last Rate Last Admin      Current Facility-Administered Medications   Medication Dose Route Frequency Provider Last Rate Last Admin    acetaminophen (TYLENOL) tablet 650 mg  650 mg Oral Q4H PRN Adwoa Gonzales CNP   650 mg at 08/02/25 2201    Or    acetaminophen (TYLENOL) suppository 650 mg  650 mg Rectal Q4H PRN Adwoa Gonzales CNP        sodium chloride 0.9 % injection 10 mL  10 mL Intravenous PRN Adwoa Gonzales CNP        sodium chloride (NORMAL  SALINE) 0.9 % bolus 500 mL  500 mL Intravenous PRN Adwoa Gonzales CNP        dextrose 50 % injection 25 g  25 g Intravenous PRN Adwoa Gonzales CNP        dextrose 50 % injection 12.5 g  12.5 g Intravenous PRN Adwoa Gonzales, CNP   12.5 g at 08/02/25 1422    glucagon (GLUCAGEN) injection 1 mg  1 mg Intramuscular PRN Adwoa Gonzales CNP        dextrose (GLUTOSE) 40 % gel 15 g  15 g Oral PRN Adwoa Gonzales CNP        dextrose (GLUTOSE) 40 % gel 30 g  30 g Oral PRN Adwoa Gonzales CNP        QUEtiapine (SEROquel) tablet 25 mg  25 mg Oral Nightly PRN Fabi Diego PA-C   25 mg at 08/06/25 2058    LORazepam (ATIVAN) tablet 2 mg  2 mg Oral Q5 Min PRN Jarrett Walton MD   2 mg at 08/03/25 1027        Allergies:  ALLERGIES:  No Known Allergies     Labs:  CMP  Recent Labs     08/07/25  0442 08/07/25  1355 08/08/25  0449 08/09/25  0454   SODIUM 142  --  140 137   POTASSIUM 3.4 4.5 3.8 4.1   CO2 28  --  28 27   ANIONGAP 8  --  9 9   GLUCOSE 152*  --  147* 252*   BUN <5*  --  8 15   CREATININE 0.55  --  0.55 0.50*   CALCIUM 10.8*  --  9.8 10.3*        CBC w/ Diff  Recent Labs     08/07/25  0442 08/08/25  0449 08/09/25  0454   WBC 4.6 4.5 5.4   RBC 3.35* 3.21* 3.45*   HGB 10.5* 9.9* 10.5*   HCT 31.4* 29.9* 32.7*    238 262   MCV 93.7 93.1 94.8   MCH 31.3 30.8 30.4   MCHC 33.4 33.1 32.1   NRBCRE 0 0 0        Review of Systems  12 point review of systems is negative except for as above    Physical Exam  General:  Lying in bed in no acute distress.   Head & Neck:  Normocephalic and atraumatic.      EENT: Normal appearance to eyes and nose.    Heart: Regular rate and rhythm  Lungs:  Normal respiratory rate with non-labored respirations.  Abdomen: Soft, nondistended  Extremities:  No amputations. Warm, nonedematous  Musculoskeletal:  No deformities.   Skin:  Warm and dry.  Psychiatric:  Affect normal and appropriate     IR CAROTID INTERNAL SELECT INTRACRANIAL ANGIOGRAM BILATERAL   Final  Result   Impression:      1.   No evidence of vasculitis      2.   R fetal PCA with moderate R P2/P3 stenosis without flow limitation.       Electronically Signed by: ARMANI AIKEN DO    Signed on: 8/7/2025 11:17 AM    Workstation ID: [id number]      MRI BRAIN W WO CONTRAST   Final Result      1. Two punctate foci of acute infarction along the right temporal cortex.   2. Innumerable foci of susceptibility in the right parietal and temporal   lobes, potentially amyloid angiopathy.   3. Confluent supratentorial white matter disease.      ELIZABETH SCHREIBER MD sent a secure message to JAMES HAGEN using   [employer] at 8/4/2025 8:03 AM.         Electronically Signed by: ELIZABETH SCHREIBER MD    Signed on: 8/4/2025 8:03 AM    Workstation ID: [id number]      XR CHEST AP OR PA   Final Result   No radiographic evidence of acute cardiopulmonary disease.                  Electronically Signed by: AWA PERALTA MD    Signed on: 8/1/2025 9:19 PM    Workstation ID: [id number]      CT CEREBRAL PERFUSION W CONTRAST LEVEL 1   Final Result      1.   Severe focal stenosis at the right P1/P2 PCA segment junction. No   large vessel occlusion identified in the head or neck arteries.   2.   No ischemic penumbra identified. Nonspecific symmetric mild apparent   elevation of Tmax within the posterior cerebrum, possibly artifactual.   Other considerations include posterior reversible encephalopathy syndrome   and postictal change.      KEY FINDINGS WERE COMMUNICATED BY DR. HAY TO DR. LEANA IQBAL AT   8/1/2025 6:21 PM.      Electronically Signed by: LORNA HAY M.D.    Signed on: 8/1/2025 6:22 PM    Workstation ID: [id number]      CTA HEAD AND NECK LEVEL 1   Final Result      1.   Severe focal stenosis at the right P1/P2 PCA segment junction. No   large vessel occlusion identified in the head or neck arteries.   2.   No ischemic penumbra identified. Nonspecific symmetric mild apparent   elevation of Tmax within the  posterior cerebrum, possibly artifactual.   Other considerations include posterior reversible encephalopathy syndrome   and postictal change.      KEY FINDINGS WERE COMMUNICATED BY DR. HAY TO DR. LEANA IQBAL AT   8/1/2025 6:21 PM.      Electronically Signed by: LORNA HAY M.D.    Signed on: 8/1/2025 6:22 PM    Workstation ID: [id number]      CT HEAD LEVEL 1   Final Result      No acute intracranial hemorrhage or acute transcortical infarct.      KEY FINDINGS WERE COMMUNICATED BY DR. HAY TO DR. LEANA IQBAL AT   8/1/2025 5:54 PM.      Electronically Signed by: LORNA HAY M.D.    Signed on: 8/1/2025 5:54 PM    Workstation ID: [id number]           End of Reference Data (see top of note for tailored exam, assessment, and recommendations)